# Patient Record
Sex: FEMALE | Race: WHITE | URBAN - METROPOLITAN AREA
[De-identification: names, ages, dates, MRNs, and addresses within clinical notes are randomized per-mention and may not be internally consistent; named-entity substitution may affect disease eponyms.]

---

## 2023-06-07 ENCOUNTER — HOSPITAL ENCOUNTER (OUTPATIENT)
Age: 58
Discharge: HOME | End: 2023-06-07
Payer: MEDICAID

## 2023-06-07 ENCOUNTER — APPOINTMENT (OUTPATIENT)
Dept: GENERAL RADIOLOGY | Age: 58
End: 2023-06-07
Payer: MEDICAID

## 2023-06-07 VITALS
SYSTOLIC BLOOD PRESSURE: 121 MMHG | RESPIRATION RATE: 14 BRPM | DIASTOLIC BLOOD PRESSURE: 66 MMHG | HEART RATE: 78 BPM | OXYGEN SATURATION: 96 % | TEMPERATURE: 98 F

## 2023-06-07 DIAGNOSIS — Z76.0 ENCOUNTER FOR MEDICATION REFILL: Primary | ICD-10-CM

## 2023-06-07 DIAGNOSIS — R04.2 HEMOPTYSIS: ICD-10-CM

## 2023-06-07 DIAGNOSIS — I10 HYPERTENSION, UNSPECIFIED TYPE: ICD-10-CM

## 2023-06-07 LAB
#MXD IC: 0.7 X10ˆ3/UL (ref 0.1–1)
BUN BLD-MCNC: 12 MG/DL (ref 7–18)
CHLORIDE BLD-SCNC: 98 MMOL/L (ref 98–112)
CO2 BLD-SCNC: 30 MMOL/L (ref 21–32)
CREAT BLD-MCNC: 0.6 MG/DL
DDIMER WHOLE BLOOD: <200 NG/ML DDU (ref ?–400)
GFR SERPLBLD BASED ON 1.73 SQ M-ARVRAT: 104 ML/MIN/1.73M2 (ref 60–?)
GLUCOSE BLD-MCNC: 96 MG/DL (ref 70–99)
HCT VFR BLD AUTO: 41.9 %
HCT VFR BLD CALC: 45 %
HGB BLD-MCNC: 13.6 G/DL
ISTAT IONIZED CALCIUM FOR CHEM 8: 1.18 MMOL/L (ref 1.12–1.32)
LYMPHOCYTES # BLD AUTO: 2.6 X10ˆ3/UL (ref 1–4)
LYMPHOCYTES NFR BLD AUTO: 45.4 %
MCH RBC QN AUTO: 28.5 PG (ref 26–34)
MCHC RBC AUTO-ENTMCNC: 32.5 G/DL (ref 31–37)
MCV RBC AUTO: 87.8 FL (ref 80–100)
MIXED CELL %: 12.4 %
NEUTROPHILS # BLD AUTO: 2.4 X10ˆ3/UL (ref 1.5–7.7)
NEUTROPHILS NFR BLD AUTO: 42.2 %
PLATELET # BLD AUTO: 209 X10ˆ3/UL (ref 150–450)
POTASSIUM BLD-SCNC: 4.3 MMOL/L (ref 3.6–5.1)
RBC # BLD AUTO: 4.77 X10ˆ6/UL
SODIUM BLD-SCNC: 139 MMOL/L (ref 136–145)
WBC # BLD AUTO: 5.7 X10ˆ3/UL (ref 4–11)

## 2023-06-07 PROCEDURE — 99203 OFFICE O/P NEW LOW 30 MIN: CPT

## 2023-06-07 PROCEDURE — 85378 FIBRIN DEGRADE SEMIQUANT: CPT

## 2023-06-07 PROCEDURE — 71046 X-RAY EXAM CHEST 2 VIEWS: CPT

## 2023-06-07 PROCEDURE — 99204 OFFICE O/P NEW MOD 45 MIN: CPT

## 2023-06-07 PROCEDURE — 85025 COMPLETE CBC W/AUTO DIFF WBC: CPT

## 2023-06-07 PROCEDURE — 36415 COLL VENOUS BLD VENIPUNCTURE: CPT

## 2023-06-07 PROCEDURE — 80047 BASIC METABLC PNL IONIZED CA: CPT

## 2023-06-07 RX ORDER — VALSARTAN 80 MG/1
80 TABLET ORAL DAILY
Qty: 30 TABLET | Refills: 0 | Status: SHIPPED | OUTPATIENT
Start: 2023-06-07 | End: 2023-06-29

## 2023-06-07 RX ORDER — NEBIVOLOL 2.5 MG/1
2.5 TABLET ORAL DAILY
Qty: 30 TABLET | Refills: 0 | Status: SHIPPED | OUTPATIENT
Start: 2023-06-07

## 2023-06-07 NOTE — ED INITIAL ASSESSMENT (HPI)
Patient presents with need for BP medications  Patient has 2 days left of her medication  She is from [facility] and has PCP appt on 7/7/23

## 2023-06-28 ENCOUNTER — LAB ENCOUNTER (OUTPATIENT)
Dept: LAB | Age: 58
End: 2023-06-28
Attending: INTERNAL MEDICINE
Payer: MEDICAID

## 2023-06-28 ENCOUNTER — OFFICE VISIT (OUTPATIENT)
Dept: INTERNAL MEDICINE CLINIC | Facility: CLINIC | Age: 58
End: 2023-06-28

## 2023-06-28 ENCOUNTER — EKG ENCOUNTER (OUTPATIENT)
Dept: LAB | Age: 58
End: 2023-06-28
Attending: INTERNAL MEDICINE
Payer: MEDICAID

## 2023-06-28 VITALS
HEIGHT: 62.99 IN | BODY MASS INDEX: 51.58 KG/M2 | DIASTOLIC BLOOD PRESSURE: 76 MMHG | RESPIRATION RATE: 16 BRPM | SYSTOLIC BLOOD PRESSURE: 109 MMHG | WEIGHT: 291.13 LBS | HEART RATE: 66 BPM

## 2023-06-28 DIAGNOSIS — R05.8 OTHER COUGH: ICD-10-CM

## 2023-06-28 DIAGNOSIS — Z00.00 PHYSICAL EXAM, ANNUAL: Primary | ICD-10-CM

## 2023-06-28 DIAGNOSIS — N91.2 AMENORRHEA: ICD-10-CM

## 2023-06-28 DIAGNOSIS — Z12.31 BREAST CANCER SCREENING BY MAMMOGRAM: ICD-10-CM

## 2023-06-28 DIAGNOSIS — D86.9 SARCOID: ICD-10-CM

## 2023-06-28 DIAGNOSIS — Z11.1 SCREENING-PULMONARY TB: ICD-10-CM

## 2023-06-28 DIAGNOSIS — I10 PRIMARY HYPERTENSION: ICD-10-CM

## 2023-06-28 DIAGNOSIS — Z00.00 PHYSICAL EXAM, ANNUAL: ICD-10-CM

## 2023-06-28 LAB
ALBUMIN SERPL-MCNC: 3.8 G/DL (ref 3.4–5)
ALBUMIN/GLOB SERPL: 0.9 {RATIO} (ref 1–2)
ALP LIVER SERPL-CCNC: 84 U/L
ALT SERPL-CCNC: 89 U/L
ANION GAP SERPL CALC-SCNC: 4 MMOL/L (ref 0–18)
AST SERPL-CCNC: 50 U/L (ref 15–37)
BASOPHILS # BLD AUTO: 0.06 X10(3) UL (ref 0–0.2)
BASOPHILS NFR BLD AUTO: 0.9 %
BILIRUB SERPL-MCNC: 0.6 MG/DL (ref 0.1–2)
BILIRUB UR QL: NEGATIVE
BUN BLD-MCNC: 8 MG/DL (ref 7–18)
BUN/CREAT SERPL: 13.8 (ref 10–20)
CALCIUM BLD-MCNC: 9 MG/DL (ref 8.5–10.1)
CHLORIDE SERPL-SCNC: 107 MMOL/L (ref 98–112)
CHOLEST SERPL-MCNC: 208 MG/DL (ref ?–200)
CLARITY UR: CLEAR
CO2 SERPL-SCNC: 30 MMOL/L (ref 21–32)
COLOR UR: YELLOW
CREAT BLD-MCNC: 0.58 MG/DL
DEPRECATED RDW RBC AUTO: 41.6 FL (ref 35.1–46.3)
EOSINOPHIL # BLD AUTO: 0.25 X10(3) UL (ref 0–0.7)
EOSINOPHIL NFR BLD AUTO: 3.9 %
ERYTHROCYTE [DISTWIDTH] IN BLOOD BY AUTOMATED COUNT: 12.9 % (ref 11–15)
ESTRADIOL SERPL-MCNC: 15.3 PG/ML
FASTING PATIENT LIPID ANSWER: YES
FASTING STATUS PATIENT QL REPORTED: YES
FSH SERPL-ACNC: 38.8 MIU/ML
GFR SERPLBLD BASED ON 1.73 SQ M-ARVRAT: 105 ML/MIN/1.73M2 (ref 60–?)
GLOBULIN PLAS-MCNC: 4.3 G/DL (ref 2.8–4.4)
GLUCOSE BLD-MCNC: 95 MG/DL (ref 70–99)
GLUCOSE UR-MCNC: NORMAL MG/DL
HCT VFR BLD AUTO: 41.4 %
HDLC SERPL-MCNC: 37 MG/DL (ref 40–59)
HGB BLD-MCNC: 13.4 G/DL
HGB UR QL STRIP.AUTO: NEGATIVE
IMM GRANULOCYTES # BLD AUTO: 0.01 X10(3) UL (ref 0–1)
IMM GRANULOCYTES NFR BLD: 0.2 %
KETONES UR-MCNC: NEGATIVE MG/DL
LDLC SERPL CALC-MCNC: 136 MG/DL (ref ?–100)
LEUKOCYTE ESTERASE UR QL STRIP.AUTO: 250
LYMPHOCYTES # BLD AUTO: 2.59 X10(3) UL (ref 1–4)
LYMPHOCYTES NFR BLD AUTO: 40.9 %
MCH RBC QN AUTO: 28.6 PG (ref 26–34)
MCHC RBC AUTO-ENTMCNC: 32.4 G/DL (ref 31–37)
MCV RBC AUTO: 88.3 FL
MONOCYTES # BLD AUTO: 0.58 X10(3) UL (ref 0.1–1)
MONOCYTES NFR BLD AUTO: 9.2 %
NEUTROPHILS # BLD AUTO: 2.84 X10 (3) UL (ref 1.5–7.7)
NEUTROPHILS # BLD AUTO: 2.84 X10(3) UL (ref 1.5–7.7)
NEUTROPHILS NFR BLD AUTO: 44.9 %
NITRITE UR QL STRIP.AUTO: NEGATIVE
NONHDLC SERPL-MCNC: 171 MG/DL (ref ?–130)
OSMOLALITY SERPL CALC.SUM OF ELEC: 290 MOSM/KG (ref 275–295)
PH UR: 5 [PH] (ref 5–8)
PLATELET # BLD AUTO: 198 10(3)UL (ref 150–450)
POTASSIUM SERPL-SCNC: 4.3 MMOL/L (ref 3.5–5.1)
PROT SERPL-MCNC: 8.1 G/DL (ref 6.4–8.2)
PROT UR-MCNC: NEGATIVE MG/DL
RBC # BLD AUTO: 4.69 X10(6)UL
SODIUM SERPL-SCNC: 141 MMOL/L (ref 136–145)
SP GR UR STRIP: 1.02 (ref 1–1.03)
TRIGL SERPL-MCNC: 194 MG/DL (ref 30–149)
TSI SER-ACNC: 1.79 MIU/ML (ref 0.36–3.74)
UROBILINOGEN UR STRIP-ACNC: NORMAL
VLDLC SERPL CALC-MCNC: 36 MG/DL (ref 0–30)
WBC # BLD AUTO: 6.3 X10(3) UL (ref 4–11)

## 2023-06-28 PROCEDURE — 82670 ASSAY OF TOTAL ESTRADIOL: CPT

## 2023-06-28 PROCEDURE — 83001 ASSAY OF GONADOTROPIN (FSH): CPT

## 2023-06-28 PROCEDURE — 81001 URINALYSIS AUTO W/SCOPE: CPT

## 2023-06-28 PROCEDURE — 3078F DIAST BP <80 MM HG: CPT | Performed by: INTERNAL MEDICINE

## 2023-06-28 PROCEDURE — 86480 TB TEST CELL IMMUN MEASURE: CPT

## 2023-06-28 PROCEDURE — 36415 COLL VENOUS BLD VENIPUNCTURE: CPT

## 2023-06-28 PROCEDURE — 85025 COMPLETE CBC W/AUTO DIFF WBC: CPT

## 2023-06-28 PROCEDURE — 99386 PREV VISIT NEW AGE 40-64: CPT | Performed by: INTERNAL MEDICINE

## 2023-06-28 PROCEDURE — 3074F SYST BP LT 130 MM HG: CPT | Performed by: INTERNAL MEDICINE

## 2023-06-28 PROCEDURE — 3008F BODY MASS INDEX DOCD: CPT | Performed by: INTERNAL MEDICINE

## 2023-06-28 PROCEDURE — 93005 ELECTROCARDIOGRAM TRACING: CPT

## 2023-06-28 PROCEDURE — 84443 ASSAY THYROID STIM HORMONE: CPT

## 2023-06-28 PROCEDURE — 80053 COMPREHEN METABOLIC PANEL: CPT

## 2023-06-28 PROCEDURE — 93010 ELECTROCARDIOGRAM REPORT: CPT | Performed by: STUDENT IN AN ORGANIZED HEALTH CARE EDUCATION/TRAINING PROGRAM

## 2023-06-28 PROCEDURE — 80061 LIPID PANEL: CPT

## 2023-06-29 LAB
ATRIAL RATE: 61 BPM
P AXIS: 6 DEGREES
P-R INTERVAL: 176 MS
Q-T INTERVAL: 400 MS
QRS DURATION: 96 MS
QTC CALCULATION (BEZET): 402 MS
R AXIS: -19 DEGREES
T AXIS: 20 DEGREES
VENTRICULAR RATE: 61 BPM

## 2023-06-29 RX ORDER — OMEPRAZOLE 40 MG/1
40 CAPSULE, DELAYED RELEASE ORAL DAILY
Qty: 90 CAPSULE | Refills: 1 | Status: SHIPPED | OUTPATIENT
Start: 2023-06-29

## 2023-06-29 RX ORDER — VALSARTAN 80 MG/1
80 TABLET ORAL DAILY
Qty: 90 TABLET | Refills: 3 | Status: SHIPPED | OUTPATIENT
Start: 2023-06-29

## 2023-06-29 RX ORDER — METOPROLOL SUCCINATE 25 MG/1
25 TABLET, EXTENDED RELEASE ORAL DAILY
Qty: 90 TABLET | Refills: 1 | Status: SHIPPED | OUTPATIENT
Start: 2023-06-29

## 2023-06-29 RX ORDER — METOPROLOL SUCCINATE 25 MG/1
25 TABLET, EXTENDED RELEASE ORAL DAILY
Qty: 90 TABLET | Refills: 1 | Status: SHIPPED | OUTPATIENT
Start: 2023-06-29 | End: 2023-06-29

## 2023-06-30 LAB
M TB IFN-G CD4+ T-CELLS BLD-ACNC: 0.03 IU/ML
M TB TUBERC IFN-G BLD QL: NEGATIVE
M TB TUBERC IGNF/MITOGEN IGNF CONTROL: >10 IU/ML
QFT TB1 AG MINUS NIL: 0.01 IU/ML
QFT TB2 AG MINUS NIL: 0 IU/ML

## 2023-07-02 PROBLEM — E66.01 CLASS 3 SEVERE OBESITY DUE TO EXCESS CALORIES WITH BODY MASS INDEX (BMI) OF 50.0 TO 59.9 IN ADULT (HCC): Status: ACTIVE | Noted: 2023-07-02

## 2023-07-02 PROBLEM — I10 PRIMARY HYPERTENSION: Status: ACTIVE | Noted: 2023-07-02

## 2023-07-02 PROBLEM — D86.9 SARCOID: Status: ACTIVE | Noted: 2023-07-02

## 2023-07-02 PROBLEM — E66.813 CLASS 3 SEVERE OBESITY DUE TO EXCESS CALORIES WITH BODY MASS INDEX (BMI) OF 50.0 TO 59.9 IN ADULT (HCC): Status: ACTIVE | Noted: 2023-07-02

## 2023-07-02 PROBLEM — E66.813 CLASS 3 SEVERE OBESITY DUE TO EXCESS CALORIES WITH BODY MASS INDEX (BMI) OF 50.0 TO 59.9 IN ADULT: Status: ACTIVE | Noted: 2023-07-02

## 2023-07-12 ENCOUNTER — TELEPHONE (OUTPATIENT)
Dept: ADMINISTRATIVE | Age: 58
End: 2023-07-12

## 2023-07-12 NOTE — TELEPHONE ENCOUNTER
Spoke to patient, verified name and . Was unable to speak to patient, due to us not speaking each other's languages.  are you able to give patient a call and follow up with her about her order. . please and thank you.

## 2023-07-12 NOTE — TELEPHONE ENCOUNTER
Hello,    Prior authorization for CT CHEST+ABDOMEN+PELVIS(ALL CNTRST ONLY)(CPT=71260/06766) has been partially authorized by payer/Fiiiling after first level clinical review. CPT=71260 - APPROVED  Auth # / Ref #  [id number] / [id number]  Date(s) of Service  07/11/2023 - 08/25/2023  Description  Your case has been approved by the benefit manager. GKC=26633 - DENIED  Auth # / Ref #  - / [id number]  Description  Your request has been denied by the benefit manager. Benefit Manager Notes  Not Medically Necessary    Denial rationale: \"The study must be needed for one of the following.     -To confirm if you have sarcoid (a rare disease caused by inflammation) or not. -Worsening symptoms.   -New symptoms.   -Your doctor is thinking about changing your treatment. \"    Case remains [id number] eligible until end of day 07/19/2023. Case remains first level appeal eligible for the next 60 calendar days, appeal information may be found within denial letter. Jyiz-[id number] offer letter and denial letter PDF's may be found within the Media tab. *PLEASE NOTE: patient is scheduled for both imaging studies 07/14/2023. Please advise,  Thank you!

## 2023-07-12 NOTE — TELEPHONE ENCOUNTER
Spoke to every core physician support [facility] 1 [phone] Case #[id number].   Spoke to Emma Penny V who approved CT scan of the pelvis and abdomen valid from 7/12/2023 until August 26, 2023 approval number [id number]

## 2023-07-14 ENCOUNTER — TELEPHONE (OUTPATIENT)
Dept: INTERNAL MEDICINE CLINIC | Facility: CLINIC | Age: 58
End: 2023-07-14

## 2023-07-14 ENCOUNTER — HOSPITAL ENCOUNTER (OUTPATIENT)
Dept: CT IMAGING | Facility: HOSPITAL | Age: 58
Discharge: HOME | End: 2023-07-14
Attending: INTERNAL MEDICINE
Payer: MEDICAID

## 2023-07-14 DIAGNOSIS — D86.9 SARCOID: ICD-10-CM

## 2023-07-14 PROCEDURE — 74177 CT ABD & PELVIS W/CONTRAST: CPT | Performed by: INTERNAL MEDICINE

## 2023-07-14 PROCEDURE — 71260 CT THORAX DX C+: CPT | Performed by: INTERNAL MEDICINE

## 2023-07-14 NOTE — TELEPHONE ENCOUNTER
Patient is requesting a urinalysis to be ordered by Dr Josi Jerry.  Patient thought Dr Josi Jerry had already placed order since they discussed this earlier in the week    Patient is asking if Dr Josi Jerry can send another referral for pulmonologist - the current referral does not have an appointment until October and patient thinks she should be seen sooner

## 2023-07-17 ENCOUNTER — TELEPHONE (OUTPATIENT)
Dept: INTERNAL MEDICINE CLINIC | Facility: CLINIC | Age: 58
End: 2023-07-17

## 2023-07-17 DIAGNOSIS — R74.8 ELEVATED LIVER ENZYMES: Primary | ICD-10-CM

## 2023-07-17 NOTE — TELEPHONE ENCOUNTER
Spoke to patient, reviewed CT scan findings, no evidence of sarcoidosis, advised that I did place order to see another pulmonologist but I do not see urgency. She will need follow-up CT scan of the lungs in 6 months. Mildly nonspecific enlarged lymph nodes in the abdomen, with signs of hepatic steatosis, advised patient to repeat liver enzymes and we will check additional blood work rule out hepatitis B and C and other diseases. Order for urine analysis placed.   Patient will follow-up in August

## 2023-07-21 ENCOUNTER — HOSPITAL ENCOUNTER (OUTPATIENT)
Age: 58
Discharge: HOME | End: 2023-07-21
Payer: MEDICAID

## 2023-07-21 ENCOUNTER — APPOINTMENT (OUTPATIENT)
Dept: GENERAL RADIOLOGY | Age: 58
End: 2023-07-21
Payer: MEDICAID

## 2023-07-21 VITALS
HEART RATE: 82 BPM | TEMPERATURE: 99 F | SYSTOLIC BLOOD PRESSURE: 117 MMHG | DIASTOLIC BLOOD PRESSURE: 76 MMHG | OXYGEN SATURATION: 96 % | RESPIRATION RATE: 18 BRPM

## 2023-07-21 DIAGNOSIS — M72.2 PLANTAR FASCIITIS: ICD-10-CM

## 2023-07-21 DIAGNOSIS — M77.32 CALCANEAL SPUR OF FOOT, LEFT: Primary | ICD-10-CM

## 2023-07-21 PROCEDURE — 99213 OFFICE O/P EST LOW 20 MIN: CPT

## 2023-07-21 PROCEDURE — 73650 X-RAY EXAM OF HEEL: CPT

## 2023-07-21 PROCEDURE — 99214 OFFICE O/P EST MOD 30 MIN: CPT

## 2023-07-21 NOTE — ED INITIAL ASSESSMENT (HPI)
Pt presents with left heel pain x 3 months. Pt reports stepping on a piece of glass. No FB is externally visible.

## 2023-07-21 NOTE — DISCHARGE INSTRUCTIONS
There is no fracture on your x-ray. There is a heel spur on your x-ray. You also likely have some irritation to the plantar fascia on the heel. Rest, ice and elevate. Take Tylenol and Motrin for pain as needed. If you develop any numbness, tingling, acutely worsening pain, swelling or any other concerning complaints you should go to the emergency department. If you have persistent pain for more than the next week make an appointment to see the podiatry specialist.  Otherwise follow-up with your primary care doctor.

## 2023-07-26 ENCOUNTER — HOSPITAL ENCOUNTER (OUTPATIENT)
Dept: MAMMOGRAPHY | Age: 58
End: 2023-07-26
Attending: INTERNAL MEDICINE
Payer: MEDICAID

## 2023-07-26 ENCOUNTER — HOSPITAL ENCOUNTER (OUTPATIENT)
Dept: MAMMOGRAPHY | Age: 58
Discharge: HOME | End: 2023-07-26
Attending: INTERNAL MEDICINE
Payer: MEDICAID

## 2023-07-26 ENCOUNTER — APPOINTMENT (OUTPATIENT)
Dept: MAMMOGRAPHY | Age: 58
End: 2023-07-26
Attending: INTERNAL MEDICINE
Payer: MEDICAID

## 2023-07-26 ENCOUNTER — LAB ENCOUNTER (OUTPATIENT)
Dept: LAB | Age: 58
End: 2023-07-26
Attending: INTERNAL MEDICINE
Payer: MEDICAID

## 2023-07-26 DIAGNOSIS — R35.0 FREQUENT URINATION: ICD-10-CM

## 2023-07-26 DIAGNOSIS — R74.8 ELEVATED LIVER ENZYMES: ICD-10-CM

## 2023-07-26 DIAGNOSIS — Z12.31 BREAST CANCER SCREENING BY MAMMOGRAM: ICD-10-CM

## 2023-07-26 DIAGNOSIS — Z86.2 HISTORY OF SARCOIDOSIS: ICD-10-CM

## 2023-07-26 LAB
A1AT SERPL-MCNC: 130 MG/DL (ref 90–200)
A1AT SERPL-MCNC: 132 MG/DL (ref 90–200)
ALBUMIN SERPL-MCNC: 3.7 G/DL (ref 3.4–5)
ALP LIVER SERPL-CCNC: 86 U/L
ALT SERPL-CCNC: 98 U/L
AST SERPL-CCNC: 61 U/L (ref 15–37)
BILIRUB DIRECT SERPL-MCNC: 0.2 MG/DL (ref 0–0.2)
BILIRUB SERPL-MCNC: 0.6 MG/DL (ref 0.1–2)
BILIRUB UR QL STRIP.AUTO: NEGATIVE
CERULOPLASMIN SERPL-MCNC: 26.9 MG/DL (ref 20–60)
COLOR UR AUTO: YELLOW
DEPRECATED HBV CORE AB SER IA-ACNC: 77.8 NG/ML
GLUCOSE UR STRIP.AUTO-MCNC: NEGATIVE MG/DL
HAV AB SER QL IA: REACTIVE
HBV CORE AB SERPL QL IA: NONREACTIVE
HBV SURFACE AB SER QL: NONREACTIVE
HBV SURFACE AB SERPL IA-ACNC: <3.1 MIU/ML
HBV SURFACE AG SERPL QL IA: NONREACTIVE
HCV AB SERPL QL IA: NONREACTIVE
KETONES UR STRIP.AUTO-MCNC: NEGATIVE MG/DL
NITRITE UR QL STRIP.AUTO: NEGATIVE
PH UR STRIP.AUTO: 5 [PH] (ref 5–8)
PROT SERPL-MCNC: 7.8 G/DL (ref 6.4–8.2)
PROT UR STRIP.AUTO-MCNC: NEGATIVE MG/DL
RBC UR QL AUTO: NEGATIVE
SP GR UR STRIP.AUTO: 1.03 (ref 1–1.03)
TRANSFERRIN SERPL-MCNC: 253 MG/DL (ref 200–360)
UROBILINOGEN UR STRIP.AUTO-MCNC: <2 MG/DL

## 2023-07-26 PROCEDURE — 82390 ASSAY OF CERULOPLASMIN: CPT

## 2023-07-26 PROCEDURE — 77063 BREAST TOMOSYNTHESIS BI: CPT | Performed by: INTERNAL MEDICINE

## 2023-07-26 PROCEDURE — 86334 IMMUNOFIX E-PHORESIS SERUM: CPT

## 2023-07-26 PROCEDURE — 87340 HEPATITIS B SURFACE AG IA: CPT

## 2023-07-26 PROCEDURE — 87086 URINE CULTURE/COLONY COUNT: CPT

## 2023-07-26 PROCEDURE — 84165 PROTEIN E-PHORESIS SERUM: CPT

## 2023-07-26 PROCEDURE — 77067 SCR MAMMO BI INCL CAD: CPT | Performed by: INTERNAL MEDICINE

## 2023-07-26 PROCEDURE — 86803 HEPATITIS C AB TEST: CPT

## 2023-07-26 PROCEDURE — 83516 IMMUNOASSAY NONANTIBODY: CPT

## 2023-07-26 PROCEDURE — 86709 HEPATITIS A IGM ANTIBODY: CPT

## 2023-07-26 PROCEDURE — 81001 URINALYSIS AUTO W/SCOPE: CPT

## 2023-07-26 PROCEDURE — 86708 HEPATITIS A ANTIBODY: CPT

## 2023-07-26 PROCEDURE — 86706 HEP B SURFACE ANTIBODY: CPT

## 2023-07-26 PROCEDURE — 36415 COLL VENOUS BLD VENIPUNCTURE: CPT

## 2023-07-26 PROCEDURE — 86704 HEP B CORE ANTIBODY TOTAL: CPT

## 2023-07-26 PROCEDURE — 84466 ASSAY OF TRANSFERRIN: CPT

## 2023-07-26 PROCEDURE — 83521 IG LIGHT CHAINS FREE EACH: CPT

## 2023-07-26 PROCEDURE — 80076 HEPATIC FUNCTION PANEL: CPT

## 2023-07-26 PROCEDURE — 80503 PATH CLIN CONSLTJ SF 5-20: CPT

## 2023-07-26 PROCEDURE — 82728 ASSAY OF FERRITIN: CPT

## 2023-07-26 PROCEDURE — 82103 ALPHA-1-ANTITRYPSIN TOTAL: CPT

## 2023-07-27 ENCOUNTER — OFFICE VISIT (OUTPATIENT)
Dept: PODIATRY CLINIC | Facility: CLINIC | Age: 58
End: 2023-07-27

## 2023-07-27 DIAGNOSIS — M77.32 CALCANEAL SPUR OF LEFT FOOT: ICD-10-CM

## 2023-07-27 DIAGNOSIS — M79.672 INFLAMMATORY HEEL PAIN, LEFT: ICD-10-CM

## 2023-07-27 DIAGNOSIS — Q66.212 METATARSUS PRIMUS VARUS OF LEFT FOOT: ICD-10-CM

## 2023-07-27 DIAGNOSIS — M20.12 HALLUX VALGUS OF LEFT FOOT: ICD-10-CM

## 2023-07-27 DIAGNOSIS — M72.2 PLANTAR FASCIITIS OF LEFT FOOT: Primary | ICD-10-CM

## 2023-07-27 LAB
ACTIN SMOOTH MUSCLE AB: 8 UNITS
HAV IGM SER QL: NONREACTIVE

## 2023-07-27 PROCEDURE — 99203 OFFICE O/P NEW LOW 30 MIN: CPT | Performed by: PODIATRIST

## 2023-07-27 PROCEDURE — 20550 NJX 1 TENDON SHEATH/LIGAMENT: CPT | Performed by: PODIATRIST

## 2023-07-27 RX ORDER — TRIAMCINOLONE ACETONIDE 40 MG/ML
40 INJECTION, SUSPENSION INTRA-ARTICULAR; INTRAMUSCULAR ONCE
Status: COMPLETED | OUTPATIENT
Start: 2023-07-27 | End: 2023-07-27

## 2023-07-27 RX ADMIN — TRIAMCINOLONE ACETONIDE 40 MG: 40 INJECTION, SUSPENSION INTRA-ARTICULAR; INTRAMUSCULAR at 14:27:00

## 2023-07-27 NOTE — PROGRESS NOTES
Per Dr. Hakan Urban to draw up 1ml of Kenalog 40 and 1ml of 0.5% Marcaine for injection to left heel. no

## 2023-07-27 NOTE — PROGRESS NOTES
David Bautista is a 62year old female. Patient presents with:  Consult: Left foot heel pain since April pain progressed and now it is hard to ambulate. Pain 10/10        HPI:   This pleasant 19-year-old female patient who speaks only [place] presents to the clinic today the  line was used in order to communicate with her. She is complaining of left heel pain that she has had ever since she came to the [place] pain is very bad getting worst always in the morning or after getting up from a prolonged nonweightbearing position but also it hurts periodically all throughout the day she says it is a deep inside pain. At today's visit reviewed nurse's history as taken above, allergies medications and medical history as documented below. All changes duly noted  Allergies: Patient has no known allergies. Current Outpatient Medications   Medication Sig Dispense Refill    metoprolol succinate ER 25 MG Oral Tablet 24 Hr Take 1 tablet (25 mg total) by mouth daily. 90 tablet 1    valsartan 80 MG Oral Tab Take 1 tablet (80 mg total) by mouth daily. In the evening 90 tablet 3    Omeprazole 40 MG Oral Capsule Delayed Release Take 1 capsule (40 mg total) by mouth daily. 90 capsule 1    nebivolol 2.5 MG Oral Tab Take 1 tablet (2.5 mg total) by mouth daily. 30 tablet 0      Past Medical History:   Diagnosis Date    Hypertension       History reviewed. No pertinent surgical history. History reviewed. No pertinent family history.    Social History    Socioeconomic History      Marital status:     Tobacco Use      Smoking status: Never      Smokeless tobacco: Never    Substance and Sexual Activity      Alcohol use: Yes      Drug use: Never          REVIEW OF SYSTEMS:   Today reviewed systens as documented below  GENERAL HEALTH: feels well otherwise  SKIN: Refer to exam below  RESPIRATORY: denies shortness of breath with exertion  CARDIOVASCULAR: denies chest pain on exertion  GI: denies abdominal pain and denies heartburn  NEURO: denies headaches    EXAM:   There were no vitals taken for this visit. GENERAL: well developed, well nourished, in no apparent distress  EXTREMITIES:   1. Integument: The skin on her feet is warm and dry. He has an enlarged medial eminence of the first metatarsal head bilateral she says this is also painful from time to time. 2. Vascular: Patient has palpable pulses on the left both dorsalis pedis and posterior tibial that are within normal limits there is good cap return the pedal digits   3. Neurologic: Patient has intact sensorium there are no deficits noted   4. Musculoskeletal: The patient has severe pain on palpation of the plantar fascia of her left heel. Hallux is laterally deviated in a track bound position on the left foot  X-rays were taken AP and lateral showing an infracalcaneal spur on the left heel and also a significant increase in the first intermetatarsal angle with lateral deviation of the hallux and sesamoid complex    ASSESSMENT AND PLAN:   Diagnoses and all orders for this visit:    Plantar fasciitis of left foot  -     triamcinolone acetonide (Kenalog-40) 40 MG/ML injection 40 mg  -     OP REFERRAL TO [facility]    Inflammatory heel pain, left  -     triamcinolone acetonide (Kenalog-40) 40 MG/ML injection 40 mg  -     OP REFERRAL TO [facility]    Calcaneal spur of left foot  -     triamcinolone acetonide (Kenalog-40) 40 MG/ML injection 40 mg  -     OP REFERRAL TO [facility]    Hallux valgus of left foot    Metatarsus primus varus of left foot    Other orders  -     [facility] AMB POD XR - LT FOOT 2 VIEWS(AP, LATERAL)WT BEARING        Plan: Today the biggest problem is her heel I educated her about the bunion and about the heel and at today's visit recommended a cortisone injection to start. Today discussed the nature and extent of a cortisone injection as well as the possible complications and risks.   Patient was in agreement to receive the injection. The patient was consented for a cortisone injection and after timeout was taken the injection consisting of 40 mg Kenalog and 1.0 cc of 0.5% Marcaine plain was injected into the symptomatic plantar fascia of the left heel using aseptic technique and appropriate approach. A Band-Aid was applied to the injection site. Has been here for some time with prescribed physical therapy for 4 weeks 8 visits total.  See the patient again in 5 weeks and at that time we can discuss bunion correction which would be a Lapidus procedure. The patient indicates understanding of these issues and agrees to the plan.     Simba Garcia DPM

## 2023-07-28 LAB
ALBUMIN SERPL ELPH-MCNC: 4.35 G/DL (ref 3.75–5.21)
ALBUMIN/GLOB SERPL: 1.34 {RATIO} (ref 1–2)
ALPHA1 GLOB SERPL ELPH-MCNC: 0.27 G/DL (ref 0.19–0.46)
ALPHA2 GLOB SERPL ELPH-MCNC: 0.81 G/DL (ref 0.48–1.05)
B-GLOBULIN SERPL ELPH-MCNC: 0.78 G/DL (ref 0.68–1.23)
GAMMA GLOB SERPL ELPH-MCNC: 1.4 G/DL (ref 0.62–1.7)
KAPPA LC FREE SER-MCNC: 2.79 MG/DL (ref 0.33–1.94)
KAPPA LC FREE/LAMBDA FREE SER NEPH: 1.37 {RATIO} (ref 0.26–1.65)
LAMBDA LC FREE SERPL-MCNC: 2.03 MG/DL (ref 0.57–2.63)
PROT SERPL-MCNC: 7.6 G/DL (ref 6.4–8.2)

## 2023-08-08 ENCOUNTER — OFFICE VISIT (OUTPATIENT)
Facility: LOCATION | Age: 58
End: 2023-08-08
Attending: PODIATRIST
Payer: MEDICAID

## 2023-08-08 DIAGNOSIS — M72.2 PLANTAR FASCIITIS OF LEFT FOOT: Primary | ICD-10-CM

## 2023-08-08 DIAGNOSIS — M79.672 INFLAMMATORY HEEL PAIN, LEFT: ICD-10-CM

## 2023-08-08 DIAGNOSIS — M77.32 CALCANEAL SPUR OF LEFT FOOT: ICD-10-CM

## 2023-08-08 PROCEDURE — 97161 PT EVAL LOW COMPLEX 20 MIN: CPT

## 2023-08-08 PROCEDURE — 97110 THERAPEUTIC EXERCISES: CPT

## 2023-08-11 ENCOUNTER — HOSPITAL ENCOUNTER (OUTPATIENT)
Dept: GENERAL RADIOLOGY | Age: 58
Discharge: HOME | End: 2023-08-11
Attending: INTERNAL MEDICINE
Payer: MEDICAID

## 2023-08-11 ENCOUNTER — OFFICE VISIT (OUTPATIENT)
Facility: LOCATION | Age: 58
End: 2023-08-11
Attending: PODIATRIST
Payer: MEDICAID

## 2023-08-11 ENCOUNTER — OFFICE VISIT (OUTPATIENT)
Dept: INTERNAL MEDICINE CLINIC | Facility: CLINIC | Age: 58
End: 2023-08-11

## 2023-08-11 VITALS
BODY MASS INDEX: 53.92 KG/M2 | DIASTOLIC BLOOD PRESSURE: 88 MMHG | SYSTOLIC BLOOD PRESSURE: 129 MMHG | WEIGHT: 293 LBS | HEIGHT: 62 IN | HEART RATE: 78 BPM | RESPIRATION RATE: 18 BRPM

## 2023-08-11 DIAGNOSIS — R74.8 ELEVATED LIVER ENZYMES: ICD-10-CM

## 2023-08-11 DIAGNOSIS — M79.18 CERVICAL MYOFASCIAL PAIN SYNDROME: ICD-10-CM

## 2023-08-11 DIAGNOSIS — H43.393 VITREOUS FLOATERS OF BOTH EYES: ICD-10-CM

## 2023-08-11 DIAGNOSIS — M54.2 NECK PAIN: Primary | ICD-10-CM

## 2023-08-11 DIAGNOSIS — E66.01 CLASS 3 SEVERE OBESITY DUE TO EXCESS CALORIES WITHOUT SERIOUS COMORBIDITY WITH BODY MASS INDEX (BMI) OF 50.0 TO 59.9 IN ADULT (HCC): ICD-10-CM

## 2023-08-11 DIAGNOSIS — M54.2 NECK PAIN: ICD-10-CM

## 2023-08-11 DIAGNOSIS — Z12.11 COLON CANCER SCREENING: ICD-10-CM

## 2023-08-11 DIAGNOSIS — I87.2 EDEMA OF BOTH LOWER EXTREMITIES DUE TO PERIPHERAL VENOUS INSUFFICIENCY: ICD-10-CM

## 2023-08-11 DIAGNOSIS — R06.83 SNORING: ICD-10-CM

## 2023-08-11 DIAGNOSIS — M79.18 MYOFASCIAL PAIN SYNDROME OF THORACIC SPINE: ICD-10-CM

## 2023-08-11 PROCEDURE — 3074F SYST BP LT 130 MM HG: CPT | Performed by: INTERNAL MEDICINE

## 2023-08-11 PROCEDURE — 97140 MANUAL THERAPY 1/> REGIONS: CPT

## 2023-08-11 PROCEDURE — 97110 THERAPEUTIC EXERCISES: CPT

## 2023-08-11 PROCEDURE — 72052 X-RAY EXAM NECK SPINE 6/>VWS: CPT | Performed by: INTERNAL MEDICINE

## 2023-08-11 PROCEDURE — 99214 OFFICE O/P EST MOD 30 MIN: CPT | Performed by: INTERNAL MEDICINE

## 2023-08-11 PROCEDURE — 3008F BODY MASS INDEX DOCD: CPT | Performed by: INTERNAL MEDICINE

## 2023-08-11 PROCEDURE — 3079F DIAST BP 80-89 MM HG: CPT | Performed by: INTERNAL MEDICINE

## 2023-08-11 NOTE — PROGRESS NOTES
Diagnosis:   Plantar fasciitis of left foot (M72.2)  Inflammatory heel pain, left (U17.535)  Calcaneal spur of left foot (M77.32)         Referring Provider: Gabriela  Date of Evaluation:    8/8/23    Precautions:  None Next MD visit:   none scheduled  Date of Surgery: n/a   Insurance Primary/Secondary: GRICELDA Martinez / N/A     # Auth Visits: 8            Subjective: calf and heel feel much better than last visit     Pain: 3/10      Objective:   See flowsheet      Assessment:   Significant myofascial restrictions noted throughout calf musculature (L>R)  Improvement with soft tissue treatment and stretching       Goals:   Pt will demonstrate improved DF AROM by 5-8 degrees to promote proper foot clearance during gait and greater ease descending stairs without compensation  Pt will have increased ankle strength to 5/5 throughout for improved ankle control with gait and stair negotiation  Pt will report less pain and restriction noted with first steps in the morning when getting out of bed or from prolonged nonweightbearing position  Pt will demonstrate improved ambulatory pattern with good push off and less avoidance of terminal stance   Pt will report <4/10 pain with work and home activities   Pt will be independent and compliant with comprehensive HEP to maintain progress achieved in PT     Plan: improve soft tissue flexibility (calf, PF, HS)  Date: 8/11/2023  TX#: 2/8 Date:                 TX#: 3/ Date:                 TX#: 4/ Date:                 TX#: 5/ Date: Tx#: 6/   Manual Therapy       STM/IASTM (B) calf musculature        TherEx       Long sitting calf stretch (knee straight and bent) 2 min each        PT assisted HS stretch 1 min x 2 each       Balance board PF/DF x 2 min        PF ball rolls x 2 min       HEP: Seated PF self release, Seated PF stretch    Charges: Manual 2;  TherEx 1       Total Timed Treatment: 40 min  Total Treatment Time: 40 min

## 2023-08-13 PROBLEM — K76.0 HEPATIC STEATOSIS: Status: ACTIVE | Noted: 2023-08-13

## 2023-08-15 ENCOUNTER — OFFICE VISIT (OUTPATIENT)
Dept: OTOLARYNGOLOGY | Facility: CLINIC | Age: 58
End: 2023-08-15

## 2023-08-15 ENCOUNTER — TELEPHONE (OUTPATIENT)
Dept: OTOLARYNGOLOGY | Facility: CLINIC | Age: 58
End: 2023-08-15

## 2023-08-15 ENCOUNTER — OFFICE VISIT (OUTPATIENT)
Facility: LOCATION | Age: 58
End: 2023-08-15
Attending: PODIATRIST
Payer: MEDICAID

## 2023-08-15 VITALS — WEIGHT: 293 LBS | BODY MASS INDEX: 53.92 KG/M2 | HEIGHT: 62 IN | TEMPERATURE: 97 F

## 2023-08-15 DIAGNOSIS — J34.3 HYPERTROPHY OF BOTH INFERIOR NASAL TURBINATES: ICD-10-CM

## 2023-08-15 DIAGNOSIS — R06.83 SNORING: Primary | ICD-10-CM

## 2023-08-15 DIAGNOSIS — J34.2 NASAL SEPTAL DEVIATION: ICD-10-CM

## 2023-08-15 DIAGNOSIS — J35.1 TONSILLAR HYPERTROPHY: ICD-10-CM

## 2023-08-15 DIAGNOSIS — H60.8X3 CHRONIC ECZEMATOUS OTITIS EXTERNA OF BOTH EARS: ICD-10-CM

## 2023-08-15 PROCEDURE — 97140 MANUAL THERAPY 1/> REGIONS: CPT

## 2023-08-15 PROCEDURE — 97110 THERAPEUTIC EXERCISES: CPT

## 2023-08-15 PROCEDURE — 31575 DIAGNOSTIC LARYNGOSCOPY: CPT | Performed by: STUDENT IN AN ORGANIZED HEALTH CARE EDUCATION/TRAINING PROGRAM

## 2023-08-15 PROCEDURE — 3008F BODY MASS INDEX DOCD: CPT | Performed by: STUDENT IN AN ORGANIZED HEALTH CARE EDUCATION/TRAINING PROGRAM

## 2023-08-15 PROCEDURE — 99244 OFF/OP CNSLTJ NEW/EST MOD 40: CPT | Performed by: STUDENT IN AN ORGANIZED HEALTH CARE EDUCATION/TRAINING PROGRAM

## 2023-08-15 RX ORDER — MOMETASONE FUROATE 1 MG/G
1 OINTMENT TOPICAL DAILY
Qty: 1 EACH | Refills: 0 | Status: SHIPPED | OUTPATIENT
Start: 2023-08-15

## 2023-08-15 RX ORDER — AZELASTINE 1 MG/ML
2 SPRAY, METERED NASAL 2 TIMES DAILY
Qty: 30 ML | Refills: 0 | Status: SHIPPED | OUTPATIENT
Start: 2023-08-15

## 2023-08-15 RX ORDER — FLUOCINOLONE ACETONIDE 0.11 MG/ML
3 OIL AURICULAR (OTIC) 2 TIMES DAILY
Qty: 20 ML | Refills: 0 | Status: SHIPPED | OUTPATIENT
Start: 2023-08-15 | End: 2023-08-22

## 2023-08-15 NOTE — PROGRESS NOTES
Diagnosis:   Plantar fasciitis of left foot (M72.2)  Inflammatory heel pain, left (C66.006)  Calcaneal spur of left foot (M77.32)         Referring Provider: Gabriela  Date of Evaluation:    8/8/23    Precautions:  None Next MD visit:   none scheduled  Date of Surgery: n/a   Insurance Primary/Secondary: GRICELDA Winters / N/A     # Auth Visits: 8            Subjective: significant improvement since last visit     Pain: 4/10      Objective:   See flowsheet      Assessment:   Significant myofascial restrictions noted throughout calf musculature (L>R) - improved with IASTM/STM  Standing and walking better without heel pain         Goals:   Pt will demonstrate improved DF AROM by 5-8 degrees to promote proper foot clearance during gait and greater ease descending stairs without compensation  Pt will have increased ankle strength to 5/5 throughout for improved ankle control with gait and stair negotiation  Pt will report less pain and restriction noted with first steps in the morning when getting out of bed or from prolonged nonweightbearing position  Pt will demonstrate improved ambulatory pattern with good push off and less avoidance of terminal stance   Pt will report <4/10 pain with work and home activities   Pt will be independent and compliant with comprehensive HEP to maintain progress achieved in PT     Plan: improve soft tissue flexibility (calf, PF, HS)  Date: 8/11/2023  TX#: 2/8 Date: 8/15/23               TX#: 3/8 Date:                 TX#: 4/ Date:                 TX#: 5/ Date:    Tx#: 6/   Manual Therapy Manual Therapy      STM/IASTM (B) calf musculature  STM/IASTM (B) calf musculature       TherEx TherEx      Long sitting calf stretch (knee straight and bent) 2 min each  Long sitting calf stretch (knee straight and bent) 2 min each       PT assisted HS stretch 1 min x 2 each PF ball rolls x 3  min      Balance board PF/DF x 2 min  PF stretch x 10 (10 seconds)      PF ball rolls x 2 min Balance board PF/DF x 3 min       PT assisted HS stretch 1 min (B)       HEP: Seated PF self release, Seated PF stretch    Charges: Manual 2;  TherEx 1       Total Timed Treatment: 40 min  Total Treatment Time: 40 min

## 2023-08-15 NOTE — TELEPHONE ENCOUNTER
Prior auth for speech therapy scope approved.    Authorization number  #[id number]  Approval dates (8/15/23 - 10/14/23)

## 2023-08-17 ENCOUNTER — OFFICE VISIT (OUTPATIENT)
Facility: LOCATION | Age: 58
End: 2023-08-17
Attending: PODIATRIST
Payer: MEDICAID

## 2023-08-17 PROCEDURE — 97110 THERAPEUTIC EXERCISES: CPT

## 2023-08-17 PROCEDURE — 97140 MANUAL THERAPY 1/> REGIONS: CPT

## 2023-08-17 NOTE — PROGRESS NOTES
Diagnosis:   Plantar fasciitis of left foot (M72.2)  Inflammatory heel pain, left (S08.551)  Calcaneal spur of left foot (M77.32)         Referring Provider: Gabriela  Date of Evaluation:    8/8/23    Precautions:  None Next MD visit:   none scheduled  Date of Surgery: n/a   Insurance Primary/Secondary: GRICELDA Spangler Bettie / N/A     # Auth Visits: 8            Subjective: was walking for an hour and had increased pain in front of ankle; heel has been ok     Pain: 4/10      Objective:   See flowsheet      Assessment:   Significant myofascial restrictions noted throughout calf musculature (L>R) - improved with IASTM/STM  Emphasized importance of continued stretching of the HS & calf musculature as well as stretching/mobilizing the plantar fascia for continued heel relief   Emphasized wearing supportive footwear while walking   Responded very well with PT assisted ankle DF mobilization on step - improved ambulation less pain and restriction         Goals:   Pt will demonstrate improved DF AROM by 5-8 degrees to promote proper foot clearance during gait and greater ease descending stairs without compensation  Pt will have increased ankle strength to 5/5 throughout for improved ankle control with gait and stair negotiation  Pt will report less pain and restriction noted with first steps in the morning when getting out of bed or from prolonged nonweightbearing position  Pt will demonstrate improved ambulatory pattern with good push off and less avoidance of terminal stance   Pt will report <4/10 pain with work and home activities   Pt will be independent and compliant with comprehensive HEP to maintain progress achieved in PT     Plan: improve soft tissue flexibility (calf, PF, HS)  Date: 8/11/2023  TX#: 2/8 Date: 8/15/23               TX#: 3/8 Date: 8/17/23                TX#: 4/8 Date:                 TX#: 5/ Date:    Tx#: 6/   Manual Therapy Manual Therapy Manual Therapy     STM/IASTM (B) calf musculature  STM/IASTM (B) calf musculature  IASTM to (L) calf & PF       TherEx TherEx TherEx     Long sitting calf stretch (knee straight and bent) 2 min each  Long sitting calf stretch (knee straight and bent) 2 min each  Long sitting calf stretch (knee straight and bent) 2 min each      PT assisted HS stretch 1 min x 2 each PF ball rolls x 3  min PF ball rolls x 3  min     Balance board PF/DF x 2 min  PF stretch x 10 (10 seconds) Seated HS stretch 30 sec x 3 (B)     PF ball rolls x 2 min Balance board PF/DF x 3 min DF MWM on 6 inch step x 15 each       PT assisted HS stretch 1 min (B)  SB calf stretch 1 min x 2     HEP: Seated PF self release, Seated PF stretch, Seated calf stretch    Charges: Manual 2;  TherEx 1       Total Timed Treatment: 45 min  Total Treatment Time: 45 min

## 2023-08-22 ENCOUNTER — OFFICE VISIT (OUTPATIENT)
Facility: LOCATION | Age: 58
End: 2023-08-22
Attending: PODIATRIST
Payer: MEDICAID

## 2023-08-22 PROCEDURE — 97110 THERAPEUTIC EXERCISES: CPT

## 2023-08-22 PROCEDURE — 97140 MANUAL THERAPY 1/> REGIONS: CPT

## 2023-08-22 NOTE — PROGRESS NOTES
Diagnosis:   Plantar fasciitis of left foot (M72.2)  Inflammatory heel pain, left (X04.378)  Calcaneal spur of left foot (M77.32)         Referring Provider: Gabriela  Date of Evaluation:    8/8/23    Precautions:  None Next MD visit:   none scheduled  Date of Surgery: n/a   Insurance Primary/Secondary: [id number] [facility] / N/A     # Auth Visits: 8            Subjective: ankle and foot felt good over the weekend     Pain: 0/10      Objective:   See flowsheet      Assessment:   Much less palpable tenderness throughout (B) calf musculature  Continues to have significant tightness of the HS, calf musculature, PF  Responds well to treatment. Demonstrates improved ambulatory pattern          Goals:   Pt will demonstrate improved DF AROM by 5-8 degrees to promote proper foot clearance during gait and greater ease descending stairs without compensation  Pt will have increased ankle strength to 5/5 throughout for improved ankle control with gait and stair negotiation  Pt will report less pain and restriction noted with first steps in the morning when getting out of bed or from prolonged nonweightbearing position  Pt will demonstrate improved ambulatory pattern with good push off and less avoidance of terminal stance   Pt will report <4/10 pain with work and home activities   Pt will be independent and compliant with comprehensive HEP to maintain progress achieved in PT     Plan: improve soft tissue flexibility (calf, PF, HS)  Date: 8/11/2023  TX#: 2/8 Date: 8/15/23               TX#: 3/8 Date: 8/17/23                TX#: 4/8 Date: 8/22/23                 TX#: 5/8 Date:    Tx#: 6/   Manual Therapy Manual Therapy Manual Therapy Manual Therapy    STM/IASTM (B) calf musculature  STM/IASTM (B) calf musculature  IASTM to (L) calf & PF   IASTM to (L) calf & PF    TherEx TherEx TherEx TherEx    Long sitting calf stretch (knee straight and bent) 2 min each  Long sitting calf stretch (knee straight and bent) 2 min each  Long sitting calf stretch (knee straight and bent) 2 min each  Long sitting calf stretch (knee straight and bent) 2 min each     PT assisted HS stretch 1 min x 2 each PF ball rolls x 3  min PF ball rolls x 3  min PF ball rolls x 3  min    Balance board PF/DF x 2 min  PF stretch x 10 (10 seconds) Seated HS stretch 30 sec x 3 (B) Seated HS stretch 30 sec x 3 (B)    PF ball rolls x 2 min Balance board PF/DF x 3 min DF MWM on 6 inch step x 15 each  DF MWM on 6 inch step x 15 each      PT assisted HS stretch 1 min (B)  SB calf stretch 1 min x 2 SB calf stretch 1 min x 2       Balance board F-B x 2 min    HEP: Seated PF self release, Seated PF stretch, Seated calf stretch    Charges: Manual 2;  TherEx 1       Total Timed Treatment: 40 min  Total Treatment Time: 40 min

## 2023-08-24 ENCOUNTER — TELEPHONE (OUTPATIENT)
Dept: PHYSICAL THERAPY | Facility: HOSPITAL | Age: 58
End: 2023-08-24

## 2023-08-24 ENCOUNTER — OFFICE VISIT (OUTPATIENT)
Facility: LOCATION | Age: 58
End: 2023-08-24
Attending: PODIATRIST
Payer: MEDICAID

## 2023-08-24 PROCEDURE — 97140 MANUAL THERAPY 1/> REGIONS: CPT

## 2023-08-24 PROCEDURE — 97110 THERAPEUTIC EXERCISES: CPT

## 2023-08-24 NOTE — PROGRESS NOTES
Diagnosis:   Plantar fasciitis of left foot (M72.2)  Inflammatory heel pain, left (V71.231)  Calcaneal spur of left foot (M77.32)         Referring Provider: Gabriela  Date of Evaluation:    8/8/23    Precautions:  None Next MD visit:   none scheduled  Date of Surgery: n/a   Insurance Primary/Secondary: [employer] / N/A     # Auth Visits: 8            Subjective: calf muscles have been feeling ok; stretches helping     Pain: 0/10      Objective:   See flowsheet      Assessment:   Improving myofascial restrictions throughout medial calf musculature (however still significantly tight)  Improved (B) ankle DF since consistent stretching. Tolerating exercises well with no increase in symptoms   From a functional perspective, she is walking longer distances and standing for longer periods of time.  (Not having much heel pain at all)      Goals:   Pt will demonstrate improved DF AROM by 5-8 degrees to promote proper foot clearance during gait and greater ease descending stairs without compensation  Pt will have increased ankle strength to 5/5 throughout for improved ankle control with gait and stair negotiation  Pt will report less pain and restriction noted with first steps in the morning when getting out of bed or from prolonged nonweightbearing position  Pt will demonstrate improved ambulatory pattern with good push off and less avoidance of terminal stance   Pt will report <4/10 pain with work and home activities   Pt will be independent and compliant with comprehensive HEP to maintain progress achieved in PT     Plan: improve soft tissue flexibility (calf, PF, HS)  Date: 8/11/2023  TX#: 2/8 Date: 8/15/23               TX#: 3/8 Date: 8/17/23                TX#: 4/8 Date: 8/22/23                 TX#: 5/8 Date: 8/24/23  Tx#: 6/8   Manual Therapy Manual Therapy Manual Therapy Manual Therapy Manual Therapy   STM/IASTM (B) calf musculature  STM/IASTM (B) calf musculature  IASTM to (L) calf & PF   IASTM to (L) calf & PF IASTM to (B) calf &   PT assisted HS stretches    TherEx TherEx TherEx TherEx TherEx   Long sitting calf stretch (knee straight and bent) 2 min each  Long sitting calf stretch (knee straight and bent) 2 min each  Long sitting calf stretch (knee straight and bent) 2 min each  Long sitting calf stretch (knee straight and bent) 2 min each  Long sitting calf stretch (knee straight and bent) 2 min each   PT assisted HS stretch 1 min x 2 each PF ball rolls x 3  min PF ball rolls x 3  min PF ball rolls x 3  min DF MWM on 6 inch step x 15 each    Balance board PF/DF x 2 min  PF stretch x 10 (10 seconds) Seated HS stretch 30 sec x 3 (B) Seated HS stretch 30 sec x 3 (B) NuStep x 5 min (L4)   PF ball rolls x 2 min Balance board PF/DF x 3 min DF MWM on 6 inch step x 15 each  DF MWM on 6 inch step x 15 each  SB calf stretch 1 min x 2    PT assisted HS stretch 1 min (B)  SB calf stretch 1 min x 2 SB calf stretch 1 min x 2       Balance board F-B x 2 min    HEP: Seated PF self release, Seated PF stretch, Seated calf stretch    Charges: Manual 2;  TherEx 1       Total Timed Treatment: 45 min  Total Treatment Time: 45 min

## 2023-08-29 ENCOUNTER — TELEPHONE (OUTPATIENT)
Dept: INTERNAL MEDICINE CLINIC | Facility: CLINIC | Age: 58
End: 2023-08-29

## 2023-08-31 ENCOUNTER — HOSPITAL ENCOUNTER (OUTPATIENT)
Age: 58
Discharge: ANOTHER HOSPITAL | End: 2023-08-31
Payer: MEDICAID

## 2023-08-31 ENCOUNTER — APPOINTMENT (OUTPATIENT)
Dept: CT IMAGING | Facility: HOSPITAL | Age: 58
End: 2023-08-31
Attending: NURSE PRACTITIONER
Payer: MEDICAID

## 2023-08-31 ENCOUNTER — HOSPITAL ENCOUNTER (EMERGENCY)
Facility: HOSPITAL | Age: 58
Discharge: HOME | End: 2023-08-31
Payer: MEDICAID

## 2023-08-31 ENCOUNTER — OFFICE VISIT (OUTPATIENT)
Dept: PODIATRY CLINIC | Facility: CLINIC | Age: 58
End: 2023-08-31

## 2023-08-31 VITALS
BODY MASS INDEX: 55 KG/M2 | DIASTOLIC BLOOD PRESSURE: 83 MMHG | HEART RATE: 52 BPM | WEIGHT: 293 LBS | TEMPERATURE: 98 F | RESPIRATION RATE: 15 BRPM | SYSTOLIC BLOOD PRESSURE: 132 MMHG | OXYGEN SATURATION: 96 %

## 2023-08-31 VITALS
TEMPERATURE: 97 F | OXYGEN SATURATION: 96 % | SYSTOLIC BLOOD PRESSURE: 143 MMHG | RESPIRATION RATE: 16 BRPM | HEART RATE: 66 BPM | DIASTOLIC BLOOD PRESSURE: 93 MMHG

## 2023-08-31 DIAGNOSIS — M72.2 PLANTAR FASCIITIS OF LEFT FOOT: ICD-10-CM

## 2023-08-31 DIAGNOSIS — M21.612 HALLUX VALGUS WITH BUNIONS OF LEFT FOOT: Primary | ICD-10-CM

## 2023-08-31 DIAGNOSIS — R42 DIZZINESS: Primary | ICD-10-CM

## 2023-08-31 DIAGNOSIS — Q66.212 METATARSUS PRIMUS VARUS OF LEFT FOOT: ICD-10-CM

## 2023-08-31 DIAGNOSIS — M79.672 INFLAMMATORY HEEL PAIN, LEFT: ICD-10-CM

## 2023-08-31 DIAGNOSIS — R11.0 NAUSEA: ICD-10-CM

## 2023-08-31 DIAGNOSIS — R51.9 ACUTE NONINTRACTABLE HEADACHE, UNSPECIFIED HEADACHE TYPE: Primary | ICD-10-CM

## 2023-08-31 DIAGNOSIS — M77.32 CALCANEAL SPUR OF LEFT FOOT: ICD-10-CM

## 2023-08-31 DIAGNOSIS — M20.12 HALLUX VALGUS WITH BUNIONS OF LEFT FOOT: Primary | ICD-10-CM

## 2023-08-31 DIAGNOSIS — I10 HYPERTENSION, UNSPECIFIED TYPE: ICD-10-CM

## 2023-08-31 LAB
ANION GAP SERPL CALC-SCNC: 3 MMOL/L (ref 0–18)
BASOPHILS # BLD AUTO: 0.05 X10(3) UL (ref 0–0.2)
BASOPHILS NFR BLD AUTO: 0.6 %
BILIRUB UR QL: NEGATIVE
BUN BLD-MCNC: 10 MG/DL (ref 7–18)
BUN/CREAT SERPL: 15.9 (ref 10–20)
CALCIUM BLD-MCNC: 9.4 MG/DL (ref 8.5–10.1)
CHLORIDE SERPL-SCNC: 107 MMOL/L (ref 98–112)
CLARITY UR: CLEAR
CO2 SERPL-SCNC: 31 MMOL/L (ref 21–32)
CREAT BLD-MCNC: 0.63 MG/DL
DEPRECATED RDW RBC AUTO: 44.3 FL (ref 35.1–46.3)
EGFRCR SERPLBLD CKD-EPI 2021: 103 ML/MIN/1.73M2 (ref 60–?)
EOSINOPHIL # BLD AUTO: 0.2 X10(3) UL (ref 0–0.7)
EOSINOPHIL NFR BLD AUTO: 2.5 %
ERYTHROCYTE [DISTWIDTH] IN BLOOD BY AUTOMATED COUNT: 13.1 % (ref 11–15)
GLUCOSE BLD-MCNC: 90 MG/DL (ref 70–99)
GLUCOSE UR-MCNC: NORMAL MG/DL
HCT VFR BLD AUTO: 43.6 %
HGB BLD-MCNC: 14.1 G/DL
HGB UR QL STRIP.AUTO: NEGATIVE
IMM GRANULOCYTES # BLD AUTO: 0.01 X10(3) UL (ref 0–1)
IMM GRANULOCYTES NFR BLD: 0.1 %
KETONES UR-MCNC: NEGATIVE MG/DL
LEUKOCYTE ESTERASE UR QL STRIP.AUTO: NEGATIVE
LYMPHOCYTES # BLD AUTO: 2.88 X10(3) UL (ref 1–4)
LYMPHOCYTES NFR BLD AUTO: 36 %
MCH RBC QN AUTO: 29.5 PG (ref 26–34)
MCHC RBC AUTO-ENTMCNC: 32.3 G/DL (ref 31–37)
MCV RBC AUTO: 91.2 FL
MONOCYTES # BLD AUTO: 0.63 X10(3) UL (ref 0.1–1)
MONOCYTES NFR BLD AUTO: 7.9 %
NEUTROPHILS # BLD AUTO: 4.22 X10 (3) UL (ref 1.5–7.7)
NEUTROPHILS # BLD AUTO: 4.22 X10(3) UL (ref 1.5–7.7)
NEUTROPHILS NFR BLD AUTO: 52.9 %
NITRITE UR QL STRIP.AUTO: NEGATIVE
OSMOLALITY SERPL CALC.SUM OF ELEC: 291 MOSM/KG (ref 275–295)
PH UR: 6.5 [PH] (ref 5–8)
PLATELET # BLD AUTO: 224 10(3)UL (ref 150–450)
POTASSIUM SERPL-SCNC: 3.8 MMOL/L (ref 3.5–5.1)
PROT UR-MCNC: NEGATIVE MG/DL
RBC # BLD AUTO: 4.78 X10(6)UL
SODIUM SERPL-SCNC: 141 MMOL/L (ref 136–145)
SP GR UR STRIP: 1.02 (ref 1–1.03)
TROPONIN I HIGH SENSITIVITY: 6 NG/L
UROBILINOGEN UR STRIP-ACNC: NORMAL
WBC # BLD AUTO: 8 X10(3) UL (ref 4–11)

## 2023-08-31 PROCEDURE — 81003 URINALYSIS AUTO W/O SCOPE: CPT | Performed by: NURSE PRACTITIONER

## 2023-08-31 PROCEDURE — 93005 ELECTROCARDIOGRAM TRACING: CPT

## 2023-08-31 PROCEDURE — 70450 CT HEAD/BRAIN W/O DYE: CPT | Performed by: NURSE PRACTITIONER

## 2023-08-31 PROCEDURE — 93010 ELECTROCARDIOGRAM REPORT: CPT

## 2023-08-31 PROCEDURE — 80048 BASIC METABOLIC PNL TOTAL CA: CPT | Performed by: NURSE PRACTITIONER

## 2023-08-31 PROCEDURE — 96374 THER/PROPH/DIAG INJ IV PUSH: CPT

## 2023-08-31 PROCEDURE — 84484 ASSAY OF TROPONIN QUANT: CPT | Performed by: NURSE PRACTITIONER

## 2023-08-31 PROCEDURE — 85025 COMPLETE CBC W/AUTO DIFF WBC: CPT | Performed by: NURSE PRACTITIONER

## 2023-08-31 PROCEDURE — 99284 EMERGENCY DEPT VISIT MOD MDM: CPT

## 2023-08-31 PROCEDURE — 99213 OFFICE O/P EST LOW 20 MIN: CPT

## 2023-08-31 RX ORDER — HYDROCODONE BITARTRATE AND ACETAMINOPHEN 10; 325 MG/1; MG/1
1-2 TABLET ORAL EVERY 6 HOURS PRN
Qty: 10 TABLET | Refills: 0 | Status: SHIPPED | OUTPATIENT
Start: 2023-08-31 | End: 2023-09-05

## 2023-08-31 RX ORDER — MORPHINE SULFATE 4 MG/ML
4 INJECTION, SOLUTION INTRAMUSCULAR; INTRAVENOUS ONCE
Status: COMPLETED | OUTPATIENT
Start: 2023-08-31 | End: 2023-08-31

## 2023-09-01 LAB
ATRIAL RATE: 66 BPM
P AXIS: -2 DEGREES
P-R INTERVAL: 176 MS
Q-T INTERVAL: 398 MS
QRS DURATION: 88 MS
QTC CALCULATION (BEZET): 417 MS
R AXIS: -24 DEGREES
T AXIS: 26 DEGREES
VENTRICULAR RATE: 66 BPM

## 2023-09-01 NOTE — ED INITIAL ASSESSMENT (HPI)
Sent from [address] [place] [address], x1 day headaches and dizziness, elevated BP readings (compliant with meds, currently normotensive).   Denies sick contacts, no respiratory distress, afebrile

## 2023-09-01 NOTE — ED INITIAL ASSESSMENT (HPI)
Patient arrives ambulatory with daughter with c/o hypertension, nausea, vomiting, upset stomach, dizziness, headache, right leg pain since yesterday, Denies chest pain, denies shortness of breath.

## 2023-09-01 NOTE — ED QUICK NOTES
This RN received report from Tanika Camp 97 Completed    Plan of Care reviewed. Waiting for CT read>DC. Elimination needs assessed. Patient resting in bed, speaking in full sentences. Daughter at bedside. Pt on cardiac monitor, NSR, for frequent VS assessments. No new requests at this time. Bed is locked and in lowest position. Call light within reach.

## 2023-09-04 ENCOUNTER — TELEPHONE (OUTPATIENT)
Dept: PODIATRY CLINIC | Facility: CLINIC | Age: 58
End: 2023-09-04

## 2023-09-04 DIAGNOSIS — M20.12 HALLUX VALGUS OF LEFT FOOT: ICD-10-CM

## 2023-09-04 DIAGNOSIS — Q66.212 METATARSUS PRIMUS VARUS OF LEFT FOOT: Primary | ICD-10-CM

## 2023-09-05 DIAGNOSIS — Q66.212 METATARSUS PRIMUS VARUS OF LEFT FOOT: Primary | ICD-10-CM

## 2023-09-05 DIAGNOSIS — M20.12 HALLUX VALGUS OF LEFT FOOT: ICD-10-CM

## 2023-09-05 NOTE — TELEPHONE ENCOUNTER
Procedure: Lapidus bunionectomy with plate and screw fixation, possible Akin osteotomy with internal fixation  CPT code: 19185, 16959  Length of Surgery: 2.5 hours  Any Instruments: Mini power, mini fluoroscopy, Guillermo joint prep kit and Lapidus system  Call patient: ASAP  Anesthesia: Gen  Location: [address] [place] [address] Road: [facility]  Pacemaker: No  Anticoagulants: No  Nickel Allergy: No  Latex Allergy: No  Diagnosis/ICD Code:   (K81.480) Metatarsus primus varus of left foot  (primary encounter diagnosis)  Plan:    (M20.12) Hallux valgus of left foot  Plan:

## 2023-09-05 NOTE — TELEPHONE ENCOUNTER
SPOKE WITH PATIENT'S SON AND GOT SURGERY SCHEDULED FOR 10/13 AT [facility]. WENT OVER PRE OP INSTRUCTIONS & ALSO BOOKED POST OP APPOINTMENTS AT THIS TIME. PRE [facility]

## 2023-09-06 ENCOUNTER — TELEPHONE (OUTPATIENT)
Dept: PODIATRY CLINIC | Facility: CLINIC | Age: 58
End: 2023-09-06

## 2023-09-06 NOTE — TELEPHONE ENCOUNTER
Please call doctors office see number below and inquire what kind of anesthesia patient is having general?epidural? Nerve  block?

## 2023-09-06 NOTE — TELEPHONE ENCOUNTER
The patient is scheduled for outpatient foot surgery with Dr. Chau Farfan on 10/13/2023 for the following procedure(s): Lapidus bunionectomy with plate and screw fixation, possible Akin osteotomy with internal fixation. The patient has been advised to contact your office for pre-operative clearance. The patient needs the following test/exams    __X__ History and Physical (H&P) may be no older that THIRTY (30) days prior to surgery    ____ EKG for patients that are age 48 or older, have hypertension, diabetes or a history of cardiac disease or are obese. This should be no older than 6 months prior to surgery. ____ Complete Metabolic Panel (CMP) for patients that are age 72 and older, have hypertension, diabetes or heart problems, are taking any heart medication or are obese. This should be no older that 3 months prior to surgery. Please include any other test you deem necessary for medical clearance.

## 2023-09-07 NOTE — TELEPHONE ENCOUNTER
Spoke to patient on 9/6/2020 free at length spent about 20 minutes talking about your surgery what it involves recovery etc. advised her that she needs to be seen for appointment before surgery, she stated that she probably will not go through with the surgery will speak to her children. Start talking about her weight and other issues I gave her appointment on September 23, 2023 Saturday so family can bring her for appointment to address all her issues.   I did advise that she was given referral to do physical therapy for her neck it is available in the system, currently she is receiving treatment for plantars fasciitis

## 2023-09-07 NOTE — TELEPHONE ENCOUNTER
Called Dr. Felix Gene office and spoke to [facility].  Asked what type of anesthesia they will be using for patient's surgery and Felicita stated it will be general anesthesia.

## 2023-09-08 ENCOUNTER — OFFICE VISIT (OUTPATIENT)
Facility: LOCATION | Age: 58
End: 2023-09-08
Attending: PODIATRIST
Payer: MEDICAID

## 2023-09-08 PROCEDURE — 97110 THERAPEUTIC EXERCISES: CPT

## 2023-09-08 PROCEDURE — 97140 MANUAL THERAPY 1/> REGIONS: CPT

## 2023-09-08 NOTE — PROGRESS NOTES
Diagnosis:   Plantar fasciitis of left foot (M72.2)  Inflammatory heel pain, left (R38.010)  Calcaneal spur of left foot (M77.32)         Referring Provider: Gabriela  Date of Evaluation:    8/8/23    Precautions:  None Next MD visit:   none scheduled  Date of Surgery: n/a   Insurance Primary/Secondary: GRICELDA Lang / N/A     # Auth Visits: 8            Subjective: not having any heel pain; some discomfort in the legs      Pain: 0/10      Objective:   See flowsheet      Assessment:   Improving myofascial restrictions throughout medial calf musculature   Emphasized continue stretching at home to assure improved flexibility and decreased pain  Tolerated session well without any increased pain      Goals:   Pt will demonstrate improved DF AROM by 5-8 degrees to promote proper foot clearance during gait and greater ease descending stairs without compensation  Pt will have increased ankle strength to 5/5 throughout for improved ankle control with gait and stair negotiation  Pt will report less pain and restriction noted with first steps in the morning when getting out of bed or from prolonged nonweightbearing position  Pt will demonstrate improved ambulatory pattern with good push off and less avoidance of terminal stance   Pt will report <4/10 pain with work and home activities   Pt will be independent and compliant with comprehensive HEP to maintain progress achieved in PT     Plan: improve soft tissue flexibility (calf, PF, HS)  Date: 8/11/2023  TX#: 2/8 Date: 8/15/23               TX#: 3/8 Date: 8/17/23                TX#: 4/8 Date: 8/22/23                 TX#: 5/8 Date: 8/24/23  Tx#: 6/8 Date: 9/8/23  Tx#: 7/8   Manual Therapy Manual Therapy Manual Therapy Manual Therapy Manual Therapy Manual Therapy   STM/IASTM (B) calf musculature  STM/IASTM (B) calf musculature  IASTM to (L) calf & PF   IASTM to (L) calf & PF IASTM to (B) calf &   PT assisted HS stretches  IASTM/STM to (B) calf    TherEx TherEx TherEx TherEx TherEx TherEx   Long sitting calf stretch (knee straight and bent) 2 min each  Long sitting calf stretch (knee straight and bent) 2 min each  Long sitting calf stretch (knee straight and bent) 2 min each  Long sitting calf stretch (knee straight and bent) 2 min each  Long sitting calf stretch (knee straight and bent) 2 min each Long sitting calf stretch (knee straight and bent) 2 min each   PT assisted HS stretch 1 min x 2 each PF ball rolls x 3  min PF ball rolls x 3  min PF ball rolls x 3  min DF MWM on 6 inch step x 15 each  DF MWM on 6 inch step x 15 each    Balance board PF/DF x 2 min  PF stretch x 10 (10 seconds) Seated HS stretch 30 sec x 3 (B) Seated HS stretch 30 sec x 3 (B) NuStep x 5 min (L4) SB calf stretch 1 min x 2   PF ball rolls x 2 min Balance board PF/DF x 3 min DF MWM on 6 inch step x 15 each  DF MWM on 6 inch step x 15 each  SB calf stretch 1 min x 2 Balance board F-B x 2 min    PT assisted HS stretch 1 min (B)  SB calf stretch 1 min x 2 SB calf stretch 1 min x 2        Balance board F-B x 2 min     HEP: Seated PF self release, Seated PF stretch, Seated calf stretch    Charges: Manual 2;  TherEx 1       Total Timed Treatment: 45 min  Total Treatment Time: 45 min

## 2023-09-11 ENCOUNTER — TELEPHONE (OUTPATIENT)
Dept: PODIATRY CLINIC | Facility: CLINIC | Age: 58
End: 2023-09-11

## 2023-09-11 DIAGNOSIS — M20.12 HALLUX VALGUS OF LEFT FOOT: ICD-10-CM

## 2023-09-11 DIAGNOSIS — Q66.212 METATARSUS PRIMUS VARUS OF LEFT FOOT: Primary | ICD-10-CM

## 2023-09-11 NOTE — TELEPHONE ENCOUNTER
SW patient along with  ID# [id number]. Patient expressed that she wants to cancel procedure because she wants to possibly have another consultation and think about her living situation (many stairs in her home) prior to having this procedure.

## 2023-09-12 ENCOUNTER — OFFICE VISIT (OUTPATIENT)
Facility: LOCATION | Age: 58
End: 2023-09-12
Attending: PODIATRIST
Payer: MEDICAID

## 2023-09-12 PROCEDURE — 97140 MANUAL THERAPY 1/> REGIONS: CPT

## 2023-09-12 PROCEDURE — 97110 THERAPEUTIC EXERCISES: CPT

## 2023-09-21 ENCOUNTER — OFFICE VISIT (OUTPATIENT)
Dept: PODIATRY CLINIC | Facility: CLINIC | Age: 58
End: 2023-09-21

## 2023-09-21 DIAGNOSIS — M20.12 HALLUX VALGUS OF LEFT FOOT: ICD-10-CM

## 2023-09-21 DIAGNOSIS — M79.671 FOOT PAIN, BILATERAL: ICD-10-CM

## 2023-09-21 DIAGNOSIS — M79.672 FOOT PAIN, BILATERAL: ICD-10-CM

## 2023-09-21 DIAGNOSIS — Q66.212 METATARSUS PRIMUS VARUS OF LEFT FOOT: Primary | ICD-10-CM

## 2023-09-23 ENCOUNTER — OFFICE VISIT (OUTPATIENT)
Dept: INTERNAL MEDICINE CLINIC | Facility: CLINIC | Age: 58
End: 2023-09-23

## 2023-09-23 ENCOUNTER — LAB ENCOUNTER (OUTPATIENT)
Dept: LAB | Age: 58
End: 2023-09-23
Attending: INTERNAL MEDICINE
Payer: MEDICAID

## 2023-09-23 VITALS
WEIGHT: 293 LBS | SYSTOLIC BLOOD PRESSURE: 135 MMHG | HEIGHT: 62 IN | BODY MASS INDEX: 53.92 KG/M2 | HEART RATE: 82 BPM | DIASTOLIC BLOOD PRESSURE: 85 MMHG

## 2023-09-23 DIAGNOSIS — E66.01 CLASS 3 SEVERE OBESITY DUE TO EXCESS CALORIES WITH SERIOUS COMORBIDITY AND BODY MASS INDEX (BMI) OF 50.0 TO 59.9 IN ADULT (HCC): Primary | ICD-10-CM

## 2023-09-23 DIAGNOSIS — Z13.21 ENCOUNTER FOR VITAMIN DEFICIENCY SCREENING: ICD-10-CM

## 2023-09-23 DIAGNOSIS — K76.0 HEPATIC STEATOSIS: ICD-10-CM

## 2023-09-23 DIAGNOSIS — I83.813 VARICOSE VEINS OF LOWER EXTREMITY WITH PAIN, BILATERAL: ICD-10-CM

## 2023-09-23 DIAGNOSIS — E66.01 CLASS 3 SEVERE OBESITY DUE TO EXCESS CALORIES WITH SERIOUS COMORBIDITY AND BODY MASS INDEX (BMI) OF 50.0 TO 59.9 IN ADULT (HCC): ICD-10-CM

## 2023-09-23 LAB
ALBUMIN SERPL-MCNC: 3.6 G/DL (ref 3.4–5)
ALBUMIN/GLOB SERPL: 0.9 {RATIO} (ref 1–2)
ALP LIVER SERPL-CCNC: 100 U/L
ALT SERPL-CCNC: 110 U/L
ANION GAP SERPL CALC-SCNC: 8 MMOL/L (ref 0–18)
AST SERPL-CCNC: 58 U/L (ref 15–37)
BASOPHILS # BLD AUTO: 0.04 X10(3) UL (ref 0–0.2)
BASOPHILS NFR BLD AUTO: 0.6 %
BILIRUB SERPL-MCNC: 0.7 MG/DL (ref 0.1–2)
BUN BLD-MCNC: 9 MG/DL (ref 7–18)
BUN/CREAT SERPL: 11.4 (ref 10–20)
CALCIUM BLD-MCNC: 9.4 MG/DL (ref 8.5–10.1)
CHLORIDE SERPL-SCNC: 110 MMOL/L (ref 98–112)
CO2 SERPL-SCNC: 26 MMOL/L (ref 21–32)
CREAT BLD-MCNC: 0.79 MG/DL
DEPRECATED RDW RBC AUTO: 42.9 FL (ref 35.1–46.3)
EGFRCR SERPLBLD CKD-EPI 2021: 87 ML/MIN/1.73M2 (ref 60–?)
EOSINOPHIL # BLD AUTO: 0.18 X10(3) UL (ref 0–0.7)
EOSINOPHIL NFR BLD AUTO: 2.7 %
ERYTHROCYTE [DISTWIDTH] IN BLOOD BY AUTOMATED COUNT: 12.8 % (ref 11–15)
EST. AVERAGE GLUCOSE BLD GHB EST-MCNC: 128 MG/DL (ref 68–126)
FASTING STATUS PATIENT QL REPORTED: NO
GLOBULIN PLAS-MCNC: 4.1 G/DL (ref 2.8–4.4)
GLUCOSE BLD-MCNC: 112 MG/DL (ref 70–99)
HBA1C MFR BLD: 6.1 % (ref ?–5.7)
HCT VFR BLD AUTO: 43.1 %
HGB BLD-MCNC: 13.7 G/DL
IMM GRANULOCYTES # BLD AUTO: 0.01 X10(3) UL (ref 0–1)
IMM GRANULOCYTES NFR BLD: 0.2 %
LYMPHOCYTES # BLD AUTO: 2.38 X10(3) UL (ref 1–4)
LYMPHOCYTES NFR BLD AUTO: 36.1 %
MCH RBC QN AUTO: 29 PG (ref 26–34)
MCHC RBC AUTO-ENTMCNC: 31.8 G/DL (ref 31–37)
MCV RBC AUTO: 91.3 FL
MONOCYTES # BLD AUTO: 0.58 X10(3) UL (ref 0.1–1)
MONOCYTES NFR BLD AUTO: 8.8 %
NEUTROPHILS # BLD AUTO: 3.41 X10 (3) UL (ref 1.5–7.7)
NEUTROPHILS # BLD AUTO: 3.41 X10(3) UL (ref 1.5–7.7)
NEUTROPHILS NFR BLD AUTO: 51.6 %
OSMOLALITY SERPL CALC.SUM OF ELEC: 297 MOSM/KG (ref 275–295)
PLATELET # BLD AUTO: 213 10(3)UL (ref 150–450)
POTASSIUM SERPL-SCNC: 4.3 MMOL/L (ref 3.5–5.1)
PROT SERPL-MCNC: 7.7 G/DL (ref 6.4–8.2)
RBC # BLD AUTO: 4.72 X10(6)UL
SODIUM SERPL-SCNC: 144 MMOL/L (ref 136–145)
T3FREE SERPL-MCNC: 2.68 PG/ML (ref 2.4–4.2)
T4 FREE SERPL-MCNC: 1 NG/DL (ref 0.8–1.7)
TSI SER-ACNC: 1.74 MIU/ML (ref 0.36–3.74)
VIT B12 SERPL-MCNC: 634 PG/ML (ref 193–986)
VIT D+METAB SERPL-MCNC: 23.1 NG/ML (ref 30–100)
WBC # BLD AUTO: 6.6 X10(3) UL (ref 4–11)

## 2023-09-23 PROCEDURE — 84443 ASSAY THYROID STIM HORMONE: CPT

## 2023-09-23 PROCEDURE — 80053 COMPREHEN METABOLIC PANEL: CPT

## 2023-09-23 PROCEDURE — 99396 PREV VISIT EST AGE 40-64: CPT | Performed by: INTERNAL MEDICINE

## 2023-09-23 PROCEDURE — 84481 FREE ASSAY (FT-3): CPT

## 2023-09-23 PROCEDURE — 83036 HEMOGLOBIN GLYCOSYLATED A1C: CPT

## 2023-09-23 PROCEDURE — 36415 COLL VENOUS BLD VENIPUNCTURE: CPT

## 2023-09-23 PROCEDURE — 3075F SYST BP GE 130 - 139MM HG: CPT | Performed by: INTERNAL MEDICINE

## 2023-09-23 PROCEDURE — 82607 VITAMIN B-12: CPT

## 2023-09-23 PROCEDURE — 85025 COMPLETE CBC W/AUTO DIFF WBC: CPT

## 2023-09-23 PROCEDURE — 82306 VITAMIN D 25 HYDROXY: CPT

## 2023-09-23 PROCEDURE — 84439 ASSAY OF FREE THYROXINE: CPT

## 2023-09-23 PROCEDURE — 3079F DIAST BP 80-89 MM HG: CPT | Performed by: INTERNAL MEDICINE

## 2023-09-23 PROCEDURE — 3008F BODY MASS INDEX DOCD: CPT | Performed by: INTERNAL MEDICINE

## 2023-09-23 RX ORDER — OMEPRAZOLE 40 MG/1
40 CAPSULE, DELAYED RELEASE ORAL DAILY
Qty: 30 CAPSULE | Refills: 3 | Status: SHIPPED | OUTPATIENT
Start: 2023-09-23

## 2023-09-23 RX ORDER — DULOXETIN HYDROCHLORIDE 30 MG/1
30 CAPSULE, DELAYED RELEASE ORAL DAILY
Qty: 30 CAPSULE | Refills: 1 | Status: SHIPPED | OUTPATIENT
Start: 2023-09-23

## 2023-09-30 NOTE — PROGRESS NOTES
Wei Green is a 62year old female. Patient presents with: Foot Pain: Use [place]  Eduardo Campos IJ#[id number]- L foot - would like a thorough discussion about surgery , per pt she is worried about what will happen to her after sx regarding her health and the aftercare- denies any pain at this time- was seen by PT w/ some improvement        HPI:   Patient presents to the clinic using   She wanted to talk about her surgery that she had canceled. She does have stairs to climb and therefore does not feel she would be a good candidate for it. Especially considering her weight. At today's visit reviewed nurse's history as taken above, allergies medications and medical history as documented below. All changes duly noted  Allergies: Patient has no known allergies. Current Outpatient Medications   Medication Sig Dispense Refill    DULoxetine 30 MG Oral Cap DR Particles Take 1 capsule (30 mg total) by mouth daily. 30 capsule 1    Omeprazole 40 MG Oral Capsule Delayed Release Take 1 capsule (40 mg total) by mouth daily. 30 capsule 3    azelastine 0.1 % Nasal Solution 2 sprays by Nasal route 2 (two) times daily. 30 mL 0    mometasone 0.1 % External Ointment Apply 1 Application topically daily. Apply to entrance of ear canal before sleep 1 each 0    metoprolol succinate ER 25 MG Oral Tablet 24 Hr Take 1 tablet (25 mg total) by mouth daily. 90 tablet 1    valsartan 80 MG Oral Tab Take 1 tablet (80 mg total) by mouth daily. In the evening 90 tablet 3      Past Medical History:   Diagnosis Date    Hypertension     Varicose vein       History reviewed. No pertinent surgical history. History reviewed. No pertinent family history.    Social History    Socioeconomic History      Marital status:     Tobacco Use      Smoking status: Never      Smokeless tobacco: Never    Vaping Use      Vaping Use: Never used    Substance and Sexual Activity      Alcohol use: Not Currently      Drug use: Never REVIEW OF SYSTEMS:   Today reviewed systens as documented below  GENERAL HEALTH: feels well otherwise  SKIN: Refer to exam below  RESPIRATORY: denies shortness of breath with exertion  CARDIOVASCULAR: denies chest pain on exertion  GI: denies abdominal pain and denies heartburn  NEURO: denies headaches    EXAM:   There were no vitals taken for this visit. GENERAL: well developed, well nourished, in no apparent distress  EXTREMITIES:   1. Integument: She has an enlarged medial eminence on both feet the left is a little worse than the right   2. Vascular: Patient has palpable pulses   3. Neurologic: Has intact sensorium   4. Musculoskeletal: Patient has good muscle strength. ASSESSMENT AND PLAN:   Diagnoses and all orders for this visit:    Metatarsus primus varus of left foot  -     DME - External    Hallux valgus of left foot  -     DME - External    Foot pain, bilateral  -     DME - External        Plan: Today I had a very lengthy conversation with the patient and her daughter through the  stating that surgery is probably not the best thing for her right now until she can [facility]. I recommended that instead of having surgery she gets some orthopedic shoes DME prescription was dispensed to [address] where they can get her into a more comfortable enclosed shoe she can wear sandals and things that do not rub that area without pain but she cannot wear enclosed shoes therefore we will see if an orthopedic style shoe is a better alternative for her than surgery. The patient indicates understanding of these issues and agrees to the plan.     Shamika Rowell DPM

## 2023-10-17 ENCOUNTER — OFFICE VISIT (OUTPATIENT)
Dept: SLEEP CENTER | Age: 58
End: 2023-10-17
Attending: STUDENT IN AN ORGANIZED HEALTH CARE EDUCATION/TRAINING PROGRAM
Payer: MEDICAID

## 2023-10-17 DIAGNOSIS — R06.83 SNORING: ICD-10-CM

## 2023-10-17 PROCEDURE — 95806 SLEEP STUDY UNATT&RESP EFFT: CPT

## 2023-10-18 NOTE — PROCEDURES
[facility]  Accredited by the [facility] ([facility])    PATIENT'S NAME: Arcadio Link PHYSICIAN: Hector De MD   REFERRING PHYSICIAN: Hector De MD   PATIENT ACCOUNT #: [de-identified] LOCATION: [address] #: [id number] YOB: 1965   DATE OF STUDY: 10/17/2023       SLEEP STUDY REPORT    STUDY TYPE:  Home sleep test.    INDICATION:  Suspected obstructive sleep apnea (ICD-10 code G47.33) in patient with snoring, witnessed apneic events, daytime sleepiness, tonsillar hypertrophy, and nasal septal deviation. The Hope Sleepiness Scale score is 7/24 and body mass index 55.4. RESULTS:  The patient underwent home sleep test with measurement of her nasal airflow, nasal air pressure, snoring, chest and abdominal wall motion, oximetry, and body position. I have reviewed the entirety of the raw data of this study. During the study, the total recording time is 582 minutes. The lights-out clock time is 8:58 p.m., lights-on clock time is 6:40 a.m. The apnea plus hypopnea index is 67.1 events per hour, and the supine apnea plus hypopnea index is 88.4 events per hour. The average oxygen saturation is 91%, the lowest oxygen saturation is 64%, and the patient spent 27.2% of the testing with saturations 88% or less. The average heart rate was 72 beats per minute. The patient spent approximately 75% of the testing in the supine position. INTERPRETATION:  The data generated from this study is consistent with very severe obstructive sleep apnea (ICD-10 code G47.33). RECOMMENDATIONS:    1. CPAP titration. 2.   Weight loss. 3.   Avoid alcohol. 4.   Avoid sedating drug. 5.   The patient should not drive if at all sleepy. Please do not hesitate to contact me if there is any question whatsoever regarding interpretation of this study.      Dictated By Sherri Manjarrez MD  d: 10/18/2023 15:59:15  t: 10/18/2023 16:13:23  [facility] [id number]  [facility]/    cc: Louise Schulz MD

## 2023-11-08 ENCOUNTER — OFFICE VISIT (OUTPATIENT)
Dept: NEUROLOGY | Facility: CLINIC | Age: 58
End: 2023-11-08
Payer: MEDICAID

## 2023-11-08 VITALS — HEIGHT: 62 IN | WEIGHT: 293 LBS | BODY MASS INDEX: 53.92 KG/M2

## 2023-11-08 DIAGNOSIS — M54.2 NECK PAIN: Primary | ICD-10-CM

## 2023-11-08 DIAGNOSIS — R42 VERTIGO: ICD-10-CM

## 2023-11-08 DIAGNOSIS — F41.9 ANXIETY: ICD-10-CM

## 2023-11-08 DIAGNOSIS — F41.0 PANIC ATTACK: ICD-10-CM

## 2023-11-08 PROCEDURE — 99204 OFFICE O/P NEW MOD 45 MIN: CPT | Performed by: OTHER

## 2023-11-08 PROCEDURE — 3008F BODY MASS INDEX DOCD: CPT | Performed by: OTHER

## 2023-11-08 RX ORDER — NORTRIPTYLINE HYDROCHLORIDE 25 MG/1
CAPSULE ORAL
Qty: 90 CAPSULE | Refills: 3 | Status: SHIPPED | OUTPATIENT
Start: 2023-11-08

## 2023-11-08 NOTE — PROGRESS NOTES
Neurology Initial Visit     Referred By: Dr. Mota ref. provider found    Chief Complaint:   Chief Complaint   Patient presents with    Er F/u     Patient is follow up from ER  for dizziness. Patient daughter states that she still has dizziness , pain in neck and headache. HPI:     Jevon Angelo is a 62year old female, who presents for multiple complaints. Apparently patient came from [facility] recently. She is under significant mount of stress. Obese, history of joint pain going on for a number of years including neck pain. In August 2023 her pain was so severe negative she started hurting her headache, she started feeling dizziness, she felt nauseated and felt spinning sensation. She went to the emergency room. CT of the head was done that was not revealing. Dizziness has improved, but still occurs occasionally for few seconds if she moves her head, but primarily her main concern for the first visit in November 2023 was due to the fact that she continues to have significant mount of neck pain. Leg pain, especially both knees. Apparently she was prescribed Norco and it was actually helpful. .     Past Medical History:   Diagnosis Date    Hypertension     Varicose vein        No past surgical history on file. Social history:  History   Smoking Status    Never   Smokeless Tobacco    Never     History   Alcohol Use Not Currently     History   Drug Use Unknown       No family history on file. Current Outpatient Medications:     nortriptyline 25 MG Oral Cap, Start with 1 pill QHS, for 1 week, then 2 pills qhs foor another week, then 3 pills qhs, Disp: 90 capsule, Rfl: 3    DULoxetine 30 MG Oral Cap DR Particles, Take 1 capsule (30 mg total) by mouth daily. , Disp: 30 capsule, Rfl: 1    Omeprazole 40 MG Oral Capsule Delayed Release, Take 1 capsule (40 mg total) by mouth daily. , Disp: 30 capsule, Rfl: 3    azelastine 0.1 % Nasal Solution, 2 sprays by Nasal route 2 (two) times daily. , Disp: 30 mL, Rfl: 0    mometasone 0.1 % External Ointment, Apply 1 Application topically daily. Apply to entrance of ear canal before sleep, Disp: 1 each, Rfl: 0    metoprolol succinate ER 25 MG Oral Tablet 24 Hr, Take 1 tablet (25 mg total) by mouth daily. , Disp: 90 tablet, Rfl: 1    valsartan 80 MG Oral Tab, Take 1 tablet (80 mg total) by mouth daily. In the evening, Disp: 90 tablet, Rfl: 3    No Known Allergies    ROS:   As in HPI, the rest of the 14 system review was done and was negative      Physical Exam:  Vitals:    11/08/23 0845   Weight: (!) 307 lb (139.3 kg)   Height: 62\"       General: No apparent distress, well nourished, well groomed. Head- Normocephalic, atraumatic  Eyes- No redness or swelling  ENT- Hearing intake, normal glutition  Neck- No masses or adenopathy  Cv: pulses were palpable and normal, no cyanosis or edema     Neurological:     Mental Status- Alert and oriented x3. Normal attention span and concentration  Thought process intact  Memory intact- recent and remote  Mood intact  Fund of knowledge appropriate for education and age    Language intact including: comprehension, naming, repetition, vocabulary    Cranial Nerves:  II.- Visual fields full to confrontation    III, IV, VI- EOM intact, LISSA  V. Facial sensation intact  VII. Face symmetric, no facial weakness  VIII. Hearing intact to whisper. IX. Pallet elevates symmetrically. XI. Shoulder shrug is intact  XII.  Tongue is midline    Motor Exam:  Muscle tone normal  No atrophy or fasciculations  Strength- upper extremities 5/5 proximally and distally                  - lower  extremities 5/5 proximally and distally    Sensory Exam:  Light touch sensation- intact in all 4 extremities    Coordination:  Finger to nose intact  Rapid alternating movements intact    Gait:  Normal posture  Normal physiologic      Labs:    Lab Results   Component Value Date    TSH 1.740 09/23/2023     Lab Results   Component Value Date    HDL 37 (L) 06/28/2023    LDL 136 (H) 06/28/2023    TRIG 194 (H) 06/28/2023     Lab Results   Component Value Date    HGB 13.7 09/23/2023    HCT 43.1 09/23/2023    MCV 91.3 09/23/2023    WBC 6.6 09/23/2023    .0 09/23/2023      Lab Results   Component Value Date    BUN 9 09/23/2023    CA 9.4 09/23/2023     (H) 09/23/2023    AST 58 (H) 09/23/2023    ALB 3.6 09/23/2023     09/23/2023    K 4.3 09/23/2023     09/23/2023    CO2 26.0 09/23/2023      I have reviewed labs. Imaging Studies:  I have independently reviewed imaging. CT of the head was independent reviewed, no acute changes. Assessment   1. Neck pain  We will start with physical therapy. Emphasized importance of weight loss. They are scheduled for the weight loss clinic. In the meantime no treatment will be started slowly tapering up the dose. Hopefully will be helpful for the sleep, mood as well as the pain. - Physical Therapy Referral - Spray Locations    2. Vertigo  Seems to be improving. Possibly peripheral in nature. 3. Anxiety  Patient is coping with significant mount of stress and anxiety and panic attacks, therefore regarding speaking psychologist referral will be placed. -  NAVIGATOR    4. Panic attack         Education and counseling provided to patient. Instructed patient to call my office or seek medical attention immediately if symptoms worsen. Patient verbalized understanding of information given. All questions were answered. All side effects of drugs were discussed. Return to clinic in: Return in about 2 months (around 1/8/2024).     Sudhakar Navas MD

## 2023-11-14 ENCOUNTER — OFFICE VISIT (OUTPATIENT)
Facility: CLINIC | Age: 58
End: 2023-11-14
Payer: MEDICAID

## 2023-11-14 ENCOUNTER — LAB ENCOUNTER (OUTPATIENT)
Dept: LAB | Facility: HOSPITAL | Age: 58
End: 2023-11-14
Attending: STUDENT IN AN ORGANIZED HEALTH CARE EDUCATION/TRAINING PROGRAM
Payer: MEDICAID

## 2023-11-14 ENCOUNTER — TELEPHONE (OUTPATIENT)
Facility: CLINIC | Age: 58
End: 2023-11-14

## 2023-11-14 VITALS
HEART RATE: 88 BPM | SYSTOLIC BLOOD PRESSURE: 126 MMHG | DIASTOLIC BLOOD PRESSURE: 91 MMHG | WEIGHT: 293 LBS | BODY MASS INDEX: 53.92 KG/M2 | HEIGHT: 62 IN

## 2023-11-14 DIAGNOSIS — R74.8 ELEVATED LIVER ENZYMES: Primary | ICD-10-CM

## 2023-11-14 DIAGNOSIS — R74.8 ELEVATED LIVER ENZYMES: ICD-10-CM

## 2023-11-14 DIAGNOSIS — K21.9 GASTROESOPHAGEAL REFLUX DISEASE, UNSPECIFIED WHETHER ESOPHAGITIS PRESENT: Primary | ICD-10-CM

## 2023-11-14 DIAGNOSIS — R10.9 ABDOMINAL PAIN, UNSPECIFIED ABDOMINAL LOCATION: ICD-10-CM

## 2023-11-14 LAB
IGA SERPL-MCNC: 254 MG/DL (ref 40–350)
IGM SERPL-MCNC: 267.7 MG/DL (ref 50–300)
IMMUNOGLOBULIN PNL SER-MCNC: 1422 MG/DL (ref 650–1600)

## 2023-11-14 PROCEDURE — 86364 TISS TRNSGLTMNASE EA IG CLAS: CPT

## 2023-11-14 PROCEDURE — 99245 OFF/OP CONSLTJ NEW/EST HI 55: CPT | Performed by: STUDENT IN AN ORGANIZED HEALTH CARE EDUCATION/TRAINING PROGRAM

## 2023-11-14 PROCEDURE — 3008F BODY MASS INDEX DOCD: CPT | Performed by: STUDENT IN AN ORGANIZED HEALTH CARE EDUCATION/TRAINING PROGRAM

## 2023-11-14 PROCEDURE — 3080F DIAST BP >= 90 MM HG: CPT | Performed by: STUDENT IN AN ORGANIZED HEALTH CARE EDUCATION/TRAINING PROGRAM

## 2023-11-14 PROCEDURE — 82784 ASSAY IGA/IGD/IGG/IGM EACH: CPT

## 2023-11-14 PROCEDURE — 3074F SYST BP LT 130 MM HG: CPT | Performed by: STUDENT IN AN ORGANIZED HEALTH CARE EDUCATION/TRAINING PROGRAM

## 2023-11-14 PROCEDURE — 83516 IMMUNOASSAY NONANTIBODY: CPT

## 2023-11-14 PROCEDURE — 36415 COLL VENOUS BLD VENIPUNCTURE: CPT

## 2023-11-14 RX ORDER — OMEPRAZOLE 40 MG/1
40 CAPSULE, DELAYED RELEASE ORAL
Qty: 180 CAPSULE | Refills: 0 | Status: SHIPPED | OUTPATIENT
Start: 2023-11-14 | End: 2024-02-12

## 2023-11-14 NOTE — TELEPHONE ENCOUNTER
Scheduled for:  [phone]  Provider Name:  Heather Callaway  Date:  11/17/2023  Location:  [facility]  Sedation:  MAC  Time:  10:45 am, (pt is aware to arrive at 9:45 am)  Prep:  NPO after midnight  Meds/Allergies Reconciled?: Physician reviewed   Diagnosis with codes: GERD K21.9, Abdominal Pain R10.9   Was patient informed to call insurance with codes (Y/N): Yes   Referral sent?:  Referral was sent at the time of electronic surgical scheduling. [facility] or [address] notified?:  I sent an electronic request to Endo Scheduling and received a confirmation today. Medication Orders: N/A   Misc Orders: N/A      Further instructions given by staff: I discussed the prep instructions with the patient which she verbally understood. Provided patient with prep instructions and cancellation policy at the time of office visit.

## 2023-11-14 NOTE — H&P
[facility], [facility] - Gastroenterology                                                                                                             Reason for consult: elevated liver enzymes, colon cancer screening    Requesting physician or provider: Amy Lorenzo MD    Chief Complaint   Patient presents with    Elevated Liver Chemistry    Abdominal Pain    Nausea    Heartburn     HPI:   Al[place]ia Arriola is a 62year old year-old woman with history of morbid obesity, Htn, HLD who presents to [address] [place] [address] to discuss elevated liver enzymes, colorectal cancer screening and GERD.    6/2023 CMP was checked and ALT was 89, AST was 50. Bilirubin, alk phos normal. Workup was done by PCP including viral hepatitis serologies (hep A/B/C), smooth muscle antibody, alpha 1 antitrypsin, ceruloplasmin, iron studies and these were negative to be the cause of elevated enzymes. No abdominal pain, nausea, vomiting. No family hx of liver cancer. Colon cancer screening -- no prior colonoscopy. No blood in stool. No family hx of colorectal cancer. GERD/abdominal pain -- heartburn occasionally. Constant abdominal discomfort not associated with eating or drinking. No dysphagia, nausea or vomiting. Takes omeprazole BUT isn't taking before meals but rather after. Constipation -- straining with bowel movements. No blood in stool. No recent medication changes. No change in diet. No improvement in abdominal discomfort with bowel movement. Describes stools as hard. Weight gain -- per patient, no matter what she eats she continues to gain weight.     Prior endoscopies:  none    Soc:  -no smoking  -no Etoh    Wt Readings from Last 6 Encounters:   11/14/23 (!) 307 lb (139.3 kg)   11/08/23 (!) 307 lb (139.3 kg)   09/23/23 (!) 307 lb (139.3 kg)   08/31/23 299 lb 13.2 oz (136 kg)   08/15/23 (!) 301 lb 4.8 oz (136.7 kg)   08/11/23 (!) 301 lb 4.8 oz (136.7 kg) History, Medications, Allergies, ROS:      Past Medical History:   Diagnosis Date    Hypertension     Varicose vein       No past surgical history on file. Family Hx: No family history on file. Social History:   Social History     Socioeconomic History    Marital status:    Tobacco Use    Smoking status: Never    Smokeless tobacco: Never   Vaping Use    Vaping Use: Never used   Substance and Sexual Activity    Alcohol use: Not Currently    Drug use: Never        Medications (Active prior to today's visit):  Current Outpatient Medications   Medication Sig Dispense Refill    nortriptyline 25 MG Oral Cap Start with 1 pill QHS, for 1 week, then 2 pills qhs foor another week, then 3 pills qhs 90 capsule 3    DULoxetine 30 MG Oral Cap DR Particles Take 1 capsule (30 mg total) by mouth daily. 30 capsule 1    Omeprazole 40 MG Oral Capsule Delayed Release Take 1 capsule (40 mg total) by mouth daily. 30 capsule 3    azelastine 0.1 % Nasal Solution 2 sprays by Nasal route 2 (two) times daily. 30 mL 0    mometasone 0.1 % External Ointment Apply 1 Application topically daily. Apply to entrance of ear canal before sleep 1 each 0    metoprolol succinate ER 25 MG Oral Tablet 24 Hr Take 1 tablet (25 mg total) by mouth daily. 90 tablet 1    valsartan 80 MG Oral Tab Take 1 tablet (80 mg total) by mouth daily. In the evening 90 tablet 3       Allergies:   Allergies   Allergen Reactions    Lactose ITCHING       ROS:   CONSTITUTIONAL:  negative for fevers, rigors  EYES:  negative for diplopia   RESPIRATORY:  negative for severe shortness of breath  CARDIOVASCULAR:  negative for crushing sub-sternal chest pain  GASTROINTESTINAL:  see HPI  GENITOURINARY:  negative for dysuria or gross hematuria  INTEGUMENT/BREAST:  SKIN:  negative for jaundice   ALLERGIC/IMMUNOLOGIC:  negative for hay fever  ENDOCRINE:  negative for cold intolerance and heat intolerance  MUSCULOSKELETAL:  negative for joint effusion/severe erythema  BEHAVIOR/PSYCH:  negative for psychotic behavior      PHYSICAL EXAM:   Blood pressure (!) 126/91, pulse 88, height 5' 2\" (1.575 m), weight (!) 307 lb (139.3 kg). Gen- Patient appears comfortable and in no acute discomfort  HEENT: the sclera appears anicteric, oropharynx clear, mucus membranes appear moist  CV- regular rate and rhythm, the extremities are warm and well perfused   Lung- Moves air well; No labored breathing  Abdomen- soft, non-tender exam in all quadrants without rigidity or guarding, non-distended  Skin- No jaundice  Ext: no edema is evident. Neuro- Alert and interactive, and gross movements of extremities normal  Psych - appropriate, non-agitated    Labs/Imaging:     Patient's pertinent labs and imaging were reviewed and discussed with patient today. ASSESSMENT/PLAN:   Luiz Gitelman is a 62year old year-old woman with history of morbid obesity, Htn, HLD who presents to [address] [place] [address] clinic to discuss elevated liver enzymes, colorectal cancer screening and GERD/abdominal discomfort. #Colorectal cancer screening  -High risk anesthesia candidate given BMI > 50.   -No red flag symptoms.   -No family hx of colon cancer.  -Will start with non-invasive testing. #GERD/Abdominal discomfort  -Patient continues to have ongoing symptoms but she is taking PPI therapy after meals and NOT before.  -No nausea, vomiting, dysphagia, weight loss. -Difficult to understand symptoms but she definitely has a component of GERD and the non-response to PPI therapy Is due to her taking after meals. I'm hopeful that with correct administration of PPI symptoms will improve.   -She also has issues with constipation and I suspect this is also contributing to her symptoms.  -Will treat for both constipation and GERD and hope this improves her symptoms. #Elevated liver enzymes  -Has BMI of 56, HTN, HLD and therefore she likely has JONES. No alcohol consumption.  Workup started by PCP unremarkable for an alternative cause.  -Will complete workup today.  -Lifestyle modifications discussed today. Recommendations:  1) Omeprazole 40mg TWICE per day and Take BEFORE meals by 30 to 60 minutes. 2) Use Miralax one capful per day for treatment of constipation. 3) Get labs drawn today to complete workup for liver enzyme elevation. 4) Schedule ultrasound of the liver. 5) Complete cologuard test (stool test sent to your house)  6) Schedule upper endoscopy (EGD) with MAC [Diagnosis: GERD, Abdominal pain]    Prior to this encounter, I spent 5 minutes preparing for the visit, reviewing documents from other specialties as well as from the primary care physician, reviewing prior procedure notes and pathology reports (if available) and going over test results. I spent an additional 60 minutes obtaining history, evaluating the patient including a medically appropriate exam, discussing treatment options and counseling/educating the patient, reviewing the ordering tests/medications/procedures, and completing documentation. In total; 65 minutes was spent during this encounter. Orders This Visit:  No orders of the defined types were placed in this encounter. Meds This Visit:  Requested Prescriptions      No prescriptions requested or ordered in this encounter     Imaging & Referrals:  US LIVER (UST=[id number])     Shira Mead MD  [facility], [facility] Gastroenterology  11/14/2023    This note was partially prepared using [employer] my[employer] voice recognition dictation software. As a result, errors may occur. When identified, these errors have been corrected.  While every attempt is made to correct errors during dictation, discrepancies may still exist.

## 2023-11-14 NOTE — TELEPHONE ENCOUNTER
Attn: PPD Prior Auth    Patient is scheduled for EGD 78371 on 11/17/23. Please obtain prior authorization. Thanks!

## 2023-11-15 LAB
M2 MITOCHONDRIAL AB: <20 UNITS
TTG IGA SER-ACNC: 0.6 U/ML (ref ?–7)

## 2023-11-17 ENCOUNTER — ANESTHESIA EVENT (OUTPATIENT)
Dept: ENDOSCOPY | Facility: HOSPITAL | Age: 58
End: 2023-11-17
Payer: MEDICAID

## 2023-11-17 ENCOUNTER — HOSPITAL ENCOUNTER (OUTPATIENT)
Facility: HOSPITAL | Age: 58
Setting detail: HOSPITAL OUTPATIENT SURGERY
Discharge: HOME | End: 2023-11-17
Attending: STUDENT IN AN ORGANIZED HEALTH CARE EDUCATION/TRAINING PROGRAM | Admitting: STUDENT IN AN ORGANIZED HEALTH CARE EDUCATION/TRAINING PROGRAM
Payer: MEDICAID

## 2023-11-17 ENCOUNTER — ANESTHESIA (OUTPATIENT)
Dept: ENDOSCOPY | Facility: HOSPITAL | Age: 58
End: 2023-11-17
Payer: MEDICAID

## 2023-11-17 VITALS
SYSTOLIC BLOOD PRESSURE: 113 MMHG | HEART RATE: 82 BPM | WEIGHT: 293 LBS | DIASTOLIC BLOOD PRESSURE: 68 MMHG | TEMPERATURE: 98 F | RESPIRATION RATE: 20 BRPM | HEIGHT: 64.96 IN | OXYGEN SATURATION: 93 % | BODY MASS INDEX: 48.82 KG/M2

## 2023-11-17 DIAGNOSIS — K21.9 GASTROESOPHAGEAL REFLUX DISEASE, UNSPECIFIED WHETHER ESOPHAGITIS PRESENT: ICD-10-CM

## 2023-11-17 DIAGNOSIS — R10.9 ABDOMINAL PAIN, UNSPECIFIED ABDOMINAL LOCATION: ICD-10-CM

## 2023-11-17 PROCEDURE — 43239 EGD BIOPSY SINGLE/MULTIPLE: CPT | Performed by: STUDENT IN AN ORGANIZED HEALTH CARE EDUCATION/TRAINING PROGRAM

## 2023-11-17 PROCEDURE — 0DB68ZX EXCISION OF STOMACH, VIA NATURAL OR ARTIFICIAL OPENING ENDOSCOPIC, DIAGNOSTIC: ICD-10-PCS | Performed by: STUDENT IN AN ORGANIZED HEALTH CARE EDUCATION/TRAINING PROGRAM

## 2023-11-17 PROCEDURE — 0DB98ZX EXCISION OF DUODENUM, VIA NATURAL OR ARTIFICIAL OPENING ENDOSCOPIC, DIAGNOSTIC: ICD-10-PCS | Performed by: STUDENT IN AN ORGANIZED HEALTH CARE EDUCATION/TRAINING PROGRAM

## 2023-11-17 RX ORDER — NALOXONE HYDROCHLORIDE 0.4 MG/ML
0.08 INJECTION, SOLUTION INTRAMUSCULAR; INTRAVENOUS; SUBCUTANEOUS ONCE AS NEEDED
Status: DISCONTINUED | OUTPATIENT
Start: 2023-11-17 | End: 2023-11-17

## 2023-11-17 RX ORDER — GLYCOPYRROLATE 0.2 MG/ML
INJECTION, SOLUTION INTRAMUSCULAR; INTRAVENOUS AS NEEDED
Status: DISCONTINUED | OUTPATIENT
Start: 2023-11-17 | End: 2023-11-17 | Stop reason: SURG

## 2023-11-17 RX ORDER — LIDOCAINE HYDROCHLORIDE 10 MG/ML
INJECTION, SOLUTION EPIDURAL; INFILTRATION; INTRACAUDAL; PERINEURAL AS NEEDED
Status: DISCONTINUED | OUTPATIENT
Start: 2023-11-17 | End: 2023-11-17 | Stop reason: SURG

## 2023-11-17 RX ORDER — SODIUM CHLORIDE, SODIUM LACTATE, POTASSIUM CHLORIDE, CALCIUM CHLORIDE 600; 310; 30; 20 MG/100ML; MG/100ML; MG/100ML; MG/100ML
INJECTION, SOLUTION INTRAVENOUS CONTINUOUS
Status: DISCONTINUED | OUTPATIENT
Start: 2023-11-17 | End: 2023-11-17

## 2023-11-17 RX ADMIN — SODIUM CHLORIDE, SODIUM LACTATE, POTASSIUM CHLORIDE, CALCIUM CHLORIDE: 600; 310; 30; 20 INJECTION, SOLUTION INTRAVENOUS at 11:01:00

## 2023-11-17 RX ADMIN — GLYCOPYRROLATE 0.2 MG: 0.2 INJECTION, SOLUTION INTRAMUSCULAR; INTRAVENOUS at 11:02:00

## 2023-11-17 RX ADMIN — SODIUM CHLORIDE, SODIUM LACTATE, POTASSIUM CHLORIDE, CALCIUM CHLORIDE: 600; 310; 30; 20 INJECTION, SOLUTION INTRAVENOUS at 11:11:00

## 2023-11-17 RX ADMIN — LIDOCAINE HYDROCHLORIDE 100 MG: 10 INJECTION, SOLUTION EPIDURAL; INFILTRATION; INTRACAUDAL; PERINEURAL at 11:02:00

## 2023-11-17 NOTE — H&P
History & Physical Examination    Patient Name: Indio Vaughan  MRN: [id number]  [facility]: [id number]  YOB: 1965    Diagnosis: Abdominal pain, EGD today    Medications Prior to Admission   Medication Sig Dispense Refill Last Dose    Omeprazole 40 MG Oral Capsule Delayed Release Take 1 capsule (40 mg total) by mouth 2 (two) times daily before meals. 180 capsule 0     nortriptyline 25 MG Oral Cap Start with 1 pill QHS, for 1 week, then 2 pills qhs foor another week, then 3 pills qhs 90 capsule 3     DULoxetine 30 MG Oral Cap DR Particles Take 1 capsule (30 mg total) by mouth daily. 30 capsule 1     metoprolol succinate ER 25 MG Oral Tablet 24 Hr Take 1 tablet (25 mg total) by mouth daily. 90 tablet 1     valsartan 80 MG Oral Tab Take 1 tablet (80 mg total) by mouth daily. In the evening 90 tablet 3     [] HYDROcodone-acetaminophen  MG Oral Tab Take 1-2 tablets by mouth every 6 (six) hours as needed for Pain. (Patient not taking: Reported on 11/15/2023) 10 tablet 0 Not Taking    [] Fluocinolone Acetonide 0.01 % Otic Oil Place 3 drops in ear(s) in the morning and 3 drops before bedtime. Do all this for 7 days. Into affected ear. (Patient not taking: Reported on 11/15/2023) 20 mL 0 Not Taking    azelastine 0.1 % Nasal Solution 2 sprays by Nasal route 2 (two) times daily. 30 mL 0     mometasone 0.1 % External Ointment Apply 1 Application topically daily. Apply to entrance of ear canal before sleep 1 each 0      No current facility-administered medications for this encounter. Allergies: Allergies   Allergen Reactions    Lactose ITCHING       Past Medical History:   Diagnosis Date    High blood pressure     Hypertension     Shortness of breath     Sleep apnea     Varicose vein      History reviewed. No pertinent surgical history. History reviewed. No pertinent family history.   Social History     Tobacco Use    Smoking status: Never    Smokeless tobacco: Never   Substance Use Topics Alcohol use: Not Currently       SYSTEM Check if Review is Normal Check if Physical Exam is Normal If not normal, please explain:   CHRISTIE [facility] ] [ Jb Zavala  [facility] ] [ María Simental [facility] ] [ Dee Sauceda [employer] ] [ Karolee Klinefelter [facility] ] [ Pushpa Castillo [facility] ] [ X]    OTHER        I have discussed the risks and benefits and alternatives of the procedure with the patient/family. They understand and agree to proceed with plan of care. I have reviewed the History and Physical done within the last 30 days. Any changes noted above.     Erik Rose MD  [facility], [facility] Gastroenterology

## 2023-11-17 NOTE — DISCHARGE INSTRUCTIONS
Home Care Instructions for Gastroscopy with Sedation    Diet:  - Resume your regular diet as tolerated unless otherwise instructed. - Start with light meals to minimize bloating.  - Do not drink alcohol today. Medication:  - If you have questions about resuming your normal medications, please contact your Primary Care Physician. Activities:  - Take it easy today. Do not return to work today. - Do not drive today. - Do not operate any machinery today (including kitchen equipment). - Do not make any critical decisions or sign any paperwork. - Do not exercise today. Gastroscopy:  - You may have a sore throat for 2-3 days following the exam. This is normal. Gargling with warm salt water (1/2 tsp salt to 1 glass warm water) or using throat lozenges will help. - If you experience any sharp pain in your neck, abdomen or chest, vomiting of blood, oral temperature over 100 degrees Fahrenheit, light-headedness or dizziness, or any other problems, contact your doctor. **If unable to reach your doctor, please go to the [facility] Emergency Room**    - Your referring physician will receive a full report of your examination.  - If you do not hear from your doctor's office within two weeks of your biopsy, please call them for your results. You may be able to see your laboratory results in [facility] between 4 and 7 business days. In some cases, your physician may not have viewed the results before they are released to [address] If you have questions regarding your results contact the physician who ordered the test/exam by phone or via [address] by choosing \"Ask a Medical Question. \"

## 2023-11-17 NOTE — OPERATIVE REPORT
ESOPHAGOGASTRODUODENOSCOPY (EGD) REPORT    Judi White     1965 Age 62year old   PCP Viki Patel MD Endoscopist Duran Napier MD       Date of procedure: 23    Procedure: EGD w/ duodenal and gastric biopsies    Pre-operative diagnosis: abdominal pain    Post-operative diagnosis: gastritis, hiatal hernia    Medications: MAC    Complications: none    Procedure:  Informed consent was obtained from the patient after the risks of the procedure were discussed, including but not limited to bleeding, perforation, aspiration, infection, or possibility of a missed lesion. After discussions of the risks/benefits and alternatives to this procedure, as well as the planned sedation, the patient was placed in the left lateral decubitus position and begun on continuous blood pressure pulse oximetry and EKG monitoring and this was maintained throughout the procedure. Once an adequate level of sedation was obtained a bite block was placed. Then the lubricated tip of the [id number]-288 diagnostic video upper endoscope was inserted and advanced using direct visualization into the posterior pharynx and ultimately into the esophagus. The scope was then advanced through the stomach and to the second portion of the duodenum. Complications: None    Findings:      1. Esophagus: The squamocolumnar junction was noted at 34 cm and appeared regular. The diaphragmatic pinch was noted noted at 38 cm from the incisors. 4 cm hiatal hernia. The esophageal mucosa appeared healthy and normal. There was no endoscopic findings of esophagitis, stricture or Capellan's esophagus. 2. Stomach: The stomach distended normally. Normal rugal folds were seen. The pylorus was patent. Retroflexion revealed a normal fundus. The gastric mucosa appeared mildly erythematous but without ulcers. Biopsies were taken with a cold forceps from the antrum, incisura and body for histology.      3. Duodenum: The duodenal mucosa appeared normal in the bulb and 2nd portion of the duodenum. Biopsied. Impression:  -Esophagus: 4 cm hiatal hernia, otherwise normal esophagus.  -Stomach: Mild gastritis, likely not clinically significant or causing symptoms. Biopsied.  -Duodenum: Normal. Biopsied.  -I suspect symptoms of epigastric discomfort are related to non-erosive reflux disease exacerbated by presence of hiatal hernia. Recommend:  1. Await pathology. 2. Continue Omeprazole 40mg twice per day and take before meals.    >>>If tissue was sampled/removed and you have not received your pathology results either by phone or letter within 2 weeks, please call our office at [phone]. Specimens:  Duodenum, gastric.     Blood loss: <1 ml

## 2023-11-18 ENCOUNTER — OFFICE VISIT (OUTPATIENT)
Dept: INTERNAL MEDICINE CLINIC | Facility: CLINIC | Age: 58
End: 2023-11-18
Payer: MEDICAID

## 2023-11-18 VITALS
BODY MASS INDEX: 48.82 KG/M2 | WEIGHT: 293 LBS | HEART RATE: 74 BPM | SYSTOLIC BLOOD PRESSURE: 127 MMHG | HEIGHT: 64.96 IN | DIASTOLIC BLOOD PRESSURE: 87 MMHG

## 2023-11-18 DIAGNOSIS — R91.8 PULMONARY NODULES: ICD-10-CM

## 2023-11-18 DIAGNOSIS — R73.03 PREDIABETES: ICD-10-CM

## 2023-11-18 DIAGNOSIS — E66.01 CLASS 3 SEVERE OBESITY DUE TO EXCESS CALORIES WITH SERIOUS COMORBIDITY AND BODY MASS INDEX (BMI) OF 50.0 TO 59.9 IN ADULT (HCC): Primary | ICD-10-CM

## 2023-11-18 DIAGNOSIS — K59.01 SLOW TRANSIT CONSTIPATION: ICD-10-CM

## 2023-11-18 PROCEDURE — 3008F BODY MASS INDEX DOCD: CPT | Performed by: INTERNAL MEDICINE

## 2023-11-18 PROCEDURE — 3079F DIAST BP 80-89 MM HG: CPT | Performed by: INTERNAL MEDICINE

## 2023-11-18 PROCEDURE — 3074F SYST BP LT 130 MM HG: CPT | Performed by: INTERNAL MEDICINE

## 2023-11-18 PROCEDURE — 99213 OFFICE O/P EST LOW 20 MIN: CPT | Performed by: INTERNAL MEDICINE

## 2023-11-18 RX ORDER — POLYETHYLENE GLYCOL 3350 17 G/17G
17 POWDER, FOR SOLUTION ORAL DAILY
Qty: 238 G | Refills: 5 | Status: SHIPPED | OUTPATIENT
Start: 2023-11-18

## 2023-11-18 RX ORDER — MOMETASONE FUROATE 1 MG/G
1 OINTMENT TOPICAL DAILY
Qty: 1 EACH | Refills: 0 | Status: SHIPPED | OUTPATIENT
Start: 2023-11-18

## 2023-11-20 ENCOUNTER — OFFICE VISIT (OUTPATIENT)
Dept: SLEEP CENTER | Age: 58
End: 2023-11-20
Attending: STUDENT IN AN ORGANIZED HEALTH CARE EDUCATION/TRAINING PROGRAM
Payer: MEDICAID

## 2023-11-20 DIAGNOSIS — G47.33 OSA (OBSTRUCTIVE SLEEP APNEA): ICD-10-CM

## 2023-11-20 PROCEDURE — 95811 POLYSOM 6/>YRS CPAP 4/> PARM: CPT

## 2023-11-28 ENCOUNTER — TELEPHONE (OUTPATIENT)
Facility: CLINIC | Age: 58
End: 2023-11-28

## 2023-12-07 ENCOUNTER — MED REC SCAN ONLY (OUTPATIENT)
Facility: CLINIC | Age: 58
End: 2023-12-07

## 2023-12-13 ENCOUNTER — TELEPHONE (OUTPATIENT)
Dept: OTOLARYNGOLOGY | Facility: CLINIC | Age: 58
End: 2023-12-13

## 2023-12-13 DIAGNOSIS — G47.33 OSA (OBSTRUCTIVE SLEEP APNEA): Primary | ICD-10-CM

## 2023-12-14 ENCOUNTER — TELEPHONE (OUTPATIENT)
Facility: CLINIC | Age: 58
End: 2023-12-14

## 2023-12-14 NOTE — TELEPHONE ENCOUNTER
Dr Jesus Crowley:    LOV with Dr Guero Roach 11/14/2023    Received negative Cologuard results for the pt from [address] [place] [address] and message sent to pt outreach to repeat Cologuard in 3 years

## 2023-12-16 ENCOUNTER — LAB ENCOUNTER (OUTPATIENT)
Dept: LAB | Age: 58
End: 2023-12-16
Attending: INTERNAL MEDICINE
Payer: MEDICAID

## 2023-12-16 DIAGNOSIS — R73.03 PREDIABETES: ICD-10-CM

## 2023-12-16 LAB
ALBUMIN SERPL-MCNC: 4.3 G/DL (ref 3.2–4.8)
ALBUMIN/GLOB SERPL: 1.3 {RATIO} (ref 1–2)
ALP LIVER SERPL-CCNC: 89 U/L
ALT SERPL-CCNC: 85 U/L
ANION GAP SERPL CALC-SCNC: 7 MMOL/L (ref 0–18)
AST SERPL-CCNC: 56 U/L (ref ?–34)
BASOPHILS # BLD AUTO: 0.06 X10(3) UL (ref 0–0.2)
BASOPHILS NFR BLD AUTO: 0.9 %
BILIRUB SERPL-MCNC: 0.6 MG/DL (ref 0.3–1.2)
BUN BLD-MCNC: 11 MG/DL (ref 9–23)
BUN/CREAT SERPL: 15.7 (ref 10–20)
CALCIUM BLD-MCNC: 9.9 MG/DL (ref 8.7–10.4)
CHLORIDE SERPL-SCNC: 105 MMOL/L (ref 98–112)
CO2 SERPL-SCNC: 30 MMOL/L (ref 21–32)
CREAT BLD-MCNC: 0.7 MG/DL
DEPRECATED RDW RBC AUTO: 43.5 FL (ref 35.1–46.3)
EGFRCR SERPLBLD CKD-EPI 2021: 100 ML/MIN/1.73M2 (ref 60–?)
EOSINOPHIL # BLD AUTO: 0.29 X10(3) UL (ref 0–0.7)
EOSINOPHIL NFR BLD AUTO: 4.3 %
ERYTHROCYTE [DISTWIDTH] IN BLOOD BY AUTOMATED COUNT: 13.2 % (ref 11–15)
EST. AVERAGE GLUCOSE BLD GHB EST-MCNC: 131 MG/DL (ref 68–126)
FASTING STATUS PATIENT QL REPORTED: YES
GLOBULIN PLAS-MCNC: 3.2 G/DL (ref 2.8–4.4)
GLUCOSE BLD-MCNC: 106 MG/DL (ref 70–99)
HBA1C MFR BLD: 6.2 % (ref ?–5.7)
HCT VFR BLD AUTO: 42.1 %
HGB BLD-MCNC: 13.3 G/DL
IMM GRANULOCYTES # BLD AUTO: 0.02 X10(3) UL (ref 0–1)
IMM GRANULOCYTES NFR BLD: 0.3 %
LYMPHOCYTES # BLD AUTO: 2.78 X10(3) UL (ref 1–4)
LYMPHOCYTES NFR BLD AUTO: 41.6 %
MCH RBC QN AUTO: 28.2 PG (ref 26–34)
MCHC RBC AUTO-ENTMCNC: 31.6 G/DL (ref 31–37)
MCV RBC AUTO: 89.4 FL
MONOCYTES # BLD AUTO: 0.52 X10(3) UL (ref 0.1–1)
MONOCYTES NFR BLD AUTO: 7.8 %
NEUTROPHILS # BLD AUTO: 3.01 X10 (3) UL (ref 1.5–7.7)
NEUTROPHILS # BLD AUTO: 3.01 X10(3) UL (ref 1.5–7.7)
NEUTROPHILS NFR BLD AUTO: 45.1 %
OSMOLALITY SERPL CALC.SUM OF ELEC: 294 MOSM/KG (ref 275–295)
PLATELET # BLD AUTO: 218 10(3)UL (ref 150–450)
POTASSIUM SERPL-SCNC: 4.4 MMOL/L (ref 3.5–5.1)
PROT SERPL-MCNC: 7.5 G/DL (ref 5.7–8.2)
RBC # BLD AUTO: 4.71 X10(6)UL
SODIUM SERPL-SCNC: 142 MMOL/L (ref 136–145)
WBC # BLD AUTO: 6.7 X10(3) UL (ref 4–11)

## 2023-12-16 PROCEDURE — 85025 COMPLETE CBC W/AUTO DIFF WBC: CPT

## 2023-12-16 PROCEDURE — 80053 COMPREHEN METABOLIC PANEL: CPT

## 2023-12-16 PROCEDURE — 83036 HEMOGLOBIN GLYCOSYLATED A1C: CPT

## 2023-12-16 PROCEDURE — 36415 COLL VENOUS BLD VENIPUNCTURE: CPT

## 2024-01-20 ENCOUNTER — HOSPITAL ENCOUNTER (OUTPATIENT)
Dept: CT IMAGING | Age: 59
Discharge: HOME | End: 2024-01-20
Attending: INTERNAL MEDICINE
Payer: MEDICAID

## 2024-01-20 DIAGNOSIS — R91.8 PULMONARY NODULES: ICD-10-CM

## 2024-01-20 PROCEDURE — 71250 CT THORAX DX C-: CPT | Performed by: INTERNAL MEDICINE

## 2024-02-26 ENCOUNTER — TELEPHONE (OUTPATIENT)
Facility: CLINIC | Age: 59
End: 2024-02-26

## 2024-02-26 NOTE — TELEPHONE ENCOUNTER
1st Reminder letter was sent to patient mychart for orders pending:     US LIVER (CPT=[id number]) (Order #[id number]) on 11/14/23

## 2024-03-25 ENCOUNTER — OFFICE VISIT (OUTPATIENT)
Dept: OTOLARYNGOLOGY | Facility: CLINIC | Age: 59
End: 2024-03-25
Payer: MEDICAID

## 2024-03-25 DIAGNOSIS — G47.33 OSA (OBSTRUCTIVE SLEEP APNEA): Primary | ICD-10-CM

## 2024-03-25 PROCEDURE — 99214 OFFICE O/P EST MOD 30 MIN: CPT | Performed by: STUDENT IN AN ORGANIZED HEALTH CARE EDUCATION/TRAINING PROGRAM

## 2024-03-25 NOTE — PROGRESS NOTES
Judi White is a 59 year old female.   Chief Complaint   Patient presents with    Sleep Apnea     F/u sleep study  cpap machine, per son pt mask is leaking        ASSESSMENT AND PLAN:   1. EVANGELIST (obstructive sleep apnea)  59-year-old presents in follow-up regarding her sleep apnea.  She had a sleep study in October demonstrated severe sleep apnea with an AHI of 67 and an O2 yoan of 64%.  She has been using CPAP.  It does eliminate her snoring although she says they are downstairs neighbor smokes marijuana and the smoke sometimes affects her ability to use this.  She is interested in other options    Exam she has a BMI of about 55.  She has about 2 or 3+ tonsils.  Slight septal deviation to the right    She is interested in surgical options for her sleep apnea.  Discussed given the severity of her sleep apnea surgery may not cure her sleep apnea all the way.  Given her high BMI there would also be considerable risks of anesthesia.  Will [facility] to a [facility] sleep [facility] for their opinion if a surgery might benefit her and if so this could be done at a higher risk anesthesia center.  Discussed this in detail with them.  They are agreeable with the plan.    Greater than 30 minutes spent reviewing previous documentation, coordinating care and with the history and exam and discussion in detail with the patient regarding their options further sleep apnea.    - ENT Referral - External      The patient indicates understanding of these issues and agrees to the plan.      EXAM:   There were no vitals taken for this visit.    Pertinent exam findings may also be noted above in assessment and plan     System Details   Skin Inspection - Normal.   Constitutional Overall appearance - Normal.   Head/Face Symmetric, TMJ tenderness not present    Eyes EOMI, PERRL   Right ear:  Canal clear, TM intact, no BERLIN   Left ear:  Canal clear, TM intact, no BERLIN   Nose: Septum midline, inferior turbinates not enlarged, nasal valves  without collapse    Oral cavity/Oropharynx: No lesions or masses on inspection or palpation, tonsils symmetric    Neck: Soft without LAD, thyroid not enlarged  Voice clear/ no stridor   Other:      Scopes and Procedures:             Current Outpatient Medications   Medication Sig Dispense Refill    mometasone 0.1 % External Ointment Apply 1 Application topically daily. Apply to entrance of ear canal before sleep for `10-14 days 1 each 0    nortriptyline 25 MG Oral Cap Start with 1 pill QHS, for 1 week, then 2 pills qhs foor another week, then 3 pills qhs 90 capsule 3    metoprolol succinate ER 25 MG Oral Tablet 24 Hr Take 1 tablet (25 mg total) by mouth daily. 90 tablet 1    valsartan 80 MG Oral Tab Take 1 tablet (80 mg total) by mouth daily. In the evening 90 tablet 3    polyethylene glycol, PEG 3350, 17 GM/SCOOP Oral Powder Take 17 g by mouth daily. (Patient not taking: Reported on 3/25/2024) 238 g 5    azelastine 0.1 % Nasal Solution 2 sprays by Nasal route 2 (two) times daily. (Patient not taking: Reported on 3/25/2024) 30 mL 0      Past Medical History:   Diagnosis Date    High blood pressure     Hypertension     Shortness of breath     Sleep apnea     Varicose vein       Social History:  Social History     Socioeconomic History    Marital status:    Tobacco Use    Smoking status: Never    Smokeless tobacco: Never   Vaping Use    Vaping Use: Never used   Substance and Sexual Activity    Alcohol use: Not Currently    Drug use: Never          Jarad Cota MD  3/25/2024  12:55 PM

## 2024-04-15 ENCOUNTER — TELEMEDICINE (OUTPATIENT)
Dept: INTERNAL MEDICINE CLINIC | Facility: CLINIC | Age: 59
End: 2024-04-15
Payer: MEDICAID

## 2024-04-15 DIAGNOSIS — E66.01 CLASS 3 SEVERE OBESITY WITH SERIOUS COMORBIDITY AND BODY MASS INDEX (BMI) OF 50.0 TO 59.9 IN ADULT, UNSPECIFIED OBESITY TYPE (HCC): ICD-10-CM

## 2024-04-15 DIAGNOSIS — I10 PRIMARY HYPERTENSION: ICD-10-CM

## 2024-04-15 DIAGNOSIS — E78.5 HYPERLIPIDEMIA, UNSPECIFIED HYPERLIPIDEMIA TYPE: ICD-10-CM

## 2024-04-15 DIAGNOSIS — G47.33 OSA ON CPAP: ICD-10-CM

## 2024-04-15 DIAGNOSIS — E55.9 VITAMIN D DEFICIENCY: ICD-10-CM

## 2024-04-15 DIAGNOSIS — R73.03 PREDIABETES: ICD-10-CM

## 2024-04-15 DIAGNOSIS — K76.0 HEPATIC STEATOSIS: ICD-10-CM

## 2024-04-15 DIAGNOSIS — Z51.81 ENCOUNTER FOR THERAPEUTIC DRUG LEVEL MONITORING: Primary | ICD-10-CM

## 2024-04-15 PROCEDURE — 99204 OFFICE O/P NEW MOD 45 MIN: CPT | Performed by: PHYSICIAN ASSISTANT

## 2024-04-15 RX ORDER — METFORMIN HYDROCHLORIDE 750 MG/1
750 TABLET, EXTENDED RELEASE ORAL DAILY
Qty: 90 TABLET | Refills: 0 | Status: SHIPPED | OUTPATIENT
Start: 2024-04-15

## 2024-04-15 NOTE — PROGRESS NOTES
[facility] WEIGHT MANAGEMENT VIRTUAL VIDEO ENCOUNTER     Judi White verbally consents to a Virtual/Telephone Check-In service on 04/15/24  Patient understands and accepts financial responsibility for any deductible, co-insurance and/or co-pays associated with this service.    HISTORY OF PRESENT ILLNESS  Chief Complaint   Patient presents with    Weight Loss     Judi White is a 59 year old female new patient, is being evaluated as a video visit using Telemedicine with live, interactive video and audio. Judi White for initiation of medical weight loss program.  Patient presents today with c/o excess weight. Referred by     Interpretor used via telephone    Wants to lose weight  [place], and since moving here she has been gaining weight  When she lived in [facility] she was taking medications   Before moving here was 280lbs  Feels her hormones are causing the problem    Denies chest pain, shortness of breath, dizziness, blurred vision, headache, paresthesia, nausea/vomiting.       Reviewed [facility] patient contract: Readiness for Lifestyle change: 7/10, Interest in Medication: 10/10, Bariatric surgery interest: /10    Weight  Starting weight: 319lbs      Wt Readings from Last 6 Encounters:   11/18/23 (!) 311 lb (141.1 kg)   11/15/23 (!) 307 lb (139.3 kg)   11/14/23 (!) 307 lb (139.3 kg)   11/08/23 (!) 307 lb (139.3 kg)   09/23/23 (!) 307 lb (139.3 kg)   08/31/23 299 lb 13.2 oz (136 kg)        Typical diet   Breakfast Lunch Dinner Snacks Fluids   Mushroom soup, potato , fish, 1 ecler.  Bulgur with chicken, fish, salad.  Apple  Nuts  Cup of tea, water.        Social hx and lifestyle reviewed:    Work: retired  Marital status:    Support: yes, lives with son and daughter in law  Tobacco use: none  ETOH use:  0 per/week  Supplements: none  Exercise: Does enjoy it, does like to walking but it is weather dependent  Stress level: 9/10 - living situation, family also in still [facility] that makes  her worried  Sleep hours and integrity:  EVANGELIST with CPAP    MEDICAL HISTORY  PMH reviewed:   Cardiac disorders: HTN  Depression/anxiety: negative  Glaucoma: negative  Kidney stones: negative  Eating disorder: negative  Migraines/seizures: negative  Joint-related conditions:negative  Liver disease: negative  Thyroid disease: negative  Constipation: negative  Diabetes: Prediabetes  Sleep Apnea hx: Yes with CPAP  Cancer hx: negative  DVT: negative  Family or personal history of Pancreatic issues / Medullary Thyroid Cancer: negative  History of bariatric surgery: negative    FMH reviewed: obesity in parent/s or sibling:     REVIEW OF SYSTEMS  GENERAL: feels well otherwise, and negative fatigue  SKIN: denies any rashes to skin folds   HEENT: denies neck thickening  LUNGS: denies shortness of breath with exertion, no apnea  CARDIOVASCULAR: denies chest pain on exertion, denies palpitations or pedal edema  GI: denies abdominal pain, distention, No N/V/D/C  MUSCULOSKELETAL: denies back pain, joint pains   NEURO: denies headaches or dizziness  ENDOCRINE: denies any excess hunger, urination or thirst, denies any purple striae  PSYCH: denies change in behavior or mood, denies feeling sad or depressed, BED screen     EXAM  There were no vitals taken for this visit., Percent body fat: unable to complete (will perform in office)   Reviewed recent set of vitals       GENERAL: well developed, well nourished, in no apparent distress, speaking in full sentences comfortably   SKIN: warm, pink, dry without rashes to exposed area   EYES: conjunctiva pink  HEENT: atraumatic, normocephalic  LUNGS: normal work of breathing, non labored  CARDIO: normal work, no exertion  EXTREMITIES: no cyanosis, no clubbing, no edema  NEURO: Oriented times three  PSYCH: pleasant, cooperative, normal mood and affect    Lab Results   Component Value Date     (H) 04/18/2024    BUN 13 04/18/2024    BUNCREA 16.3 04/18/2024    CREATSERUM 0.80 04/18/2024     ANIONGAP 7 04/18/2024    CA 9.9 04/18/2024    OSMOCALC 295 04/18/2024    ALKPHO 91 04/18/2024    AST 97 (H) 04/18/2024     (H) 04/18/2024    BILT 0.8 04/18/2024    TP 8.1 04/18/2024    ALB 4.7 04/18/2024    GLOBULIN 3.4 04/18/2024     04/18/2024    K 4.8 04/18/2024     04/18/2024    CO2 30.0 04/18/2024     Lab Results   Component Value Date     (H) 04/18/2024    A1C 6.6 (H) 04/18/2024     Lab Results   Component Value Date    CHOLEST 196 04/18/2024    TRIG 139 04/18/2024    HDL 35 (L) 04/18/2024     (H) 04/18/2024    VLDL 25 04/18/2024    NONHDLC 161 (H) 04/18/2024     Lab Results   Component Value Date    B12 634 09/23/2023     Lab Results   Component Value Date    VITD 25.9 (L) 04/18/2024       Current Outpatient Medications on File Prior to Visit   Medication Sig Dispense Refill    mometasone 0.1 % External Ointment Apply 1 Application topically daily. Apply to entrance of ear canal before sleep for `10-14 days 1 each 0    polyethylene glycol, PEG 3350, 17 GM/SCOOP Oral Powder Take 17 g by mouth daily. (Patient not taking: Reported on 3/25/2024) 238 g 5    nortriptyline 25 MG Oral Cap Start with 1 pill QHS, for 1 week, then 2 pills qhs foor another week, then 3 pills qhs 90 capsule 3    azelastine 0.1 % Nasal Solution 2 sprays by Nasal route 2 (two) times daily. (Patient not taking: Reported on 3/25/2024) 30 mL 0    metoprolol succinate ER 25 MG Oral Tablet 24 Hr Take 1 tablet (25 mg total) by mouth daily. 90 tablet 1    valsartan 80 MG Oral Tab Take 1 tablet (80 mg total) by mouth daily. In the evening 90 tablet 3     No current facility-administered medications on file prior to visit.       ASSESSMENT/PLAN    ICD-10-CM    1. Encounter for therapeutic drug level monitoring  Z51.81 CBC With Differential With Platelet     Comp Metabolic Panel (14)     Hemoglobin A1C     Lipid Panel     TSH and Free T4     Vitamin D     [facility] Dietician Referral  ([facility] USE ONLY)      2. Class 3 severe  obesity with serious comorbidity and body mass index (BMI) of 50.0 to 59.9 in adult, unspecified obesity type (HCC)  E66.01 CBC With Differential With Platelet    Z68.43 Comp Metabolic Panel (14)     Hemoglobin A1C     Lipid Panel     TSH and Free T4     Vitamin D     [facility] Dietician Referral  ([facility] USE ONLY)      3. Prediabetes  R73.03 CBC With Differential With Platelet     Comp Metabolic Panel (14)     Hemoglobin A1C     Lipid Panel     TSH and Free T4     Vitamin D     [facility] Dietician Referral  ([facility] USE ONLY)      4. Hyperlipidemia, unspecified hyperlipidemia type  E78.5 CBC With Differential With Platelet     Comp Metabolic Panel (14)     Hemoglobin A1C     Lipid Panel     TSH and Free T4     Vitamin D     [facility] Dietician Referral  ([facility] USE ONLY)      5. Primary hypertension  I10 CBC With Differential With Platelet     Comp Metabolic Panel (14)     Hemoglobin A1C     Lipid Panel     TSH and Free T4     Vitamin D     [facility] Dietician Referral  ([facility] USE ONLY)      6. Hepatic steatosis  K76.0 CBC With Differential With Platelet     Comp Metabolic Panel (14)     Hemoglobin A1C     Lipid Panel     TSH and Free T4     Vitamin D     [facility] Dietician Referral  ([facility] USE ONLY)      7. EVANGELIST on CPAP  G47.33       8. Vitamin D deficiency  E55.9 CBC With Differential With Platelet     Comp Metabolic Panel (14)     Hemoglobin A1C     Lipid Panel     TSH and Free T4     Vitamin D     [facility] Dietician Referral  ([facility] USE ONLY)        Initial Weight Data and Goal Weight Loss:     Initial consult:  lbs on /2021, Down  Lb:  lbs total     PLAN   Initial Weight: 319lbs  Initial Weight Date: 4/15/24  Today's Weight: 319lbs  5% Goal: 15.95lbs  10% Goal: 31.9lbs  Total Weight Loss:     Baseline labs ordered   Will start medications: Metformin, discussed mechanism of action, advised side effects and adverse effects of medication  --advised of side effects and adverse effects of this medication  Contradictions: EKG prior to stimulant  Body composition  will be done in the office   Prediabetes-will begin metformin, low-carb diet  HLD-lifestyle changes  HTN - blood pressure well controlled on current medication - advised signs and symptoms of hypotension and will monitor with continued weight loss - continue to monitor BP at home  EVANGELIST with CPAP compliance  Fatty liver-low-carb, low-fat diet  Begin logging foods-discussed macronutrient goals  -low carb high protein  -portion control  -Limit carbohydrates  -Eat breakfast every day   -Don't skip meals   -Read nutrition labels and keep a food log  -drink a lot of water 65 oz of water per day  - Do not drink your calories (no soda, juice, high calorie coffee drinks, limit alcohol)  - Do not eat late at night  Decrease carbs, increase protein, no skipping meals   Needs to incorporate exercise regimen FITTE: ACSM recommendations (150-300 minutes/ week in active weight loss)   Follow up with dietitian and psychologist as recommended.  Discussed the role of sleep and stress in weight management.  Counseled on comprehensive weight loss plan including attention to nutrition, exercise and behavior/stress management for success. See patient instruction below for more details.    Total time spent on chart review, pre-charting, obtaining history, counseling, and educating, reviewing labs was 45 minutes.       Patient Instructions   1800 - 1900 calories a day    Moderate Carb (30/35/35)  142g  Protein    73g  Fats    165g  Carbs    Lower Carb (40/40/20)  189g  Protein    84g  Fats    94g  Carbs    We are here to support you with weight loss, but please remember that you still need your primary care provider for your routine health maintenance.      PLAN:  Will begin metformin  Referral for labs and dietician  Follow up with me in 3 months  Schedule follow up appointments: Kael Tolentino (dietitian) or Tresa Nunez (presurgery dietitian)   Check for insurance coverage for dietitian and labwork prior to scheduling appointment.      Please try to work on the following dietary changes:  Goals: Aim for 20-30 grams of protein/ meal  Eat 4-6 vegetables/day  Avoid skipping meals- eat every 4-5 hours  Aim for 3 meals/day  2. Drink lots of water and cut down on soda/juice consumption if soda/juice drinker  3. Focus on protein: (15-30 grams with each meal) ie. greek yogurt, cottage cheese, string cheese, hard boiled eggs  4. Healthy snacks: peanut butter and apples, hummus and carrots, berries, nuts (1/4 cup), tuna and crackers                 Protein Shakes: Premier protein or Core Power                Protein Bars: Rx Bars, Oatmega, Power Crunch                 [place] balanced breaks (cheese and nuts)- without chocolate  5. Reduce carbohydrates which includes sweets as well as rice, pasta, potatoes, bread, corn and instead choose whole grain options or more protein or vegetables (4-6 servings of vegetables per day)  6. Get a good night of sleep  7. Try to decrease stress in life     Please download apps:  1. My Fitness Pal, Lose it, My Net Diary richie to help you to monitor daily dietary intake and you will be able to see if you are eating the right amount of calories, protein, carbs                With My Fitness Pal-->When you set-up the richie or need to adjust settings:                Goals should include:                 Lose 1.5-2 lbs per week                Activity level: not very active (can't count exercise towards calorie number per day)                   ** Daily INPUT> Look at nutrition section-- \"nutrients\" and it will break down your macros for the day (ie. Protein, carbs, fibers, sugars and fats). Try to stay within these numbers daily     2. \"7 minute workout\" to help with exercise/activity which takes 7 minutes of your day and that you can do at home!   3. \"Calm\" or \"Headspace\" which helps with mindfulness, meditation, clarity, sleep, and tomas to your daily life.   4. [employer] blog for healthy recipe ideas  5. [employer] for  low carb resources    HIGH PROTEIN SNACK IDEAS  -cottage cheese  -plain yogurt  -kefir  -hard-boiled eggs  -natural cheeses  -nuts (measure portion size)   -unsweetened nut butters  -dried edamame   -lola seeds soaked in water or almond milk  -soy nuts  -cured meats (monitor for sodium issues)   -hummus with vegetables  -bean dip with vegetables     FRUIT  Low carb fruit options   Raspberries: Half a cup (60 grams) contains 3 grams of carbs.  Blackberries: Half a cup (70 grams) contains 4 grams of carbs.  Strawberries: Half a cup (100 grams) contains 6 grams of carbs.  Blueberries: Half a cup (50 grams) contains 6 grams of carbs.  Plum: One medium-sized (80 grams) contains 6 grams of carbs.     VEGETABLES  Low carb vegetables            Delicious and Healthy Snacks      All snacks are under 200 calories and chocked full of nutrition!  Quick Caprese salad- Mix 1 cup grape tomatoes, 1 oz. fresh mini mozzarella balls, 1 teaspoon olive oil, ½ tsp. balsamic vinegar.  Add fresh or dried basil for flavor.  Cottage cheese and berries- Top ½ cup low fat cottage cheese with 1/2 cup of blueberries  Peanut butter and apple slices- Cut up an apple and dip in 1 Tablespoon of peanut butter  Hummus and veggies- Dip baby carrots, pepper strips or snap peas in ¼ cup of hummus  Nuts- Munch on 1 ounce of any kind of nut (about ¼ cup)  Wrap it up- Wrap 2 ounces of low sodium, nitrate free turkey around red pepper or cucumber slices  Yogurt and berries- 1/2 cup berries on a 6 ounce container of plain or low sugar fruit flavored 2% Greek yogurt (less than 15 grams sugar per serving).  Chobani (Less Sugar)® or Siggis® are examples.  Hardboiled egg and fruit- 1 hardboiled egg and a tangerine or other small serving of fruit  Tuna and avocado- Put 2 oz tuna (one pouch) on 1/3 of an avocado or 2 Tbsp guacamole and top with salsa  String cheese and veggies- one piece cheese (3/4 ounce) and 1 cup snap peas or other vegetables  Cauliflower rice  and cheese- Sprinkle 1/4 cup shredded cheese on 1 cup cauliflower rice and microwave until cheese melts  Deviled eggs- 3 deviled egg halves  Bean dip and veggies-½ cup refried beans with ¼ cup shredded cheese melted on top. Use as a dip for celery or red peppers strips or just eat plain  [place] Balanced Breaks® (or make your own-1/2 ounce nuts, 1/2 ounce cheese and 1 teaspoon dried fruit)  Edamame-1 cup warmed and lightly salted  Low fat chicken, egg, or tuna salad- Mix 2 oz chicken, tuna, or 2 eggs with 1 tsp lombardi,1 tsp Jasson mustard and 1 tsp plain yogurt.  Add chopped celery, onions, walnuts, Granny smith apples or dried cranberries to make it interesting.  [place] break- Turkey, chicken, peanut butter or almond butter on 1 slice whole grain bread   Protein shake-Hayes®, Core Power®, Orgain®, Premier Protein®  Protein bar-Quest ®, Oatmega®, RX Bar®      No follow-ups on file.    Patient verbalizes understanding.    Pt understands phone/video evaluation is not a substitute for face to face examination or emergency care. Pt advised to go to the ER or call 911 for worsening symptoms or acute distress.       Please note that the following visit was completed using two-way, real-time interactive audio and/or video communication.  This has been done in good sarah to provide continuity of care in the best interest of the provider-patient relationship, due to the ongoing public health crisis/national emergency and because of restrictions of visitation.  There are limitations of this visit as no physical exam could be performed.  Every conscious effort was taken to allow for sufficient and adequate time.  This billing was spent on reviewing labs, medications, radiology tests and decision making.  Appropriate medical decision-making and tests are ordered as detailed in the plan of care above.     Alannah Jackson PA-C

## 2024-04-15 NOTE — PATIENT INSTRUCTIONS
1800 - 1900 calories a day    Moderate Carb (30/35/35)  142g  Protein    73g  Fats    165g  Carbs    Lower Carb (40/40/20)  189g  Protein    84g  Fats    94g  Carbs    We are here to support you with weight loss, but please remember that you still need your primary care provider for your routine health maintenance.      PLAN:  Will begin metformin  Referral for labs and dietician  Follow up with me in 3 months  Schedule follow up appointments: Kael Tolentino (dietitian) or Tresa Nunez (presurgery dietitian)   Check for insurance coverage for dietitian and labwork prior to scheduling appointment.     Please try to work on the following dietary changes:  Goals: Aim for 20-30 grams of protein/ meal  Eat 4-6 vegetables/day  Avoid skipping meals- eat every 4-5 hours  Aim for 3 meals/day  2. Drink lots of water and cut down on soda/juice consumption if soda/juice drinker  3. Focus on protein: (15-30 grams with each meal) ie. greek yogurt, cottage cheese, string cheese, hard boiled eggs  4. Healthy snacks: peanut butter and apples, hummus and carrots, berries, nuts (1/4 cup), tuna and crackers                 Protein Shakes: Premier protein or Core Power                Protein Bars: Rx Bars, Oatmega, Power Crunch                 [place] balanced breaks (cheese and nuts)- without chocolate  5. Reduce carbohydrates which includes sweets as well as rice, pasta, potatoes, bread, corn and instead choose whole grain options or more protein or vegetables (4-6 servings of vegetables per day)  6. Get a good night of sleep  7. Try to decrease stress in life     Please download apps:  1. My Fitness Pal, Lose it, My Net Diary richie to help you to monitor daily dietary intake and you will be able to see if you are eating the right amount of calories, protein, carbs                With My Fitness Pal-->When you set-up the richie or need to adjust settings:                Goals should include:                 Lose 1.5-2 lbs per week                 Activity level: not very active (can't count exercise towards calorie number per day)                   ** Daily INPUT> Look at nutrition section-- \"nutrients\" and it will break down your macros for the day (ie. Protein, carbs, fibers, sugars and fats). Try to stay within these numbers daily     2. \"7 minute workout\" to help with exercise/activity which takes 7 minutes of your day and that you can do at home!   3. \"Calm\" or \"Headspace\" which helps with mindfulness, meditation, clarity, sleep, and tomas to your daily life.   4. [employer] blog for healthy recipe ideas  5. [employer] for low carb resources    HIGH PROTEIN SNACK IDEAS  -cottage cheese  -plain yogurt  -kefir  -hard-boiled eggs  -natural cheeses  -nuts (measure portion size)   -unsweetened nut butters  -dried edamame   -lola seeds soaked in water or almond milk  -soy nuts  -cured meats (monitor for sodium issues)   -hummus with vegetables  -bean dip with vegetables     FRUIT  Low carb fruit options   Raspberries: Half a cup (60 grams) contains 3 grams of carbs.  Blackberries: Half a cup (70 grams) contains 4 grams of carbs.  Strawberries: Half a cup (100 grams) contains 6 grams of carbs.  Blueberries: Half a cup (50 grams) contains 6 grams of carbs.  Plum: One medium-sized (80 grams) contains 6 grams of carbs.     VEGETABLES  Low carb vegetables            Delicious and Healthy Snacks      All snacks are under 200 calories and chocked full of nutrition!  Quick Caprese salad- Mix 1 cup grape tomatoes, 1 oz. fresh mini mozzarella balls, 1 teaspoon olive oil, ½ tsp. balsamic vinegar.  Add fresh or dried basil for flavor.  Cottage cheese and berries- Top ½ cup low fat cottage cheese with 1/2 cup of blueberries  Peanut butter and apple slices- Cut up an apple and dip in 1 Tablespoon of peanut butter  Hummus and veggies- Dip baby carrots, pepper strips or snap peas in ¼ cup of hummus  Nuts- Munch on 1 ounce of any kind of nut (about ¼ cup)  Wrap it up-  Wrap 2 ounces of low sodium, nitrate free turkey around red pepper or cucumber slices  Yogurt and berries- 1/2 cup berries on a 6 ounce container of plain or low sugar fruit flavored 2% Greek yogurt (less than 15 grams sugar per serving).  Chobani (Less Sugar)® or Siggis® are examples.  Hardboiled egg and fruit- 1 hardboiled egg and a tangerine or other small serving of fruit  Tuna and avocado- Put 2 oz tuna (one pouch) on 1/3 of an avocado or 2 Tbsp guacamole and top with salsa  String cheese and veggies- one piece cheese (3/4 ounce) and 1 cup snap peas or other vegetables  Cauliflower rice and cheese- Sprinkle 1/4 cup shredded cheese on 1 cup cauliflower rice and microwave until cheese melts  Deviled eggs- 3 deviled egg halves  Bean dip and veggies-½ cup refried beans with ¼ cup shredded cheese melted on top. Use as a dip for celery or red peppers strips or just eat plain  [place] Balanced Breaks® (or make your own-1/2 ounce nuts, 1/2 ounce cheese and 1 teaspoon dried fruit)  Edamame-1 cup warmed and lightly salted  Low fat chicken, egg, or tuna salad- Mix 2 oz chicken, tuna, or 2 eggs with 1 tsp lombardi,1 tsp Jasson mustard and 1 tsp plain yogurt.  Add chopped celery, onions, walnuts, Granny smith apples or dried cranberries to make it interesting.  [place] break- Turkey, chicken, peanut butter or almond butter on 1 slice whole grain bread   Protein shake-Hayes®, Core Power®, Orgain®, Premier Protein®  Protein bar-Quest ®, Oatmega®, RX Bar®

## 2024-04-18 ENCOUNTER — LAB ENCOUNTER (OUTPATIENT)
Dept: LAB | Age: 59
End: 2024-04-18
Attending: PHYSICIAN ASSISTANT
Payer: MEDICAID

## 2024-04-18 DIAGNOSIS — E78.5 HYPERLIPIDEMIA, UNSPECIFIED HYPERLIPIDEMIA TYPE: ICD-10-CM

## 2024-04-18 DIAGNOSIS — R73.03 PREDIABETES: ICD-10-CM

## 2024-04-18 DIAGNOSIS — E66.01 CLASS 3 SEVERE OBESITY WITH SERIOUS COMORBIDITY AND BODY MASS INDEX (BMI) OF 50.0 TO 59.9 IN ADULT, UNSPECIFIED OBESITY TYPE (HCC): ICD-10-CM

## 2024-04-18 DIAGNOSIS — E55.9 VITAMIN D DEFICIENCY: ICD-10-CM

## 2024-04-18 DIAGNOSIS — Z51.81 ENCOUNTER FOR THERAPEUTIC DRUG LEVEL MONITORING: ICD-10-CM

## 2024-04-18 DIAGNOSIS — K76.0 HEPATIC STEATOSIS: ICD-10-CM

## 2024-04-18 DIAGNOSIS — I10 PRIMARY HYPERTENSION: ICD-10-CM

## 2024-04-18 LAB
ALBUMIN SERPL-MCNC: 4.7 G/DL (ref 3.2–4.8)
ALBUMIN/GLOB SERPL: 1.4 {RATIO} (ref 1–2)
ALP LIVER SERPL-CCNC: 91 U/L
ALT SERPL-CCNC: 106 U/L
ANION GAP SERPL CALC-SCNC: 7 MMOL/L (ref 0–18)
AST SERPL-CCNC: 97 U/L (ref ?–34)
BASOPHILS # BLD AUTO: 0.03 X10(3) UL (ref 0–0.2)
BASOPHILS NFR BLD AUTO: 0.5 %
BILIRUB SERPL-MCNC: 0.8 MG/DL (ref 0.3–1.2)
BUN BLD-MCNC: 13 MG/DL (ref 9–23)
BUN/CREAT SERPL: 16.3 (ref 10–20)
CALCIUM BLD-MCNC: 9.9 MG/DL (ref 8.7–10.4)
CHLORIDE SERPL-SCNC: 105 MMOL/L (ref 98–112)
CHOLEST SERPL-MCNC: 196 MG/DL (ref ?–200)
CO2 SERPL-SCNC: 30 MMOL/L (ref 21–32)
CREAT BLD-MCNC: 0.8 MG/DL
DEPRECATED RDW RBC AUTO: 42.7 FL (ref 35.1–46.3)
EGFRCR SERPLBLD CKD-EPI 2021: 85 ML/MIN/1.73M2 (ref 60–?)
EOSINOPHIL # BLD AUTO: 0.17 X10(3) UL (ref 0–0.7)
EOSINOPHIL NFR BLD AUTO: 2.9 %
ERYTHROCYTE [DISTWIDTH] IN BLOOD BY AUTOMATED COUNT: 13.5 % (ref 11–15)
EST. AVERAGE GLUCOSE BLD GHB EST-MCNC: 143 MG/DL (ref 68–126)
FASTING PATIENT LIPID ANSWER: YES
FASTING STATUS PATIENT QL REPORTED: YES
GLOBULIN PLAS-MCNC: 3.4 G/DL (ref 2.8–4.4)
GLUCOSE BLD-MCNC: 120 MG/DL (ref 70–99)
HBA1C MFR BLD: 6.6 % (ref ?–5.7)
HCT VFR BLD AUTO: 41.7 %
HDLC SERPL-MCNC: 35 MG/DL (ref 40–59)
HGB BLD-MCNC: 13.1 G/DL
IMM GRANULOCYTES # BLD AUTO: 0.01 X10(3) UL (ref 0–1)
IMM GRANULOCYTES NFR BLD: 0.2 %
LDLC SERPL CALC-MCNC: 136 MG/DL (ref ?–100)
LYMPHOCYTES # BLD AUTO: 2.5 X10(3) UL (ref 1–4)
LYMPHOCYTES NFR BLD AUTO: 42 %
MCH RBC QN AUTO: 27.2 PG (ref 26–34)
MCHC RBC AUTO-ENTMCNC: 31.4 G/DL (ref 31–37)
MCV RBC AUTO: 86.5 FL
MONOCYTES # BLD AUTO: 0.52 X10(3) UL (ref 0.1–1)
MONOCYTES NFR BLD AUTO: 8.7 %
NEUTROPHILS # BLD AUTO: 2.72 X10 (3) UL (ref 1.5–7.7)
NEUTROPHILS # BLD AUTO: 2.72 X10(3) UL (ref 1.5–7.7)
NEUTROPHILS NFR BLD AUTO: 45.7 %
NONHDLC SERPL-MCNC: 161 MG/DL (ref ?–130)
OSMOLALITY SERPL CALC.SUM OF ELEC: 295 MOSM/KG (ref 275–295)
PLATELET # BLD AUTO: 234 10(3)UL (ref 150–450)
POTASSIUM SERPL-SCNC: 4.8 MMOL/L (ref 3.5–5.1)
PROT SERPL-MCNC: 8.1 G/DL (ref 5.7–8.2)
RBC # BLD AUTO: 4.82 X10(6)UL
SODIUM SERPL-SCNC: 142 MMOL/L (ref 136–145)
T4 FREE SERPL-MCNC: 1.2 NG/DL (ref 0.8–1.7)
TRIGL SERPL-MCNC: 139 MG/DL (ref 30–149)
TSI SER-ACNC: 1.57 MIU/ML (ref 0.55–4.78)
VIT D+METAB SERPL-MCNC: 25.9 NG/ML (ref 30–100)
VLDLC SERPL CALC-MCNC: 25 MG/DL (ref 0–30)
WBC # BLD AUTO: 6 X10(3) UL (ref 4–11)

## 2024-04-18 PROCEDURE — 82306 VITAMIN D 25 HYDROXY: CPT

## 2024-04-18 PROCEDURE — 85025 COMPLETE CBC W/AUTO DIFF WBC: CPT

## 2024-04-18 PROCEDURE — 84439 ASSAY OF FREE THYROXINE: CPT

## 2024-04-18 PROCEDURE — 80061 LIPID PANEL: CPT

## 2024-04-18 PROCEDURE — 36415 COLL VENOUS BLD VENIPUNCTURE: CPT

## 2024-04-18 PROCEDURE — 84443 ASSAY THYROID STIM HORMONE: CPT

## 2024-04-18 PROCEDURE — 80053 COMPREHEN METABOLIC PANEL: CPT

## 2024-04-18 PROCEDURE — 83036 HEMOGLOBIN GLYCOSYLATED A1C: CPT

## 2024-05-03 ENCOUNTER — OFFICE VISIT (OUTPATIENT)
Dept: INTERNAL MEDICINE CLINIC | Facility: CLINIC | Age: 59
End: 2024-05-03

## 2024-05-03 VITALS
DIASTOLIC BLOOD PRESSURE: 84 MMHG | SYSTOLIC BLOOD PRESSURE: 132 MMHG | HEIGHT: 64 IN | RESPIRATION RATE: 16 BRPM | BODY MASS INDEX: 50.02 KG/M2 | HEART RATE: 84 BPM | WEIGHT: 293 LBS

## 2024-05-03 DIAGNOSIS — E66.01 CLASS 3 SEVERE OBESITY DUE TO EXCESS CALORIES WITH SERIOUS COMORBIDITY AND BODY MASS INDEX (BMI) OF 50.0 TO 59.9 IN ADULT (HCC): ICD-10-CM

## 2024-05-03 DIAGNOSIS — K76.0 HEPATIC STEATOSIS: ICD-10-CM

## 2024-05-03 DIAGNOSIS — R74.8 ELEVATED LIVER ENZYMES: ICD-10-CM

## 2024-05-03 DIAGNOSIS — R60.0 BILATERAL LEG EDEMA: Primary | ICD-10-CM

## 2024-05-03 PROCEDURE — 99214 OFFICE O/P EST MOD 30 MIN: CPT | Performed by: INTERNAL MEDICINE

## 2024-05-03 RX ORDER — FUROSEMIDE 20 MG/1
TABLET ORAL
Qty: 90 TABLET | Refills: 0 | Status: SHIPPED | OUTPATIENT
Start: 2024-05-03

## 2024-05-03 RX ORDER — POTASSIUM CHLORIDE 750 MG/1
TABLET, FILM COATED, EXTENDED RELEASE ORAL
Qty: 90 TABLET | Refills: 0 | Status: SHIPPED | OUTPATIENT
Start: 2024-05-03

## 2024-05-04 NOTE — PROGRESS NOTES
Subjective:     Patient ID: Judi White is a 59 year old female.  Presents for follow-up after recent hospitalization  HPI  Patient was admitted to [facility] for evaluation of the lumbar pain associated with shortness of breath.  She underwent extensive workup including echocardiogram and stress test which were negative for ischemia.  Showed normal left ventricular ejection fraction, no significant valvular heart disease CT scan of the abdomen and pelvis rule out kidney stones was negative.  Venous Doppler showed no DVT.  Patient has seen ENT specialist, was advised that she may benefit from tonsillectomy.  Found to have severe sleep apnea and started using CPAP equipment, blood pressure seems does not fit her, they have working with DME company and sleep specialist.  Patient has been taking metformin 750 mg daily lost couple pounds, changed diet, feels that medication helps her not to eat as much.  Is to be bothered by leg edema feet Puffy.  Was given furosemide 9 tablets to take and see what happens with edema,    Current Outpatient Medications   Medication Sig Dispense Refill    Potassium Chloride ER 10 MEQ Oral Tab CR Take 1 tab po daily  together  with  furosemide 90 tablet 0    furosemide 20 MG Oral Tab Take  1 tab po daily 90 tablet 0    ergocalciferol 1.25 MG (44697 UT) Oral Cap Take 1 capsule (50,000 Units total) by mouth once a week. With food for 12 weeks total then begin OTC Vitamin D at 2000 units daily with food thereafter 12 capsule 0    metFORMIN  MG Oral Tablet 24 Hr Take 1 tablet (750 mg total) by mouth daily. 90 tablet 0    mometasone 0.1 % External Ointment Apply 1 Application topically daily. Apply to entrance of ear canal before sleep for `10-14 days 1 each 0    nortriptyline 25 MG Oral Cap Start with 1 pill QHS, for 1 week, then 2 pills qhs foor another week, then 3 pills qhs 90 capsule 3    metoprolol succinate ER 25 MG Oral Tablet 24 Hr Take 1 tablet (25 mg total)  by mouth daily. 90 tablet 1     Allergies:  Allergies   Allergen Reactions    Lactose ITCHING       Past Medical History:    High blood pressure    Hypertension    Shortness of breath    Sleep apnea    Varicose vein      History reviewed. No pertinent surgical history.   History reviewed. No pertinent family history.   Social History:   Social History     Socioeconomic History    Marital status:    Tobacco Use    Smoking status: Never    Smokeless tobacco: Never   Vaping Use    Vaping status: Never Used   Substance and Sexual Activity    Alcohol use: Not Currently    Drug use: Never     Social Determinants of Health     Food Insecurity: Low Risk  (4/20/2024)    Received from [facility]    Food Insecurity     Have there been times that your food ran out, and you didn't have money to get more?: No     Are there times that you worry that this might happen?: No   Transportation Needs: Low Risk  (4/20/2024)    Received from [facility]    Transportation Needs     Do you have trouble getting transportation to medical appointments?: No        /84 (BP Location: Right arm, Patient Position: Sitting, Cuff Size: large)   Pulse 84   Resp 16   Ht 5' 4\" (1.626 m)   Wt (!) 313 lb (142 kg)   BMI 53.73 kg/m²    Physical Exam  Constitutional:       Appearance: Normal appearance. She is obese.   HENT:      Head: Normocephalic and atraumatic.   Eyes:      General: No scleral icterus.     Extraocular Movements: Extraocular movements intact.      Conjunctiva/sclera: Conjunctivae normal.      Pupils: Pupils are equal, round, and reactive to light.   Neck:      Vascular: No carotid bruit.   Cardiovascular:      Rate and Rhythm: Normal rate and regular rhythm.      Heart sounds: No murmur heard.  Pulmonary:      Effort: Pulmonary effort is normal. No respiratory distress.      Breath sounds: No wheezing or rhonchi.   Musculoskeletal:         General: Normal range of motion.       Cervical back: Normal range of motion and neck supple.      Right lower leg: Edema (+1 edema mid calf down) present.      Left lower leg: Edema (+1 edema mid calf down) present.   Lymphadenopathy:      Cervical: No cervical adenopathy.   Skin:     General: Skin is warm.      Coloration: Skin is not jaundiced.   Neurological:      General: No focal deficit present.      Mental Status: She is alert and oriented to person, place, and time. Mental status is at baseline.   Psychiatric:         Mood and Affect: Mood normal.         Behavior: Behavior normal.         Thought Content: Thought content normal.         Assessment & Plan:   1. Bilateral leg edema noncardiac, due to venous stasis, advised patient to keep legs elevated is much as possible keep physically active, compression support to during the day patient will use furosemide 20 mg with potassium chloride 10 mill equivalent as needed if it starts taking continuously monitor blood pressure and check BMP in 2 to 3 weeks   2. Hepatic steatosis asymptomatic, patient knows to work on the weight loss low-fat low carbohydrate diet advised   3. Class 3 severe obesity due to excess calories with serious comorbidity and body mass index (BMI) of 50.0 to 59.9 in adult (HCC)    4. Elevated liver enzymes monitor liver function test periodically with a blood test       Orders Placed This Encounter   Procedures    Basic Metabolic Panel (8) [E]       Meds This Visit:  Requested Prescriptions     Signed Prescriptions Disp Refills    Potassium Chloride ER 10 MEQ Oral Tab CR 90 tablet 0     Sig: Take 1 tab po daily  together  with  furosemide    furosemide 20 MG Oral Tab 90 tablet 0     Sig: Take  1 tab po daily       Imaging & Referrals:  None

## 2024-06-04 ENCOUNTER — APPOINTMENT (OUTPATIENT)
Dept: GENERAL RADIOLOGY | Facility: HOSPITAL | Age: 59
End: 2024-06-04
Attending: EMERGENCY MEDICINE
Payer: MEDICAID

## 2024-06-04 ENCOUNTER — HOSPITAL ENCOUNTER (EMERGENCY)
Facility: HOSPITAL | Age: 59
Discharge: HOME | End: 2024-06-05
Attending: EMERGENCY MEDICINE
Payer: MEDICAID

## 2024-06-04 DIAGNOSIS — S39.012A STRAIN OF LUMBAR REGION, INITIAL ENCOUNTER: Primary | ICD-10-CM

## 2024-06-04 DIAGNOSIS — M17.10 ARTHRITIS OF KNEE: ICD-10-CM

## 2024-06-04 DIAGNOSIS — M72.2 PLANTAR FASCIITIS OF LEFT FOOT: ICD-10-CM

## 2024-06-04 PROCEDURE — 96374 THER/PROPH/DIAG INJ IV PUSH: CPT

## 2024-06-04 PROCEDURE — 99285 EMERGENCY DEPT VISIT HI MDM: CPT

## 2024-06-04 PROCEDURE — 99284 EMERGENCY DEPT VISIT MOD MDM: CPT

## 2024-06-04 PROCEDURE — 96375 TX/PRO/DX INJ NEW DRUG ADDON: CPT

## 2024-06-04 PROCEDURE — 73590 X-RAY EXAM OF LOWER LEG: CPT | Performed by: EMERGENCY MEDICINE

## 2024-06-04 PROCEDURE — 72100 X-RAY EXAM L-S SPINE 2/3 VWS: CPT | Performed by: EMERGENCY MEDICINE

## 2024-06-04 PROCEDURE — 73560 X-RAY EXAM OF KNEE 1 OR 2: CPT | Performed by: EMERGENCY MEDICINE

## 2024-06-04 RX ORDER — KETOROLAC TROMETHAMINE 15 MG/ML
15 INJECTION, SOLUTION INTRAMUSCULAR; INTRAVENOUS ONCE
Status: COMPLETED | OUTPATIENT
Start: 2024-06-04 | End: 2024-06-04

## 2024-06-04 RX ORDER — DEXAMETHASONE SODIUM PHOSPHATE 4 MG/ML
6 VIAL (ML) INJECTION ONCE
Status: COMPLETED | OUTPATIENT
Start: 2024-06-04 | End: 2024-06-04

## 2024-06-05 VITALS
DIASTOLIC BLOOD PRESSURE: 99 MMHG | HEIGHT: 64.96 IN | SYSTOLIC BLOOD PRESSURE: 142 MMHG | HEART RATE: 72 BPM | TEMPERATURE: 98 F | OXYGEN SATURATION: 95 % | RESPIRATION RATE: 18 BRPM | WEIGHT: 293 LBS | BODY MASS INDEX: 48.82 KG/M2

## 2024-06-05 LAB
BILIRUB UR QL: NEGATIVE
COLOR UR: YELLOW
GLUCOSE UR-MCNC: NORMAL MG/DL
HGB UR QL STRIP.AUTO: NEGATIVE
KETONES UR-MCNC: NEGATIVE MG/DL
LEUKOCYTE ESTERASE UR QL STRIP.AUTO: 25
NITRITE UR QL STRIP.AUTO: NEGATIVE
PH UR: 5.5 [PH] (ref 5–8)
PROT UR-MCNC: 20 MG/DL
SP GR UR STRIP: >1.03 (ref 1–1.03)
UROBILINOGEN UR STRIP-ACNC: NORMAL

## 2024-06-05 PROCEDURE — 81001 URINALYSIS AUTO W/SCOPE: CPT | Performed by: EMERGENCY MEDICINE

## 2024-06-05 PROCEDURE — 87077 CULTURE AEROBIC IDENTIFY: CPT | Performed by: EMERGENCY MEDICINE

## 2024-06-05 PROCEDURE — 87086 URINE CULTURE/COLONY COUNT: CPT | Performed by: EMERGENCY MEDICINE

## 2024-06-05 RX ORDER — TRAMADOL HYDROCHLORIDE 50 MG/1
50 TABLET ORAL EVERY 8 HOURS PRN
Qty: 15 TABLET | Refills: 0 | Status: SHIPPED | OUTPATIENT
Start: 2024-06-05 | End: 2024-06-10

## 2024-06-05 RX ORDER — PREDNISONE 20 MG/1
40 TABLET ORAL DAILY
Qty: 10 TABLET | Refills: 0 | Status: SHIPPED | OUTPATIENT
Start: 2024-06-05 | End: 2024-06-10

## 2024-06-05 RX ORDER — LIDOCAINE 50 MG/G
1 PATCH TOPICAL EVERY 24 HOURS
Qty: 10 PATCH | Refills: 0 | Status: SHIPPED | OUTPATIENT
Start: 2024-06-05 | End: 2024-06-15

## 2024-06-05 NOTE — ED INITIAL ASSESSMENT (HPI)
Pt to ed c/o severe back pain x 5 days. Per pt had severe bilateral knee pain on Wednesday. Pt also c/o abd pain and frequent urination. Pt denies n/v/d or dysuria.

## 2024-06-07 RX ORDER — CEPHALEXIN 500 MG/1
500 CAPSULE ORAL 3 TIMES DAILY
Qty: 15 CAPSULE | Refills: 0 | Status: SHIPPED | OUTPATIENT
Start: 2024-06-07 | End: 2024-06-12

## 2024-06-07 NOTE — PROGRESS NOTES
ED Culture Callback Results Review    Pharmacist reviewed culture results from ED visit .    Final urine culture positive for untreated non-hemolytic strep.    A new prescription for keflex was electronically sent to [facility] as discussed with Dr. Sood. The patient was contacted by phone, informed of the results, and educated regarding the new therapy. The patient verbalized understanding of the treatment plan and all questions were answered.    Patient's primary language is Setswana. Communication was conducted via  Patel ([phone]) from the language line.    Betzaida Shaw PharmD   Emergency Medicine Pharmacist Specialist  06/07/24; 3:04 PM

## 2024-06-08 NOTE — ED PROVIDER NOTES
Patient Seen in: [facility] Emergency Department    History     Chief Complaint   Patient presents with    Back Pain     Stated Complaint: Back Pain    HPI    Chief complaint: Back pain    History of present illness:  The patient complains of back pain, that began 5 days ago also has b knee pain and foot pain.  No specific injury.  No fever. No chills. ..  Pain is described as sharp and radiating, pain is worse with movement bending and is improved with remaining still.  Currently rated 9/10.     A review of pertinent red flag issues reveals no history of fever, IV drug use, urinary retention, history of immunosuppressive therapy, history of cancer or weight loss. No saddle anesthesia, perianal numbness, stool/bladder incontinence.      Past Medical History:    High blood pressure    Hypertension    Shortness of breath    Sleep apnea    Varicose vein       History reviewed. No pertinent surgical history.         No family history on file.    Social History     Socioeconomic History    Marital status:    Tobacco Use    Smoking status: Never    Smokeless tobacco: Never   Vaping Use    Vaping status: Never Used   Substance and Sexual Activity    Alcohol use: Not Currently    Drug use: Never     Social Determinants of Health     Food Insecurity: Low Risk  (4/20/2024)    Received from [facility]    Food Insecurity     Have there been times that your food ran out, and you didn't have money to get more?: No     Are there times that you worry that this might happen?: No   Transportation Needs: Low Risk  (4/20/2024)    Received from [facility]    Transportation Needs     Do you have trouble getting transportation to medical appointments?: No       Review of Systems    Positive for stated complaint: Back Pain  Other systems are as noted in HPI.  Constitutional and vital signs reviewed.      All other systems reviewed and negative except as noted above.    PSF  elements reviewed from today and agreed except as otherwise stated in HPI.    Physical Exam     ED Triage Vitals [06/04/24 2029]   BP (!) 152/91   Pulse 89   Resp 20   Temp 97.9 °F (36.6 °C)   Temp src Temporal   SpO2 93 %   O2 Device None (Room air)       Current:BP (!) 142/99   Pulse 72   Temp 97.9 °F (36.6 °C) (Temporal)   Resp 18   Ht 165 cm (5' 4.96\")   Wt (!) 142 kg   SpO2 95%   BMI 52.16 kg/m²     PULSE OX nl      General: Patient appears in mild distress, slow to transition  Neck: Supple, full range of motion, no midline bony tenderness  Abdomen: Soft morbidly obese, nontender nondistended, no mass or prominent aortic pulsation  Back: Tender to palpation in  b lumbar paraspinal region, no midline bony tenderness, no erythema, decreased range of motion secondary to pain and spasm  Tender b knees no sig effusion no erythema or warmth  Straight leg tests: + bilateral  Neuro: Patient is alert and oriented x3, able to ambulate with steady gait, 5 out of 5 strength bilateral lower extremities hips knees and ankle flexion and extension, dorsiflexion and plantarflexion of the foot preserved bilateral 5 out of 5 strength, sensation intact from sacral region throughout lower extremities down to the dorsal surface of the foot, 2+ DTRs of the knee and ankle  Extremities:Nontender full range of motion in bilateral lower extremities, no edema, 2+ distal pulses         ED Course     Labs Reviewed   URINALYSIS WITH CULTURE REFLEX - Abnormal; Notable for the following components:       Result Value    Clarity Urine Turbid (*)     Spec Gravity >1.030 (*)     Protein Urine 20 (*)     Leukocyte Esterase Urine 25 (*)     Squamous Epi. Cells Few (*)     Ca Oxalate Crystals Many (*)     All other components within normal limits   URINE CULTURE, ROUTINE - Abnormal; Notable for the following components:    Urine Culture 10,000 - 50,000 CFU/ML Non hemolytic Streptococcus (*)     All other components within normal limits        MDM     XR KNEE (1 OR 2 VIEWS), RIGHT (CPT=73560)    Result Date: 6/5/2024  CONCLUSION:  1. No acute finding. 2. Osteoarthritis right knee.    Dictated by (CST): Mahamed Park MD on 6/05/2024 at 1:05 PM     Finalized by (CST): Mahamed Park MD on 6/05/2024 at 1:06 PM          XR TIBIA + FIBULA (2 VIEWS), LEFT (CPT=73590)    Result Date: 6/5/2024  CONCLUSION:  1. No acute finding.    Dictated by (CST): Mahamed Park MD on 6/05/2024 at 1:04 PM     Finalized by (CST): Mahamed Park MD on 6/05/2024 at 1:05 PM          XR LUMBAR SPINE (MIN 2 VIEWS) (CPT=72100)    Result Date: 6/5/2024  CONCLUSION:  1. Multilevel degenerative disc disease/spondylosis.    Dictated by (CST): Mahamed Park MD on 6/05/2024 at 1:02 PM     Finalized by (CST): Mahamed Park MD on 6/05/2024 at 1:03 PM             I reviewed xray noted no fracture or dislocation      Medical Decision Making  Problems Addressed:  Arthritis of knee: chronic illness or injury  Plantar fasciitis of left foot: chronic illness or injury  Strain of lumbar region, initial encounter: acute illness or injury    Amount and/or Complexity of Data Reviewed  Radiology: ordered and independent interpretation performed. Decision-making details documented in ED Course.  Discussion of management or test interpretation with external provider(s): Needs to loose weight will have continued back and knee pain going forward if not.  Referred to bariatric clinic.    Tylenol, motrin recommended.      Risk  OTC drugs.  Prescription drug management.  Parenteral controlled substances.              Disposition and Plan     Clinical Impression:  1. Strain of lumbar region, initial encounter    2. Arthritis of knee    3. Plantar fasciitis of left foot        Disposition:  Discharge    Follow-up:  Chantel De La Rosa, DO  [address]  [facility] [address]  [phone]    Follow up      [facility]  [address]  [facility]  [address]  [phone]  Follow up        Medications Prescribed:  Discharge Medication List as of 6/5/2024  1:13 AM        START taking these medications    Details   lidocaine 5 % External Patch Place 1 patch onto the skin daily for 10 days., Normal, Disp-10 patch, R-0      predniSONE 20 MG Oral Tab Take 2 tablets (40 mg total) by mouth daily for 5 days., Normal, Disp-10 tablet, R-0      traMADol 50 MG Oral Tab Take 1 tablet (50 mg total) by mouth every 8 (eight) hours as needed for Pain., Normal, Disp-15 tablet, R-0             Present on Admission:  **None**

## 2024-06-22 ENCOUNTER — OFFICE VISIT (OUTPATIENT)
Facility: CLINIC | Age: 59
End: 2024-06-22

## 2024-06-22 VITALS
DIASTOLIC BLOOD PRESSURE: 84 MMHG | HEART RATE: 76 BPM | SYSTOLIC BLOOD PRESSURE: 136 MMHG | WEIGHT: 293 LBS | HEIGHT: 64 IN | BODY MASS INDEX: 50.02 KG/M2

## 2024-06-22 DIAGNOSIS — Z01.419 ENCOUNTER FOR WELL WOMAN EXAM WITH ROUTINE GYNECOLOGICAL EXAM: Primary | ICD-10-CM

## 2024-06-22 DIAGNOSIS — Z12.4 CERVICAL CANCER SCREENING: ICD-10-CM

## 2024-06-22 DIAGNOSIS — R30.0 DYSURIA: ICD-10-CM

## 2024-06-22 DIAGNOSIS — Z12.31 BREAST CANCER SCREENING BY MAMMOGRAM: ICD-10-CM

## 2024-06-22 DIAGNOSIS — R10.2 PELVIC PAIN: ICD-10-CM

## 2024-06-22 LAB
BILIRUB UR QL STRIP.AUTO: NEGATIVE
COLOR UR AUTO: YELLOW
GLUCOSE UR STRIP.AUTO-MCNC: NORMAL MG/DL
KETONES UR STRIP.AUTO-MCNC: NEGATIVE MG/DL
LEUKOCYTE ESTERASE UR QL STRIP.AUTO: NEGATIVE
NITRITE UR QL STRIP.AUTO: NEGATIVE
PH UR STRIP.AUTO: 5.5 [PH] (ref 5–8)
PROT UR STRIP.AUTO-MCNC: NEGATIVE MG/DL
RBC UR QL AUTO: NEGATIVE
SP GR UR STRIP.AUTO: 1.02 (ref 1–1.03)
UROBILINOGEN UR STRIP.AUTO-MCNC: NORMAL MG/DL

## 2024-06-22 PROCEDURE — 87624 HPV HI-RISK TYP POOLED RSLT: CPT

## 2024-06-22 PROCEDURE — 81001 URINALYSIS AUTO W/SCOPE: CPT

## 2024-06-22 PROCEDURE — 87077 CULTURE AEROBIC IDENTIFY: CPT

## 2024-06-22 PROCEDURE — 88175 CYTOPATH C/V AUTO FLUID REDO: CPT

## 2024-06-22 PROCEDURE — 87086 URINE CULTURE/COLONY COUNT: CPT

## 2024-06-22 NOTE — PROGRESS NOTES
Judi White is a 59 year old female  No LMP recorded. (Menstrual status: Menopause).   Chief Complaint   Patient presents with    Wellness Visit     Last pap was done in [facility] in  neg per pt    Other     Pelvic and back pain started 2/3 weeks ago   No burning sensation   .  She was seen in the [place] ED for her back pain and treated with Keflex for her UTi symptoms. She was also treated for her Advise her to follow up with her PCP as directed.     She also reports that she has pain in her pelvis, feels her \"uterus is falling down and pressing on her bladder.\"    OBSTETRICS HISTORY:  OB History    Para Term  AB Living   3 3           SAB IAB Ectopic Multiple Live Births                  # Outcome Date GA Lbr José/2nd Weight Sex Type Anes PTL Lv   3 Para            2 Para            1 Para                GYNE HISTORY:  Periods none due to menopause    History   Sexual Activity    Sexual activity: Not Currently        Hx Prior Abnormal Pap: No  Pap Date:  (2 years ago per pt)  Pap Result Notes: neg per pt        MEDICAL HISTORY:  Past Medical History:    Amenorrhea    Anxiety    High blood pressure    Hypertension    Shortness of breath    Sleep apnea    Varicose vein       SURGICAL HISTORY:  History reviewed. No pertinent surgical history.    SOCIAL HISTORY:  Social History     Socioeconomic History    Marital status:      Spouse name: Not on file    Number of children: Not on file    Years of education: Not on file    Highest education level: Not on file   Occupational History    Not on file   Tobacco Use    Smoking status: Never    Smokeless tobacco: Never   Vaping Use    Vaping status: Never Used   Substance and Sexual Activity    Alcohol use: Not Currently    Drug use: Never    Sexual activity: Not Currently   Other Topics Concern    Caffeine Concern No    Exercise No    Seat Belt No    Special Diet No    Stress Concern No    Weight Concern Yes   Social History Narrative     Not on file     Social Determinants of Health     Financial Resource Strain: Not on file   Food Insecurity: Low Risk  (4/20/2024)    Received from [facility]    Food Insecurity     Have there been times that your food ran out, and you didn't have money to get more?: No     Are there times that you worry that this might happen?: No   Transportation Needs: Low Risk  (4/20/2024)    Received from [facility]    Transportation Needs     Do you have trouble getting transportation to medical appointments?: No     How do you normally get to and from your appointments?: Not on file   Physical Activity: Not on file   Stress: Not on file   Social Connections: Not on file   Housing Stability: Not on file (4/20/2024)       FAMILY HISTORY:  History reviewed. No pertinent family history.    MEDICATIONS:    Current Outpatient Medications:     Potassium Chloride ER 10 MEQ Oral Tab CR, Take 1 tab po daily  together  with  furosemide, Disp: 90 tablet, Rfl: 0    furosemide 20 MG Oral Tab, Take  1 tab po daily, Disp: 90 tablet, Rfl: 0    ergocalciferol 1.25 MG (40774 UT) Oral Cap, Take 1 capsule (50,000 Units total) by mouth once a week. With food for 12 weeks total then begin OTC Vitamin D at 2000 units daily with food thereafter, Disp: 12 capsule, Rfl: 0    metFORMIN  MG Oral Tablet 24 Hr, Take 1 tablet (750 mg total) by mouth daily., Disp: 90 tablet, Rfl: 0    mometasone 0.1 % External Ointment, Apply 1 Application topically daily. Apply to entrance of ear canal before sleep for `10-14 days, Disp: 1 each, Rfl: 0    nortriptyline 25 MG Oral Cap, Start with 1 pill QHS, for 1 week, then 2 pills qhs foor another week, then 3 pills qhs, Disp: 90 capsule, Rfl: 3    metoprolol succinate ER 25 MG Oral Tablet 24 Hr, Take 1 tablet (25 mg total) by mouth daily., Disp: 90 tablet, Rfl: 1    ALLERGIES:    Allergies   Allergen Reactions    Lactose ITCHING         Review of  Systems:  Constitutional:  Denies fatigue, night sweats, hot flashes  Eyes:  denies blurred or double vision  Cardiovascular:  denies chest pain or palpitations  Respiratory:  denies shortness of breath  Gastrointestinal:  denies heartburn, abdominal pain, diarrhea or constipation  Genitourinary:  denies dysuria, incontinence, abnormal vaginal discharge, vaginal itching  Musculoskeletal:  denies back pain.  Skin/Breast:  Denies any breast pain, lumps, or discharge.   Neurological:  denies headaches, extremity weakness or numbness.  Psychiatric: denies depression or anxiety.  Endocrine:   denies excessive thirst or urination.  Heme/Lymph:  denies history of anemia, easy bruising or bleeding.      PHYSICAL EXAM:   Constitutional: well developed, well nourished  Head/Face: normocephalic  Neck/Thyroid: thyroid symmetric, no thyromegaly, no nodules, no adenopathy  Lymphatic:no abnormal supraclavicular or axillary adenopathy is noted  Breast: normal without palpable masses, tenderness, asymmetry, nipple discharge, nipple retraction or skin changes  Abdomen:  soft, nontender, nondistended, no masses  Skin/Hair: no unusual rashes or bruises  Extremities: no edema, no cyanosis  Psychiatric:  Oriented to time, place, person and situation. Appropriate mood and affect    Pelvic Exam:  External Genitalia: normal appearance, hair distribution, and no lesions  Urethral Meatus:  normal in size, location, without lesions and prolapse  Bladder:  No fullness, masses or tenderness  Vagina:  Normal appearance without lesions, no abnormal discharge  Cervix:  Normal without tenderness on motion  Uterus: normal in size, contour, position, mobility, without tenderness  Adnexa: normal without masses or tenderness  Perineum: normal  Anus: no hemorroids     Assessment & Plan:  Diagnoses and all orders for this visit:    Encounter for well woman exam with routine gynecological exam    Dysuria  -     Culture Urine; Future  -     Urinalysis,  Routine; Future    Cervical cancer screening  -     Hpv High Risk , Thin Prep Collect  -     ThinPrep PAP Smear B    Breast cancer screening by mammogram  -     [facility] DINORAH 2D+3D SCREENING BILAT (CPT=77067/87461); Future    Pelvic pain  -     US PELVIS W EV (CPT=76856/86655); Future      Advise her to continue her appointments with the [facility], discussed her weight may be contributing to her lower back and pelvic pain.

## 2024-06-24 ENCOUNTER — TELEPHONE (OUTPATIENT)
Dept: INTERNAL MEDICINE CLINIC | Facility: CLINIC | Age: 59
End: 2024-06-24

## 2024-06-24 LAB — HPV E6+E7 MRNA CVX QL NAA+PROBE: NEGATIVE

## 2024-06-24 NOTE — TELEPHONE ENCOUNTER
Patient indicated that she got a message in trippiece that overdue for pulmonary function test that was ordered on 6/28/2023, patient was not aware that doctor ordered this test. Wanted to verify if still needs to get this test done? If so, would need a new order since expiring 6/28/2024. Also patient had a CT scan of the chest in 1/2024. Wanted to know if doctor can order follow up CT, since due in July? Please advise    FYI- patient was also in the emergency room on 6/4/2024 for back pain. Has appointment with Dr Chantel De La Rosa on 7/3/2024. Wanted doctor review notes/testing to make sure did not need further testing.

## 2024-06-26 LAB
.: NORMAL
.: NORMAL

## 2024-06-27 NOTE — TELEPHONE ENCOUNTER
Please call patient or son patient does not need CT scan this year according to advise from previous CT scan of the lungs in January.  She should come for follow-up appointment to review all her concerns.  I am out of the office for 2 weeks

## 2024-06-27 NOTE — TELEPHONE ENCOUNTER
Call transferred from [facility]. Ukranian Language Line: Louise ID # [id number]. Patient called, verified Name and . Reviewed Dr. Multani's message below. Agreed to be seen by available provider. Appointment scheduled.    Future Appointments   Date Time Provider Department Center   2024  2:00 PM [id number]

## 2024-06-27 NOTE — TELEPHONE ENCOUNTER
Called patient using  Estefania was able to relate to patient no need of CT of lungs needed, and then I lost the  and patient connection  Did call back heard interpreters name Jing ID # [id number], and got disconnected again.    Tried again the language line got  Louise ID # [id number]     was able to relate all information and patient stated she needs appointment as soon as possible due to being in hospital due to pain in back, abdomen, heart and high blood pressure.  Per patient blood pressure is still high feels Losartan is not helping any more. Call transferred to RN line

## 2024-06-28 ENCOUNTER — LAB ENCOUNTER (OUTPATIENT)
Dept: LAB | Age: 59
End: 2024-06-28
Attending: INTERNAL MEDICINE
Payer: MEDICAID

## 2024-06-28 ENCOUNTER — OFFICE VISIT (OUTPATIENT)
Dept: INTERNAL MEDICINE CLINIC | Facility: CLINIC | Age: 59
End: 2024-06-28

## 2024-06-28 ENCOUNTER — TELEPHONE (OUTPATIENT)
Facility: CLINIC | Age: 59
End: 2024-06-28

## 2024-06-28 VITALS
WEIGHT: 293 LBS | BODY MASS INDEX: 50.02 KG/M2 | HEART RATE: 91 BPM | RESPIRATION RATE: 16 BRPM | SYSTOLIC BLOOD PRESSURE: 118 MMHG | HEIGHT: 64 IN | OXYGEN SATURATION: 92 % | DIASTOLIC BLOOD PRESSURE: 80 MMHG

## 2024-06-28 DIAGNOSIS — L65.9 ALOPECIA: Primary | ICD-10-CM

## 2024-06-28 DIAGNOSIS — E11.9 CONTROLLED TYPE 2 DIABETES MELLITUS WITHOUT COMPLICATION, WITHOUT LONG-TERM CURRENT USE OF INSULIN (HCC): ICD-10-CM

## 2024-06-28 DIAGNOSIS — R60.0 BILATERAL LEG EDEMA: ICD-10-CM

## 2024-06-28 DIAGNOSIS — R21 RASH: ICD-10-CM

## 2024-06-28 DIAGNOSIS — E66.01 CLASS 3 SEVERE OBESITY DUE TO EXCESS CALORIES WITH SERIOUS COMORBIDITY AND BODY MASS INDEX (BMI) OF 50.0 TO 59.9 IN ADULT (HCC): ICD-10-CM

## 2024-06-28 LAB
ANION GAP SERPL CALC-SCNC: 6 MMOL/L (ref 0–18)
BUN BLD-MCNC: 13 MG/DL (ref 9–23)
BUN/CREAT SERPL: 18.8 (ref 10–20)
CALCIUM BLD-MCNC: 9.2 MG/DL (ref 8.7–10.4)
CHLORIDE SERPL-SCNC: 107 MMOL/L (ref 98–112)
CO2 SERPL-SCNC: 29 MMOL/L (ref 21–32)
CREAT BLD-MCNC: 0.69 MG/DL
EGFRCR SERPLBLD CKD-EPI 2021: 100 ML/MIN/1.73M2 (ref 60–?)
FASTING STATUS PATIENT QL REPORTED: YES
GLUCOSE BLD-MCNC: 106 MG/DL (ref 70–99)
OSMOLALITY SERPL CALC.SUM OF ELEC: 295 MOSM/KG (ref 275–295)
POTASSIUM SERPL-SCNC: 4.6 MMOL/L (ref 3.5–5.1)
SODIUM SERPL-SCNC: 142 MMOL/L (ref 136–145)

## 2024-06-28 PROCEDURE — 99214 OFFICE O/P EST MOD 30 MIN: CPT | Performed by: INTERNAL MEDICINE

## 2024-06-28 PROCEDURE — 80048 BASIC METABOLIC PNL TOTAL CA: CPT

## 2024-06-28 PROCEDURE — 36415 COLL VENOUS BLD VENIPUNCTURE: CPT

## 2024-06-28 RX ORDER — TRIAMCINOLONE ACETONIDE 1 MG/G
CREAM TOPICAL 2 TIMES DAILY PRN
Qty: 60 G | Refills: 3 | Status: SHIPPED | OUTPATIENT
Start: 2024-06-28

## 2024-06-28 RX ORDER — DULAGLUTIDE 0.75 MG/.5ML
INJECTION, SOLUTION SUBCUTANEOUS
Qty: 0.5 ML | Refills: 2 | Status: SHIPPED | OUTPATIENT
Start: 2024-06-28 | End: 2024-10-26

## 2024-06-28 NOTE — PROGRESS NOTES
Judi White is a 59 year old female.  Chief Complaint   Patient presents with    ER F/U     HPI:     Patient presented today for follow up. She states that she was recentlly told that her blood glucose is high and she also went to the ER for back pain. She wants to focus on losing weight in order to improve her sugars and back pain. She states that she used to be on Saxenda in [facility], and will like to start it again, because I want to improve my health.    Diet  She avoids sugar or high carb food  Eat once a day, appetite is low     Exercise  - she is scared of walking a lot because of back pain, and also have anxiety of walking outside.      She also states that she was diagnosed with sarcoidosis in [facility], but she is not sure if she has it. She had a CT scan done in [facility] and would like to discuss results.    She also states that she was recently treated with antibiotics for UTI and since than feels that her shortness of breath has worsened.    Also discovered a bald spot on her scalp few days ago. Rash between index and middle finger of right hand      Current Outpatient Medications   Medication Sig Dispense Refill    Dulaglutide (TRULICITY) 0.75 MG/0.5ML Subcutaneous Solution Pen-injector Inject 0.75 mg into the skin every 7 days for 30 days, THEN 1.5 mg every 7 days. 0.5 mL 2    triamcinolone 0.1 % External Cream Apply topically 2 (two) times daily as needed. 60 g 3    Potassium Chloride ER 10 MEQ Oral Tab CR Take 1 tab po daily  together  with  furosemide 90 tablet 0    furosemide 20 MG Oral Tab Take  1 tab po daily 90 tablet 0    ergocalciferol 1.25 MG (16060 UT) Oral Cap Take 1 capsule (50,000 Units total) by mouth once a week. With food for 12 weeks total then begin OTC Vitamin D at 2000 units daily with food thereafter 12 capsule 0    metFORMIN  MG Oral Tablet 24 Hr Take 1 tablet (750 mg total) by mouth daily. 90 tablet 0    mometasone 0.1 % External Ointment Apply 1 Application topically  daily. Apply to entrance of ear canal before sleep for `10-14 days 1 each 0    nortriptyline 25 MG Oral Cap Start with 1 pill QHS, for 1 week, then 2 pills qhs foor another week, then 3 pills qhs 90 capsule 3    metoprolol succinate ER 25 MG Oral Tablet 24 Hr Take 1 tablet (25 mg total) by mouth daily. 90 tablet 1      Past Medical History:    Amenorrhea    Anxiety    High blood pressure    Hypertension    Shortness of breath    Sleep apnea    Varicose vein      History reviewed. No pertinent surgical history.   Social History:  Social History     Socioeconomic History    Marital status:    Tobacco Use    Smoking status: Never    Smokeless tobacco: Never   Vaping Use    Vaping status: Never Used   Substance and Sexual Activity    Alcohol use: Not Currently    Drug use: Never    Sexual activity: Not Currently   Other Topics Concern    Caffeine Concern No    Exercise No    Seat Belt No    Special Diet No    Stress Concern No    Weight Concern Yes     Social Determinants of Health     Food Insecurity: Low Risk  (4/20/2024)    Received from [facility]    Food Insecurity     Have there been times that your food ran out, and you didn't have money to get more?: No     Are there times that you worry that this might happen?: No   Transportation Needs: Low Risk  (4/20/2024)    Received from [facility]    Transportation Needs     Do you have trouble getting transportation to medical appointments?: No      History reviewed. No pertinent family history.   Allergies   Allergen Reactions    Lactose ITCHING        REVIEW OF SYSTEMS:   Review of Systems   Review of Systems   Constitutional: Negative for activity change, appetite change and fever.   HENT: Negative for congestion and voice change.    Respiratory: Negative for cough and shortness of breath.    Cardiovascular: Negative for chest pain.   Gastrointestinal: Negative for abdominal distention, abdominal pain and vomiting.    Genitourinary: Negative for hematuria.   Skin: Negative for wound.   Psychiatric/Behavioral: Negative for behavioral problems.   Wt Readings from Last 5 Encounters:   06/28/24 (!) 312 lb 8 oz (141.7 kg)   06/22/24 (!) 314 lb 6.4 oz (142.6 kg)   06/04/24 (!) 313 lb 0.9 oz (142 kg)   05/03/24 (!) 313 lb (142 kg)   11/18/23 (!) 311 lb (141.1 kg)     Body mass index is 53.64 kg/m².      EXAM:   /80 (BP Location: Right arm, Patient Position: Sitting, Cuff Size: large)   Pulse 91   Resp 16   Ht 5' 4\" (1.626 m)   Wt (!) 312 lb 8 oz (141.7 kg)   SpO2 92%   BMI 53.64 kg/m²   Physical Exam   Constitutional:       Appearance: Normal appearance.   HENT:      Head: Normocephalic.   Eyes:      Conjunctiva/sclera: Conjunctivae normal.   Cardiovascular:      Rate and Rhythm: Normal rate and regular rhythm.      Heart sounds: Normal heart sounds. No murmur heard.  Pulmonary:      Effort: Pulmonary effort is normal.      Breath sounds: Normal breath sounds. No rhonchi or rales.   Abdominal:      General: Bowel sounds are normal.      Palpations: Abdomen is soft.      Tenderness: There is no abdominal tenderness.   Musculoskeletal:      Cervical back: Neck supple.      Right lower leg: No edema.      Left lower leg: No edema.   Skin:     General: Skin is warm and dry.   Neurological:      General: No focal deficit present.      Mental Status: He is alert and oriented to person, place, and time. Mental status is at baseline.   Psychiatric:         Mood and Affect: Mood normal.         Behavior: Behavior normal.       ASSESSMENT AND PLAN:   1. Alopecia  - DERM - INTERNAL    2. Class 3 severe obesity due to excess calories with serious comorbidity and body mass index (BMI) of 50.0 to 59.9 in adult (HCC)  - detailed discussion about diet. Monitor for low carb diet  - Also encouraged exercise. She agrees to start walking in the home.   - continue metformin  - add trulicity once a week injection  - side effects reviewed  -  increase hydration       3. Controlled type 2 diabetes mellitus without complication, without long-term current use of insulin (HCC)  - continue metformin  - start trulicity    4. Rash  - triamcinolone cream       The patient indicates understanding of these issues and agrees to the plan.     services utilized  Ashleigh Herrera MD

## 2024-06-28 NOTE — TELEPHONE ENCOUNTER
Patient's daughter called to speak with Dr. Mayer's nurse in regards to questions about letter that was received about completing a liver  test. Please call.

## 2024-07-01 NOTE — TELEPHONE ENCOUNTER
Spoke to patient's daughter  OK per SISI    She was asking if her mother still needed to complete the Live US    I explained that the patient still needs to complete this test  I provided the phone number to central scheduling to schedule US    She verbalized understanding and had no further questions at this time

## 2024-07-01 NOTE — TELEPHONE ENCOUNTER
Chief Complaint   Patient presents with    Well Child     18 month [facility]. He drinks lactose free milk. 8oz in the morning, 8 oz at nighttime. He eats 3 meals a day, with snacks. 12-13 wet diapers and 2-3 dirty diapers in day. Mother is concerned that he doesn't talk very much. Visit Vitals  Temp 97.6 °F (36.4 °C) (Temporal)   Ht (!) 2' 10.06\" (0.865 m)   Wt 25 lb 15.5 oz (11.8 kg)   HC 48.3 cm   BMI 15.74 kg/m²     1. Have you been to the ER, urgent care clinic since your last visit? Hospitalized since your last visit? No    2. Have you seen or consulted any other health care providers outside of the [address] [place] [address] since your last visit? Include any pap smears or colon screening.  No Yes.

## 2024-07-03 ENCOUNTER — OFFICE VISIT (OUTPATIENT)
Dept: PHYSICAL MEDICINE AND REHAB | Facility: CLINIC | Age: 59
End: 2024-07-03
Payer: MEDICAID

## 2024-07-03 VITALS — WEIGHT: 293 LBS | BODY MASS INDEX: 48.82 KG/M2 | HEIGHT: 64.9 IN

## 2024-07-03 DIAGNOSIS — M51.26 HNP (HERNIATED NUCLEUS PULPOSUS), LUMBAR: ICD-10-CM

## 2024-07-03 DIAGNOSIS — M54.16 BILATERAL LUMBAR RADICULOPATHY: Primary | ICD-10-CM

## 2024-07-03 DIAGNOSIS — M47.816 LUMBAR FACET ARTHROPATHY: ICD-10-CM

## 2024-07-03 DIAGNOSIS — E66.01 MORBID OBESITY WITH BMI OF 50.0-59.9, ADULT (HCC): ICD-10-CM

## 2024-07-03 PROCEDURE — 99204 OFFICE O/P NEW MOD 45 MIN: CPT | Performed by: PHYSICAL MEDICINE & REHABILITATION

## 2024-07-03 RX ORDER — MELOXICAM 15 MG/1
15 TABLET ORAL DAILY
Qty: 14 TABLET | Refills: 0 | Status: SHIPPED | OUTPATIENT
Start: 2024-07-03 | End: 2024-07-17

## 2024-07-03 NOTE — PROGRESS NOTES
[facility]  NEW PATIENT EVALUATION      HISTORY OF PRESENT ILLNESS:     Chief Complaint   Patient presents with    New Patient     NP -Right handed here for sharp low back pain (centered) that doesn't radiate/ Pain intermittent, worst with sitting or walking. Pain started around May. Denies any accident or injury. Patient was in ED recently for back pain. XR of Lumbar spine done on 06/04/2024. Was given Tramadol in ED which helps at times tried Ibuprofen as well (little help). Has a referral for PT, but has not scheduled it yet.       The patient is a 59 year old female with significant past medical history of anxiety, hypertension, sleep apnea, varicose veins, amenorrhea, morbid obesity who presents with low back pain.  Patient states in the midline the pain started in May.  Presented to the ER in June where she had x-ray imaging of the lumbar spine.  Given tramadol for the pain.  She states pain is no better.  Pain is rated 10 out of 10.  Does report numbness tingling sensation bilateral legs feet and legs with ambulation and activity.  She feels like something is crawling when she starts walking.  She denies any saddle anesthesia, any fevers chills or weight loss.  Is described as sharp and stabbing in nature.  Pain is worse with walking and standing and ambulation activity. patient states she has lost 8 pounds since April of this year and is working with weight loss management    PHYSICAL EXAM:   Wt (!) 312 lb (141.5 kg)   BMI 53.55 kg/m²     Gait  Able to toe walk and heel walk without any difficulty    LUMBAR SPINE:  Inspection: no erythema, swelling, or obvious deformity.  Their iliac crest and shoulder heights are symmetrical.     Palpation: Non tender to palpation of the spinous process.   ROM: FAROM but pain with end range of flexion and extension.  Strength: 5/5 in bilateral lower extremities  Sensation: Intact to light touch in all dermatomes of the lower  extremities  Reflexes: 2/4 at L4 and S1  Facet Loading: Positive bilateral lower lumbar facet joints  Straight leg raise: negative for radicular pain symptoms  Slump test: negative for pain symptoms for radicular pain symptoms      IMAGING:     Xray Lumbar spine completed 6/4/2024 is notable for mild degenerative disc disease as well as spondylosis in the lumbar spine    All imaging results were reviewed and discussed with patient.      ASSESSMENT/PLAN:   No diagnosis found.    Judi White is a 58-year-old female presenting today for evaluation of low back pain in the axial lumbar spine but with radiation bilateral legs consistent with possible lower lumbar disc herniation with lumbar radiculopathy.  I recommended starting PT program with home exercises.  Given her morbid obesity status as well as diabetes and ongoing weight loss management, I have refrain from prescribing a Medrol Dosepak but advised her to start meloxicam 15 mg daily.  Recommend she use topical treatment with lidocaine patch.  Physical therapy and home exercises was recommended and advised her to continue working on weight loss management.  Will follow-up in 4 weeks and if pain is no better we will obtain MRI imaging for further evaluation.      The patient verbalized understanding with the plan and was in agreement. All questions/concerns were addressed and there were no barriers to learning.  Please note Dragon dictation software was used to dictate this note and may result in inadvertent typos.    Chantel De La Rosa DO, FAAPMR & CAQSM  Physical Medicine and Rehabilitation  Sports and Spine Medicine    PAST MEDICAL HISTORY:     Past Medical History:    Amenorrhea    Anxiety    High blood pressure    Hypertension    Shortness of breath    Sleep apnea    Varicose vein         PAST SURGICAL HISTORY:   No past surgical history on file.      CURRENT MEDICATIONS:     Current Outpatient Medications   Medication Sig Dispense Refill    Dulaglutide  (TRULICITY) 0.75 MG/0.5ML Subcutaneous Solution Pen-injector Inject 0.75 mg into the skin every 7 days for 30 days, THEN 1.5 mg every 7 days. 0.5 mL 2    triamcinolone 0.1 % External Cream Apply topically 2 (two) times daily as needed. 60 g 3    Potassium Chloride ER 10 MEQ Oral Tab CR Take 1 tab po daily  together  with  furosemide 90 tablet 0    furosemide 20 MG Oral Tab Take  1 tab po daily 90 tablet 0    ergocalciferol 1.25 MG (99366 UT) Oral Cap Take 1 capsule (50,000 Units total) by mouth once a week. With food for 12 weeks total then begin OTC Vitamin D at 2000 units daily with food thereafter 12 capsule 0    metFORMIN  MG Oral Tablet 24 Hr Take 1 tablet (750 mg total) by mouth daily. 90 tablet 0    mometasone 0.1 % External Ointment Apply 1 Application topically daily. Apply to entrance of ear canal before sleep for `10-14 days 1 each 0    nortriptyline 25 MG Oral Cap Start with 1 pill QHS, for 1 week, then 2 pills qhs foor another week, then 3 pills qhs 90 capsule 3    metoprolol succinate ER 25 MG Oral Tablet 24 Hr Take 1 tablet (25 mg total) by mouth daily. 90 tablet 1         ALLERGIES:     Allergies   Allergen Reactions    Lactose ITCHING         FAMILY HISTORY:   No family history on file.       SOCIAL HISTORY:     Social History     Socioeconomic History    Marital status:    Tobacco Use    Smoking status: Never    Smokeless tobacco: Never   Vaping Use    Vaping status: Never Used   Substance and Sexual Activity    Alcohol use: Not Currently    Drug use: Never    Sexual activity: Not Currently   Other Topics Concern    Caffeine Concern No    Exercise No    Seat Belt No    Special Diet No    Stress Concern No    Weight Concern Yes     Social Determinants of Health     Food Insecurity: Low Risk  (4/20/2024)    Received from [facility]    Food Insecurity     Have there been times that your food ran out, and you didn't have money to get more?: No     Are there times  that you worry that this might happen?: No   Transportation Needs: Low Risk  (4/20/2024)    Received from [facility]    Transportation Needs     Do you have trouble getting transportation to medical appointments?: No          REVIEW OF SYSTEMS:   Patient-reported ROS  Constitutional  Sleep Disturbance: admits  Chills: denies  Fever: denies  Weight Gain: denies  Weight Loss: denies   Cardiovascular  Chest Pain: denies  Irregular Heartbeat: denies   Respiratory  Painful Breathing: denies  Wheezing: denies   Gastrointestinal  Bowel Incontinence: denies  Heartburn: denies  Abdominal Pain: denies  Blood in Stool : denies  Rectal Pain: denies   Hematology  Easy Bruising: denies  Easy Bleeding: denies   Genitourinary  Difficulty Urinating: denies  Bladder Incontinence: denies  Pelvic Pain: denies  Painful Urination: denies   Musculoskeletal  Joint Stiffness: denies  Painful Joints: denies  Tailbone Pain: denies  Swollen Joints: denies   Peripheral Vascular  Swelling of Legs/Feet: denies  Cold Extremities: denies   Skin  Open Sores: denies  Nodules or Lumps: denies  Rash: denies   Neurological  Loss of Strength Since last Visit: denies  Tingling/Numbness: denies  Balance: denies   Psychiatric  Anxiety: denies  Depressed Mood: denies       PHYSICAL EXAM:   General: No immediate distress  Head: Normocephalic/ Atraumatic  Eyes: Extra-occular movements intact.   Ears: No auricular hematoma or deformities  Mouth: No lesions or ulcerations  Heart: peripheral pulses intact. Normal capillary refill.   Lungs: Non-labored respirations  Abdomen: No abdominal guarding  Extremities: No lower extremity edema bilaterally   Skin: No lesions noted   Cognition: alert & oriented x 3, attentive, able to follow 2 step commands, comprehention intact, spontaneous speech intact  Psychiatric: Mood and affect appropriate      LABS:     Lab Results   Component Value Date     (H) 04/18/2024    A1C 6.6 (H) 04/18/2024      Lab Results   Component Value Date    WBC 6.0 04/18/2024    RBC 4.82 04/18/2024    HGB 13.1 04/18/2024    HCT 41.7 04/18/2024    MCV 86.5 04/18/2024    MCH 27.2 04/18/2024    MCHC 31.4 04/18/2024    RDW 13.5 04/18/2024    .0 04/18/2024     Lab Results   Component Value Date     (H) 06/28/2024    BUN 13 06/28/2024    BUNCREA 18.8 06/28/2024    CREATSERUM 0.69 06/28/2024    ANIONGAP 6 06/28/2024    CA 9.2 06/28/2024    OSMOCALC 295 06/28/2024    ALKPHO 91 04/18/2024    AST 97 (H) 04/18/2024     (H) 04/18/2024    BILT 0.8 04/18/2024    TP 8.1 04/18/2024    ALB 4.7 04/18/2024    GLOBULIN 3.4 04/18/2024     06/28/2024    K 4.6 06/28/2024     06/28/2024    CO2 29.0 06/28/2024     No results found for: \"PTP\", \"PT\", \"INR\"  Lab Results   Component Value Date    VITD 25.9 (L) 04/18/2024

## 2024-07-08 ENCOUNTER — TELEPHONE (OUTPATIENT)
Dept: INTERNAL MEDICINE CLINIC | Facility: CLINIC | Age: 59
End: 2024-07-08

## 2024-07-08 RX ORDER — ONDANSETRON 4 MG/1
4 TABLET, FILM COATED ORAL EVERY 12 HOURS PRN
Qty: 20 TABLET | Refills: 0 | Status: SHIPPED | OUTPATIENT
Start: 2024-07-08

## 2024-07-08 NOTE — TELEPHONE ENCOUNTER
Please notify patient  Nausea and vomiting are common side effects of trulicity. It should get better as your body gets used to the medication. I am sending prescription for nausea medication which you can take twice a day as needed for nausea.   Please follow up in office for evaluation if no improvement.

## 2024-07-08 NOTE — TELEPHONE ENCOUNTER
I spoke to the patient's friend Melvina (HIPAA) advised of normal side effects and anti nausea medication.  Patient also added that after she had explosive diarrhea, she had some blood on the toilet paper.  I advised that this could possibly be due to the diarrhea and/or possible hemorrhoids (although there is no pain associated with this).  Will monitor as the nausea gets under control.  To call back on Wednesday to update.  If bleeding increases or pain begins, pt is to call back sooner.

## 2024-07-08 NOTE — TELEPHONE ENCOUNTER
As per Melvina (HIPAA), patient started Trulicity on 06/28.  Since that time, pt has been experiencing nausea and is not able to eat as usual.  As had diarrhea initially but that has subsided. Patient is asking for a call back from the doctor regarding this.  Asking is this a side effect and if so, how long will it last. Of note, blood sugar is down below 100 (usual was 120).  From last office visit note, 2. Class 3 severe obesity due to excess calories with serious comorbidity and body mass index (BMI) of 50.0 to 59.9 in adult (HCC)  - detailed discussion about diet. Monitor for low carb diet  - Also encouraged exercise. She agrees to start walking in the home.   - continue metformin  - add trulicity once a week injection  - side effects reviewed  If calling patient directly, will need .

## 2024-07-09 ENCOUNTER — APPOINTMENT (OUTPATIENT)
Dept: CT IMAGING | Facility: HOSPITAL | Age: 59
End: 2024-07-09
Attending: EMERGENCY MEDICINE
Payer: MEDICAID

## 2024-07-09 ENCOUNTER — NURSE TRIAGE (OUTPATIENT)
Dept: INTERNAL MEDICINE CLINIC | Facility: CLINIC | Age: 59
End: 2024-07-09

## 2024-07-09 ENCOUNTER — HOSPITAL ENCOUNTER (EMERGENCY)
Facility: HOSPITAL | Age: 59
Discharge: HOME | End: 2024-07-09
Attending: EMERGENCY MEDICINE
Payer: MEDICAID

## 2024-07-09 VITALS
SYSTOLIC BLOOD PRESSURE: 112 MMHG | RESPIRATION RATE: 18 BRPM | TEMPERATURE: 98 F | OXYGEN SATURATION: 95 % | HEART RATE: 67 BPM | DIASTOLIC BLOOD PRESSURE: 67 MMHG

## 2024-07-09 DIAGNOSIS — R19.7 NAUSEA VOMITING AND DIARRHEA: ICD-10-CM

## 2024-07-09 DIAGNOSIS — R11.2 NAUSEA VOMITING AND DIARRHEA: ICD-10-CM

## 2024-07-09 DIAGNOSIS — R51.9 NONINTRACTABLE HEADACHE, UNSPECIFIED CHRONICITY PATTERN, UNSPECIFIED HEADACHE TYPE: Primary | ICD-10-CM

## 2024-07-09 LAB
ALBUMIN SERPL-MCNC: 4.6 G/DL (ref 3.2–4.8)
ALBUMIN/GLOB SERPL: 1.4 {RATIO} (ref 1–2)
ALP LIVER SERPL-CCNC: 89 U/L
ALT SERPL-CCNC: 84 U/L
ANION GAP SERPL CALC-SCNC: 4 MMOL/L (ref 0–18)
AST SERPL-CCNC: 45 U/L (ref ?–34)
BASOPHILS # BLD AUTO: 0.04 X10(3) UL (ref 0–0.2)
BASOPHILS NFR BLD AUTO: 0.5 %
BILIRUB SERPL-MCNC: 0.7 MG/DL (ref 0.3–1.2)
BUN BLD-MCNC: 12 MG/DL (ref 9–23)
BUN/CREAT SERPL: 17.1 (ref 10–20)
CALCIUM BLD-MCNC: 10 MG/DL (ref 8.7–10.4)
CHLORIDE SERPL-SCNC: 107 MMOL/L (ref 98–112)
CO2 SERPL-SCNC: 30 MMOL/L (ref 21–32)
CREAT BLD-MCNC: 0.7 MG/DL
DEPRECATED RDW RBC AUTO: 44.2 FL (ref 35.1–46.3)
EGFRCR SERPLBLD CKD-EPI 2021: 100 ML/MIN/1.73M2 (ref 60–?)
EOSINOPHIL # BLD AUTO: 0.22 X10(3) UL (ref 0–0.7)
EOSINOPHIL NFR BLD AUTO: 2.8 %
ERYTHROCYTE [DISTWIDTH] IN BLOOD BY AUTOMATED COUNT: 14.3 % (ref 11–15)
GLOBULIN PLAS-MCNC: 3.3 G/DL (ref 2–3.5)
GLUCOSE BLD-MCNC: 87 MG/DL (ref 70–99)
HCT VFR BLD AUTO: 40.3 %
HGB BLD-MCNC: 13.1 G/DL
IMM GRANULOCYTES # BLD AUTO: 0.01 X10(3) UL (ref 0–1)
IMM GRANULOCYTES NFR BLD: 0.1 %
LYMPHOCYTES # BLD AUTO: 2.77 X10(3) UL (ref 1–4)
LYMPHOCYTES NFR BLD AUTO: 35.1 %
MCH RBC QN AUTO: 27.5 PG (ref 26–34)
MCHC RBC AUTO-ENTMCNC: 32.5 G/DL (ref 31–37)
MCV RBC AUTO: 84.7 FL
MONOCYTES # BLD AUTO: 0.71 X10(3) UL (ref 0.1–1)
MONOCYTES NFR BLD AUTO: 9 %
NEUTROPHILS # BLD AUTO: 4.15 X10 (3) UL (ref 1.5–7.7)
NEUTROPHILS # BLD AUTO: 4.15 X10(3) UL (ref 1.5–7.7)
NEUTROPHILS NFR BLD AUTO: 52.5 %
OSMOLALITY SERPL CALC.SUM OF ELEC: 291 MOSM/KG (ref 275–295)
PLATELET # BLD AUTO: 204 10(3)UL (ref 150–450)
POTASSIUM SERPL-SCNC: 4.2 MMOL/L (ref 3.5–5.1)
PROT SERPL-MCNC: 7.9 G/DL (ref 5.7–8.2)
RBC # BLD AUTO: 4.76 X10(6)UL
SODIUM SERPL-SCNC: 141 MMOL/L (ref 136–145)
WBC # BLD AUTO: 7.9 X10(3) UL (ref 4–11)

## 2024-07-09 PROCEDURE — 96374 THER/PROPH/DIAG INJ IV PUSH: CPT

## 2024-07-09 PROCEDURE — 70450 CT HEAD/BRAIN W/O DYE: CPT | Performed by: EMERGENCY MEDICINE

## 2024-07-09 PROCEDURE — 96375 TX/PRO/DX INJ NEW DRUG ADDON: CPT

## 2024-07-09 PROCEDURE — 96361 HYDRATE IV INFUSION ADD-ON: CPT

## 2024-07-09 PROCEDURE — 80053 COMPREHEN METABOLIC PANEL: CPT

## 2024-07-09 PROCEDURE — 99285 EMERGENCY DEPT VISIT HI MDM: CPT

## 2024-07-09 PROCEDURE — 85025 COMPLETE CBC W/AUTO DIFF WBC: CPT | Performed by: EMERGENCY MEDICINE

## 2024-07-09 PROCEDURE — 85025 COMPLETE CBC W/AUTO DIFF WBC: CPT

## 2024-07-09 PROCEDURE — 99284 EMERGENCY DEPT VISIT MOD MDM: CPT

## 2024-07-09 PROCEDURE — 80053 COMPREHEN METABOLIC PANEL: CPT | Performed by: EMERGENCY MEDICINE

## 2024-07-09 RX ORDER — KETOROLAC TROMETHAMINE 15 MG/ML
15 INJECTION, SOLUTION INTRAMUSCULAR; INTRAVENOUS ONCE
Status: COMPLETED | OUTPATIENT
Start: 2024-07-09 | End: 2024-07-09

## 2024-07-09 RX ORDER — PROCHLORPERAZINE MALEATE 10 MG
10 TABLET ORAL EVERY 6 HOURS PRN
Qty: 20 TABLET | Refills: 0 | Status: SHIPPED | OUTPATIENT
Start: 2024-07-09 | End: 2024-07-14

## 2024-07-09 RX ORDER — KETOROLAC TROMETHAMINE 10 MG/1
10 TABLET, FILM COATED ORAL EVERY 6 HOURS PRN
Qty: 20 TABLET | Refills: 0 | Status: SHIPPED | OUTPATIENT
Start: 2024-07-09 | End: 2024-07-14

## 2024-07-09 RX ORDER — DIPHENHYDRAMINE HYDROCHLORIDE 50 MG/ML
25 INJECTION INTRAMUSCULAR; INTRAVENOUS ONCE
Status: COMPLETED | OUTPATIENT
Start: 2024-07-09 | End: 2024-07-09

## 2024-07-09 RX ORDER — PROCHLORPERAZINE EDISYLATE 5 MG/ML
5 INJECTION INTRAMUSCULAR; INTRAVENOUS ONCE
Status: COMPLETED | OUTPATIENT
Start: 2024-07-09 | End: 2024-07-09

## 2024-07-09 RX ORDER — METOCLOPRAMIDE HYDROCHLORIDE 5 MG/ML
10 INJECTION INTRAMUSCULAR; INTRAVENOUS ONCE
Status: COMPLETED | OUTPATIENT
Start: 2024-07-09 | End: 2024-07-09

## 2024-07-09 NOTE — TELEPHONE ENCOUNTER
Action Requested: Summary for Provider     []  Critical Lab, Recommendations Needed  [] Need Additional Advice  []   FYI    []   Need Orders  [] Need Medications Sent to Pharmacy  []  Other     SUMMARY:  ID [id number] Cyndi I called patient after dtr in law called us reporting high b/p readings. Patient having nausea, headache and earlier today b/p reading on home meter was 167/146 - she did check again and it was 139/93.   She is able to get up and walk around ok.     She did start Trulicity last week, she took second dose yesterday and since unable to eat, drink much. No CP or SOB at this time.     Patient taking b/p medications  Dtr arrived while I was on phone with the patient, she will take her to ER for eval now. May go to [facility] since closer but will consider [facility] if safe to transport that far.     Reason for call: Blood Pressure  Onset: today       Patient dtr in law calling for patient.   She is not with the patient so unable to fully triage patient  reporting b/p today is 167/146.  She has no other information for me at this time but is asking for meds to help with this   I advised if b/p 167/146 she needs to go to ER now. Melvina is going to call the patient.     I called and spoke with the patient      Started Trulicity last week, this week second dose yesterday - since unable to eat or drink fluids,   Nausea         Reason for Disposition   Systolic BP >= 160 OR Diastolic >= 100, and any cardiac (e.g., breathing difficulty, chest pain) or neurologic symptoms (e.g., new-onset blurred or double vision)    Protocols used: Blood Pressure - High-A-OH

## 2024-07-09 NOTE — ED INITIAL ASSESSMENT (HPI)
S: pt presents to ed with nausea and headache, that patient believes is related to her trulicity.

## 2024-07-10 NOTE — ED PROVIDER NOTES
Patient Seen in: [facility] Emergency Department    History     Chief Complaint   Patient presents with    Nausea    Headache     Stated Complaint: Headaches,nausea     HPI    59-year-old female with past medical history of hypertension, obesity for which patient recently started on Trulicity presenting for evaluation with ongoing nausea in setting of now resolved diarrhea since initiation of Trulicity 1 week ago, now with complaints of headache this morning.  No vision loss or focal weakness/paresthesias.  No photophobia/phonophobia. 4/20/2024 CMP with AST/ALT of 76/95.    Past Medical History:    Amenorrhea    Anxiety    High blood pressure    Hypertension    Shortness of breath    Sleep apnea    Varicose vein       No past surgical history on file.         No family history on file.    Social History     Socioeconomic History    Marital status:    Tobacco Use    Smoking status: Never    Smokeless tobacco: Never   Vaping Use    Vaping status: Never Used   Substance and Sexual Activity    Alcohol use: Not Currently    Drug use: Never    Sexual activity: Not Currently   Other Topics Concern    Caffeine Concern No    Exercise No    Seat Belt No    Special Diet No    Stress Concern No    Weight Concern Yes     Social Determinants of Health     Food Insecurity: Low Risk  (4/20/2024)    Received from [facility]    Food Insecurity     Have there been times that your food ran out, and you didn't have money to get more?: No     Are there times that you worry that this might happen?: No   Transportation Needs: Low Risk  (4/20/2024)    Received from [facility]    Transportation Needs     Do you have trouble getting transportation to medical appointments?: No       Review of Systems :  Constitutional: As per HPI  Gastrointestinal: (+) nausea/vomiting/diarrhea.  Neurological: Negative for syncope; (+) headaches.     Positive for stated complaint:  Headaches,nausea  Other systems are as noted in HPI.  Constitutional and vital signs reviewed.      All other systems reviewed and negative except as noted above.    PSFH elements reviewed from today and agreed except as otherwise stated in HPI.    Physical Exam     ED Triage Vitals   BP 07/09/24 1742 131/90   Pulse 07/09/24 1742 81   Resp 07/09/24 1742 20   Temp 07/09/24 1743 98.1 °F (36.7 °C)   Temp src 07/09/24 1743 Oral   SpO2 07/09/24 1742 94 %   O2 Device 07/09/24 1742 None (Room air)       Current:/90   Pulse 81   Temp 98.1 °F (36.7 °C) (Oral)   Resp 20   SpO2 94%         Physical Exam   Constitutional: No distress.   HEENT: MMM.  Head: Normocephalic. Atraumatic.  Eyes: No injection. No photophobia.  Neck: Neck supple. No meningismus.  Cardiovascular: RRR.   Pulmonary/Chest: Effort normal. CTAB.  Abdominal: Soft. Nontender.  Musculoskeletal: No gross deformity.  Neurological: Alert. CN II-XII grossly intact. BUE/BLE proximally and distally with 5/5 strength.  Skin: Skin is warm.   Psychiatric: Cooperative.  Nursing note and vitals reviewed.        ED Course     Labs Reviewed   COMP METABOLIC PANEL (14) - Abnormal; Notable for the following components:       Result Value    ALT 84 (*)     AST 45 (*)     All other components within normal limits   CBC WITH DIFFERENTIAL WITH PLATELET    Narrative:     The following orders were created for panel order CBC With Differential With Platelet.  Procedure                               Abnormality         Status                     ---------                               -----------         ------                     CBC W/ DIFFERENTIAL[[id number]]                              Final result                 Please view results for these tests on the individual orders.   RAINBOW DRAW LAVENDER   RAINBOW DRAW LIGHT GREEN   RAINBOW DRAW BLUE   RAINBOW DRAW GOLD   CBC W/ DIFFERENTIAL     CT BRAIN OR HEAD (86544)    Result Date: 7/9/2024  4PROCEDURE: CT BRAIN OR HEAD  (CPT=70450)  COMPARISON: [facility], CT BRAIN OR HEAD (CPT=70450), 8/31/2023, 9:15 PM.  INDICATIONS: Headaches, nausea  TECHNIQUE: CT images were obtained without contrast material.  Automated exposure control for dose reduction was used.  Dose information is transmitted to the ACR (American College of Radiology) NRDR (National Radiology Data Registry) which includes the Dose Index Registry.  FINDINGS:  CSF SPACES: The ventricles, cisterns, and sulci are commensurate in caliber and appropriate for age. No hydrocephalus, subarachnoid hemorrhage, or effacement of the basal cisterns is appreciated. There is no extra-axial fluid collection. CEREBRUM: No acute intraparenchymal hemorrhage, edema, or cortical sulcal effacement is apparent. There is no space-occupying lesion, mass effect, or shift of midline structures. The gray-white matter junction is preserved and bilaterally symmetric in appearance.  CEREBELLUM: No edema, hemorrhage, mass, acute infarction, or significant atrophy.  BRAINSTEM: No edema, hemorrhage, mass, acute infarction, or significant atrophy.  CALVARIUM: There is no apparent depressed fracture, mass, or other significant visible lesion.  SINUSES: Limited views demonstrate no significant mucosal thickening or fluid.  ORBITS: Limited views are grossly unremarkable.  OTHER: Negative.          CONCLUSION:   No acute intracranial abnormality.    Dictated by (CST): Broderick Horn MD on 7/09/2024 at 7:31 PM     Finalized by (CST): Broderick Horn MD on 7/09/2024 at 7:36 PM             ED Course as of 07/09/24 2013  ------------------------------------------------------------  Time: 07/09 1954  Comment: Resting with improving symptoms.       MDM   DIFFERENTIAL DIAGNOSIS: After history and physical exam differential diagnosis includes but is not limited to ICH, electrolyte derangement, anemia.    Pulse ox: 94%:Normal on RA, as independently interpreted by myself    Medical Decision  Making  Evaluation for vomiting/diarrhea associated with headache in neurologically intact patient with reported hypertension prior to arrival and without associated meningismus.  Screening labs and CT nonacute, symptoms improving with medications - stable for discharge with ongoing outpatient followup/symptomatic care.    Problems Addressed:  Nausea vomiting and diarrhea: acute illness or injury  Nonintractable headache, unspecified chronicity pattern, unspecified headache type: acute illness or injury    Amount and/or Complexity of Data Reviewed  Independent Historian: caregiver     Details: Son's fiancee at bedside for collateral history  External Data Reviewed: labs and radiology.     Details: 4/21/2024 CMP, 7/14/2023 CT CAP reviewed  Labs: ordered. Decision-making details documented in ED Course.  Radiology: ordered and independent interpretation performed. Decision-making details documented in ED Course.     Details: CTH without obvious ICH as independently interpreted by myself    Risk  Prescription drug management.      I was wearing at minimum a facemask and eye protection throughout this encounter with handwashing performed prior and after patient evaluation without personal hand/facial/oropharyngeal contact and gloves worn throughout encounter. See note and/or contact this provider for further PPE details.      Disposition and Plan     Clinical Impression:  1. Nonintractable headache, unspecified chronicity pattern, unspecified headache type    2. Nausea vomiting and diarrhea        Disposition:  Discharge    Follow-up:  Estelle Multani MD  [address] [place] [address] [phone]    Call  For followup and re-evaluation.      Medications Prescribed:  Discharge Medication List as of 7/9/2024  9:01 PM        START taking these medications    Details   prochlorperazine (COMPAZINE) 10 mg tablet Take 1 tablet (10 mg total) by mouth every 6 (six) hours as needed for Nausea., Normal, Disp-20 tablet, R-0       Ketorolac Tromethamine 10 MG Oral Tab Take 1 tablet (10 mg total) by mouth every 6 (six) hours as needed for Pain., Normal, Disp-20 tablet, R-0

## 2024-07-10 NOTE — ED QUICK NOTES
Actions and side effects of Toradol and compazine reviewed. Importance of MD follow up reviewed.

## 2024-07-11 ENCOUNTER — OFFICE VISIT (OUTPATIENT)
Dept: INTERNAL MEDICINE CLINIC | Facility: CLINIC | Age: 59
End: 2024-07-11

## 2024-07-11 VITALS
WEIGHT: 293 LBS | DIASTOLIC BLOOD PRESSURE: 79 MMHG | SYSTOLIC BLOOD PRESSURE: 111 MMHG | BODY MASS INDEX: 48.82 KG/M2 | HEART RATE: 84 BPM | HEIGHT: 64.9 IN

## 2024-07-11 DIAGNOSIS — E11.9 CONTROLLED TYPE 2 DIABETES MELLITUS WITHOUT COMPLICATION, WITHOUT LONG-TERM CURRENT USE OF INSULIN (HCC): Primary | ICD-10-CM

## 2024-07-11 DIAGNOSIS — I83.813 VARICOSE VEINS OF LOWER EXTREMITY WITH PAIN, BILATERAL: ICD-10-CM

## 2024-07-11 PROCEDURE — 99214 OFFICE O/P EST MOD 30 MIN: CPT | Performed by: NURSE PRACTITIONER

## 2024-07-11 RX ORDER — SEMAGLUTIDE 0.68 MG/ML
INJECTION, SOLUTION SUBCUTANEOUS
Qty: 1 EACH | Refills: 3 | Status: SHIPPED | OUTPATIENT
Start: 2024-07-11

## 2024-07-11 NOTE — PROGRESS NOTES
Judi White is a 59 year old female.  HPI:   Pt f/u after recent ER visit on 7/09/2024. Pt has a hx of HTN and obesity, on Trulicity. Took her send dose of trulicity and sx started soon after. She reports experiencing nausea and diarrhea. Diarrhea had resolved but then developed HA. Reports no episodes of vision changes, aura, sensory sensitivity.  AST 45 and ALT 84, elevated. PT has a hx of elevated liver enzymes and is scheduled for liver US 7/26/2024 at 730am. Currently patient reports resolution of nausea, vomiting and HA. Denies any new or worsening sx.     CT BRAIN OR HEAD (86283)    Result Date: 7/9/2024  CONCLUSION:   No acute intracranial abnormality.    Dictated by (CST): Broderick Horn MD on 7/09/2024 at 7:31 PM     Finalized by (CST): Broderick Horn MD on 7/09/2024 at 7:36 PM          Current Outpatient Medications   Medication Sig Dispense Refill    semaglutide (OZEMPIC, 0.25 OR 0.5 MG/DOSE,) 2 MG/3ML Subcutaneous Solution Pen-injector Initial: 0.25 mg once weekly for 4 weeks, then increase to 0.5 mg once weekly 1 each 3    prochlorperazine (COMPAZINE) 10 mg tablet Take 1 tablet (10 mg total) by mouth every 6 (six) hours as needed for Nausea. 20 tablet 0    Ketorolac Tromethamine 10 MG Oral Tab Take 1 tablet (10 mg total) by mouth every 6 (six) hours as needed for Pain. 20 tablet 0    ondansetron (ZOFRAN) 4 mg tablet Take 1 tablet (4 mg total) by mouth every 12 (twelve) hours as needed for Nausea. 20 tablet 0    Meloxicam (MOBIC) 15 MG Oral Tab Take 1 tablet (15 mg total) by mouth daily for 14 days. 14 tablet 0    triamcinolone 0.1 % External Cream Apply topically 2 (two) times daily as needed. 60 g 3    Potassium Chloride ER 10 MEQ Oral Tab CR Take 1 tab po daily  together  with  furosemide 90 tablet 0    furosemide 20 MG Oral Tab Take  1 tab po daily 90 tablet 0    ergocalciferol 1.25 MG (57312 UT) Oral Cap Take 1 capsule (50,000 Units total) by mouth once a week. With food for 12 weeks  total then begin OTC Vitamin D at 2000 units daily with food thereafter 12 capsule 0    metFORMIN  MG Oral Tablet 24 Hr Take 1 tablet (750 mg total) by mouth daily. 90 tablet 0    mometasone 0.1 % External Ointment Apply 1 Application topically daily. Apply to entrance of ear canal before sleep for `10-14 days 1 each 0    nortriptyline 25 MG Oral Cap Start with 1 pill QHS, for 1 week, then 2 pills qhs foor another week, then 3 pills qhs 90 capsule 3    metoprolol succinate ER 25 MG Oral Tablet 24 Hr Take 1 tablet (25 mg total) by mouth daily. 90 tablet 1      Past Medical History:    Amenorrhea    Anxiety    High blood pressure    Hypertension    Shortness of breath    Sleep apnea    Varicose vein      Social History:  Social History     Socioeconomic History    Marital status:    Tobacco Use    Smoking status: Never    Smokeless tobacco: Never   Vaping Use    Vaping status: Never Used   Substance and Sexual Activity    Alcohol use: Not Currently    Drug use: Never    Sexual activity: Not Currently   Other Topics Concern    Caffeine Concern No    Exercise No    Seat Belt No    Special Diet No    Stress Concern No    Weight Concern Yes     Social Determinants of Health     Food Insecurity: Low Risk  (4/20/2024)    Received from [facility]    Food Insecurity     Have there been times that your food ran out, and you didn't have money to get more?: No     Are there times that you worry that this might happen?: No   Transportation Needs: Low Risk  (4/20/2024)    Received from [facility]    Transportation Needs     Do you have trouble getting transportation to medical appointments?: No        REVIEW OF SYSTEMS:   Review of Systems   All other systems reviewed and are negative.         EXAM:   /79 (BP Location: Left arm, Patient Position: Sitting, Cuff Size: large)   Pulse 84   Ht 5' 4.9\" (1.648 m)   Wt (!) 312 lb (141.5 kg)   BMI 52.08 kg/m²     Physical  Exam  Vitals reviewed.   Constitutional:       General: She is not in acute distress.     Appearance: Normal appearance. She is obese.   HENT:      Head: Normocephalic.      Mouth/Throat:      Mouth: Mucous membranes are moist.      Pharynx: Oropharynx is clear.   Eyes:      General: No scleral icterus.        Right eye: No discharge.         Left eye: No discharge.      Conjunctiva/sclera: Conjunctivae normal.      Pupils: Pupils are equal, round, and reactive to light.   Cardiovascular:      Rate and Rhythm: Normal rate and regular rhythm.      Pulses: Normal pulses.      Heart sounds: Normal heart sounds.   Pulmonary:      Effort: Pulmonary effort is normal. No respiratory distress.      Breath sounds: Normal breath sounds.   Abdominal:      General: Bowel sounds are normal.      Palpations: Abdomen is soft.      Tenderness: There is no abdominal tenderness.   Musculoskeletal:         General: No swelling.      Comments: Varicosities and telangectasia bilaterally   Skin:     General: Skin is warm.      Coloration: Skin is not jaundiced.      Findings: No erythema or rash.   Neurological:      Mental Status: She is alert and oriented to person, place, and time.   Psychiatric:         Mood and Affect: Mood normal.         Judgment: Judgment normal.            ASSESSMENT AND PLAN:   1. Controlled type 2 diabetes mellitus without complication, without long-term current use of insulin (HCC)  -Pt did not tolerate Trulicity d/t nausea and vomiting.   -Stoptrulicity, sending new Rx for Ozempic. Will need PA, advised to wait 1 week before starting any new medication. Reviewed dose and s/e. Pt denies any personal or family hx of MENS2/MTC.   -Continue f/u with weight loss clinic. Schedule with dietician.  -Pt due for footxam. Defers for next visit  -Referral for eye exam.   -Repeat A1c due, last A1c 6.6%, microalb labs due.   -close f/u in 2 weeks.  - Ophthalmology Referral - In Network  - Microalb/Creat Ratio, Random  Urine [E]; Future  - Hemoglobin A1C [E]; Future    2. Varicose veins of lower extremity with pain, bilateral  - Vascular Surgery - In Network      The patient indicates understanding of these issues and agrees to the plan.  The patient is asked to return in 2 weeks.     The above note was creating using Dragon speech recognition technology. Please excuse any typos.

## 2024-07-13 ENCOUNTER — APPOINTMENT (OUTPATIENT)
Dept: ULTRASOUND IMAGING | Facility: HOSPITAL | Age: 59
End: 2024-07-13
Attending: EMERGENCY MEDICINE
Payer: MEDICAID

## 2024-07-13 ENCOUNTER — APPOINTMENT (OUTPATIENT)
Dept: GENERAL RADIOLOGY | Facility: HOSPITAL | Age: 59
End: 2024-07-13
Attending: EMERGENCY MEDICINE
Payer: MEDICAID

## 2024-07-13 ENCOUNTER — APPOINTMENT (OUTPATIENT)
Dept: CT IMAGING | Facility: HOSPITAL | Age: 59
End: 2024-07-13
Attending: EMERGENCY MEDICINE
Payer: MEDICAID

## 2024-07-13 ENCOUNTER — HOSPITAL ENCOUNTER (EMERGENCY)
Facility: HOSPITAL | Age: 59
Discharge: HOME | End: 2024-07-13
Attending: EMERGENCY MEDICINE
Payer: MEDICAID

## 2024-07-13 VITALS
HEIGHT: 64.96 IN | TEMPERATURE: 98 F | BODY MASS INDEX: 48.82 KG/M2 | SYSTOLIC BLOOD PRESSURE: 110 MMHG | OXYGEN SATURATION: 97 % | RESPIRATION RATE: 18 BRPM | WEIGHT: 293 LBS | HEART RATE: 66 BPM | DIASTOLIC BLOOD PRESSURE: 60 MMHG

## 2024-07-13 DIAGNOSIS — K59.00 CONSTIPATION, UNSPECIFIED CONSTIPATION TYPE: ICD-10-CM

## 2024-07-13 DIAGNOSIS — R10.9 ABDOMINAL PAIN OF UNKNOWN ETIOLOGY: Primary | ICD-10-CM

## 2024-07-13 LAB
ALBUMIN SERPL-MCNC: 4.6 G/DL (ref 3.2–4.8)
ALBUMIN/GLOB SERPL: 1.4 {RATIO} (ref 1–2)
ALP LIVER SERPL-CCNC: 94 U/L
ALT SERPL-CCNC: 101 U/L
ANION GAP SERPL CALC-SCNC: 6 MMOL/L (ref 0–18)
AST SERPL-CCNC: 74 U/L (ref ?–34)
BASOPHILS # BLD AUTO: 0.04 X10(3) UL (ref 0–0.2)
BASOPHILS NFR BLD AUTO: 0.6 %
BILIRUB SERPL-MCNC: 0.5 MG/DL (ref 0.3–1.2)
BILIRUB UR QL: NEGATIVE
BUN BLD-MCNC: 12 MG/DL (ref 9–23)
BUN/CREAT SERPL: 17.6 (ref 10–20)
CALCIUM BLD-MCNC: 9.4 MG/DL (ref 8.7–10.4)
CHLORIDE SERPL-SCNC: 109 MMOL/L (ref 98–112)
CLARITY UR: CLEAR
CO2 SERPL-SCNC: 28 MMOL/L (ref 21–32)
CREAT BLD-MCNC: 0.68 MG/DL
DEPRECATED RDW RBC AUTO: 45 FL (ref 35.1–46.3)
EGFRCR SERPLBLD CKD-EPI 2021: 100 ML/MIN/1.73M2 (ref 60–?)
EOSINOPHIL # BLD AUTO: 0.27 X10(3) UL (ref 0–0.7)
EOSINOPHIL NFR BLD AUTO: 3.8 %
ERYTHROCYTE [DISTWIDTH] IN BLOOD BY AUTOMATED COUNT: 14.6 % (ref 11–15)
GLOBULIN PLAS-MCNC: 3.2 G/DL (ref 2–3.5)
GLUCOSE BLD-MCNC: 97 MG/DL (ref 70–99)
GLUCOSE UR-MCNC: NORMAL MG/DL
HCT VFR BLD AUTO: 39.6 %
HGB BLD-MCNC: 12.6 G/DL
HGB UR QL STRIP.AUTO: NEGATIVE
IMM GRANULOCYTES # BLD AUTO: 0.01 X10(3) UL (ref 0–1)
IMM GRANULOCYTES NFR BLD: 0.1 %
KETONES UR-MCNC: NEGATIVE MG/DL
LEUKOCYTE ESTERASE UR QL STRIP.AUTO: NEGATIVE
LIPASE SERPL-CCNC: 34 U/L (ref 13–75)
LYMPHOCYTES # BLD AUTO: 2.65 X10(3) UL (ref 1–4)
LYMPHOCYTES NFR BLD AUTO: 37.6 %
MCH RBC QN AUTO: 27.1 PG (ref 26–34)
MCHC RBC AUTO-ENTMCNC: 31.8 G/DL (ref 31–37)
MCV RBC AUTO: 85.2 FL
MONOCYTES # BLD AUTO: 0.7 X10(3) UL (ref 0.1–1)
MONOCYTES NFR BLD AUTO: 9.9 %
NEUTROPHILS # BLD AUTO: 3.37 X10 (3) UL (ref 1.5–7.7)
NEUTROPHILS # BLD AUTO: 3.37 X10(3) UL (ref 1.5–7.7)
NEUTROPHILS NFR BLD AUTO: 48 %
NITRITE UR QL STRIP.AUTO: NEGATIVE
OSMOLALITY SERPL CALC.SUM OF ELEC: 296 MOSM/KG (ref 275–295)
PH UR: 7.5 [PH] (ref 5–8)
PLATELET # BLD AUTO: 217 10(3)UL (ref 150–450)
POTASSIUM SERPL-SCNC: 4.2 MMOL/L (ref 3.5–5.1)
PROT SERPL-MCNC: 7.8 G/DL (ref 5.7–8.2)
PROT UR-MCNC: NEGATIVE MG/DL
RBC # BLD AUTO: 4.65 X10(6)UL
SODIUM SERPL-SCNC: 143 MMOL/L (ref 136–145)
SP GR UR STRIP: 1.02 (ref 1–1.03)
TROPONIN I SERPL HS-MCNC: 3 NG/L
UROBILINOGEN UR STRIP-ACNC: NORMAL
WBC # BLD AUTO: 7 X10(3) UL (ref 4–11)

## 2024-07-13 PROCEDURE — 83690 ASSAY OF LIPASE: CPT | Performed by: EMERGENCY MEDICINE

## 2024-07-13 PROCEDURE — 84484 ASSAY OF TROPONIN QUANT: CPT | Performed by: EMERGENCY MEDICINE

## 2024-07-13 PROCEDURE — 99285 EMERGENCY DEPT VISIT HI MDM: CPT

## 2024-07-13 PROCEDURE — 76705 ECHO EXAM OF ABDOMEN: CPT | Performed by: EMERGENCY MEDICINE

## 2024-07-13 PROCEDURE — 96375 TX/PRO/DX INJ NEW DRUG ADDON: CPT

## 2024-07-13 PROCEDURE — 96361 HYDRATE IV INFUSION ADD-ON: CPT

## 2024-07-13 PROCEDURE — 80053 COMPREHEN METABOLIC PANEL: CPT | Performed by: EMERGENCY MEDICINE

## 2024-07-13 PROCEDURE — 96374 THER/PROPH/DIAG INJ IV PUSH: CPT

## 2024-07-13 PROCEDURE — 93010 ELECTROCARDIOGRAM REPORT: CPT

## 2024-07-13 PROCEDURE — 85025 COMPLETE CBC W/AUTO DIFF WBC: CPT | Performed by: EMERGENCY MEDICINE

## 2024-07-13 PROCEDURE — 74177 CT ABD & PELVIS W/CONTRAST: CPT | Performed by: EMERGENCY MEDICINE

## 2024-07-13 PROCEDURE — 71045 X-RAY EXAM CHEST 1 VIEW: CPT | Performed by: EMERGENCY MEDICINE

## 2024-07-13 PROCEDURE — 93005 ELECTROCARDIOGRAM TRACING: CPT

## 2024-07-13 PROCEDURE — 81003 URINALYSIS AUTO W/O SCOPE: CPT | Performed by: EMERGENCY MEDICINE

## 2024-07-13 RX ORDER — BISACODYL 5 MG/1
5 TABLET, DELAYED RELEASE ORAL 2 TIMES DAILY PRN
Qty: 14 TABLET | Refills: 0 | Status: SHIPPED | OUTPATIENT
Start: 2024-07-13 | End: 2024-07-20

## 2024-07-13 RX ORDER — PRAMOXINE HYDROCHLORIDE HYDROCORTISONE ACETATE 100; 100 MG/10G; MG/10G
1 AEROSOL, FOAM TOPICAL 2 TIMES DAILY
Qty: 1 EACH | Refills: 0 | Status: SHIPPED | OUTPATIENT
Start: 2024-07-13 | End: 2024-07-18

## 2024-07-13 RX ORDER — ONDANSETRON 2 MG/ML
4 INJECTION INTRAMUSCULAR; INTRAVENOUS ONCE
Status: COMPLETED | OUTPATIENT
Start: 2024-07-13 | End: 2024-07-13

## 2024-07-13 NOTE — ED INITIAL ASSESSMENT (HPI)
Pt presents to the ER with c/o mid upper and RUQ  pain. Also c/o nausea. Pt stated she was also constipated but was able to go today. Reports seeing Bright red blood with stool. H/o hemorrhoids

## 2024-07-13 NOTE — ED PROVIDER NOTES
Patient Seen in: [facility] Emergency Department      History     Chief Complaint   Patient presents with    Abdomen/Flank Pain     Stated Complaint: Abdominal Pain    Subjective:   HPI    Patient presents emergency department complaining of abdominal pain.  Family acts as 's and provides history.  They state that she has had pain for the last few days more in the epigastric and right-sided abdomen.  There is nausea but no vomiting.  There is no fever.  She noticed some blood in her stool today.  She was constipated but had a bowel movement today.  She also had some heartburn but describes no chest pain.  There is no other aggravating or alleviating factors.    Objective:   Past Medical History:    Amenorrhea    Anxiety    High blood pressure    Hypertension    Shortness of breath    Sleep apnea    Varicose vein              History reviewed. No pertinent surgical history.             Social History     Socioeconomic History    Marital status:    Tobacco Use    Smoking status: Never    Smokeless tobacco: Never   Vaping Use    Vaping status: Never Used   Substance and Sexual Activity    Alcohol use: Not Currently    Drug use: Never    Sexual activity: Not Currently   Other Topics Concern    Caffeine Concern No    Exercise No    Seat Belt No    Special Diet No    Stress Concern No    Weight Concern Yes     Social Determinants of Health     Food Insecurity: Low Risk  (4/20/2024)    Received from [facility]    Food Insecurity     Have there been times that your food ran out, and you didn't have money to get more?: No     Are there times that you worry that this might happen?: No   Transportation Needs: Low Risk  (4/20/2024)    Received from [facility]    Transportation Needs     Do you have trouble getting transportation to medical appointments?: No              Review of Systems    Positive for stated Chief Complaint: Abdomen/Flank Pain    Other  systems are as noted in HPI.  Constitutional and vital signs reviewed.      All other systems reviewed and negative except as noted above.    Physical Exam     ED Triage Vitals [07/13/24 1523]   /76   Pulse 77   Resp 18   Temp 98 °F (36.7 °C)   Temp src Oral   SpO2 95 %   O2 Device None (Room air)       Current Vitals:   Vital Signs  BP: 110/60  Pulse: 66  Resp: 18  Temp: 98 °F (36.7 °C)  Temp src: Oral  MAP (mmHg): 75    Oxygen Therapy  SpO2: 97 %  O2 Device: None (Room air)            Physical Exam  Vitals and nursing note reviewed.   Constitutional:       General: She is not in acute distress.     Appearance: She is well-developed.   HENT:      Head: Normocephalic.      Nose: Nose normal.      Mouth/Throat:      Mouth: Mucous membranes are moist.   Eyes:      Conjunctiva/sclera: Conjunctivae normal.   Cardiovascular:      Rate and Rhythm: Normal rate and regular rhythm.      Heart sounds: No murmur heard.  Pulmonary:      Effort: Pulmonary effort is normal. No respiratory distress.      Breath sounds: Normal breath sounds.   Abdominal:      General: There is no distension.      Palpations: Abdomen is soft.      Tenderness: There is abdominal tenderness in the right upper quadrant, right lower quadrant and epigastric area. There is no guarding or rebound.   Genitourinary:     Rectum: Normal. Guaiac result negative.   Musculoskeletal:         General: No tenderness. Normal range of motion.      Cervical back: Normal range of motion and neck supple.   Skin:     General: Skin is warm and dry.      Findings: No rash.   Neurological:      Mental Status: She is alert and oriented to person, place, and time.               ED Course     Labs Reviewed   COMP METABOLIC PANEL (14) - Abnormal; Notable for the following components:       Result Value    Calculated Osmolality 296 (*)      (*)     AST 74 (*)     All other components within normal limits   LIPASE - Normal   TROPONIN I HIGH SENSITIVITY - Normal    URINALYSIS WITH CULTURE REFLEX   CBC WITH DIFFERENTIAL WITH PLATELET    Narrative:     The following orders were created for panel order CBC With Differential With Platelet.  Procedure                               Abnormality         Status                     ---------                               -----------         ------                     CBC W/ DIFFERENTIAL[[id number]]                              Final result                 Please view results for these tests on the individual orders.   RAINBOW DRAW LAVENDER   RAINBOW DRAW LIGHT GREEN   RAINBOW DRAW BLUE   CBC W/ DIFFERENTIAL     EKG    Rate, intervals and axes as noted on EKG Report.  Rate: 71 bpm  Rhythm: Sinus Rhythm  Reading: Nonspecific ST and T wave changes, abnormal                          MDM                        Medical Decision Making  Differential diagnosis considered for reflux, medication side effect, bowel obstruction, pancreatitis, cholelithiasis, biliary colic, kidney stone, diverticulitis, appendicitis.    Problems Addressed:  Abdominal pain of unknown etiology: acute illness or injury  Constipation, unspecified constipation type: acute illness or injury    Amount and/or Complexity of Data Reviewed  Labs: ordered. Decision-making details documented in ED Course.     Details: Labs unremarkable with exception of very mildly elevated liver function tests of unclear significance.  Radiology: ordered and independent interpretation performed. Decision-making details documented in ED Course.     Details: Ultrasound negative for gallstones or cholecystitis.  CT scan shows no acute intra-abdominal process.  There is constipation present.  ECG/medicine tests: ordered and independent interpretation performed. Decision-making details documented in ED Course.  Discussion of management or test interpretation with external provider(s): Treat with MiraLAX and Dulcolax and have patient follow-up with primary care physician.  Drink plenty of fluids.   Return for worsening condition.    Risk  Prescription drug management.        Disposition and Plan     Clinical Impression:  1. Abdominal pain of unknown etiology    2. Constipation, unspecified constipation type         Disposition:  Discharge  7/13/2024  7:40 pm    Follow-up:  Chester Garduno MD  [address] [address]  [phone]    Schedule an appointment as soon as possible for a visit            Medications Prescribed:  Current Discharge Medication List        START taking these medications    Details   hydrocortisone-pramoxine perianal (PROCTOFOAM HC) 1-1 % External Foam Place 1 applicator rectally 2 (two) times daily for 5 days.  Qty: 1 each, Refills: 0    Associated Diagnoses: Constipation, unspecified constipation type      bisacodyl 5 MG Oral Tab EC Take 1 tablet (5 mg total) by mouth 2 (two) times daily as needed for constipation.  Qty: 14 tablet, Refills: 0

## 2024-07-14 LAB
ATRIAL RATE: 71 BPM
P-R INTERVAL: 202 MS
Q-T INTERVAL: 376 MS
QRS DURATION: 84 MS
QTC CALCULATION (BEZET): 408 MS
R AXIS: 208 DEGREES
T AXIS: 169 DEGREES
VENTRICULAR RATE: 71 BPM

## 2024-07-14 NOTE — DISCHARGE INSTRUCTIONS
Miralax OTC, 1 cap full twice daily for next 3 days then decrease to once a day. Continue Omeprazole. Use Dulcolax 1 tablet twice daily as prescribed. Stop if develop diarrrhea. Proctofoam as prescribed..

## 2024-07-15 ENCOUNTER — TELEPHONE (OUTPATIENT)
Dept: PHYSICAL MEDICINE AND REHAB | Facility: CLINIC | Age: 59
End: 2024-07-15

## 2024-07-15 ENCOUNTER — TELEPHONE (OUTPATIENT)
Facility: CLINIC | Age: 59
End: 2024-07-15

## 2024-07-15 NOTE — TELEPHONE ENCOUNTER
Spoke with patient's friend Melvina who stated Physical therapy location that was recommended by  did not accept patient's insurance.     They called patient's insurance and received a list of PT locations that accepted patient's insurance. Patient is wanting to use [facility] and asked for PT order by faxed to fax #: [phone].    PT order faxed via [employer] to [facility] fax #:[phone].    Nothing further needed at this time.

## 2024-07-15 NOTE — TELEPHONE ENCOUNTER
Patient's friend states patient had ultrasound done at [facility] over the weekend.  Can Dr. Mayer review these results?  Please call.

## 2024-07-15 NOTE — TELEPHONE ENCOUNTER
I spoke to the pt's daughter in law Melvina    I reviewed Dr Mayer's recommendations below  with her    I canceled the appt for the liver us and I cancelled the order in [facility]

## 2024-07-15 NOTE — TELEPHONE ENCOUNTER
CT A/P and US gallbladder were unremarkable.    She has hepatic steatosis on imaging, everything else with the liver was fine.    She can cancer the US of the liver.    She had stool in the colon; everything else was fine.     Recent EGD also negative    Can try miralax to improve her bowel habits or increase hydration and consumption of kiwi fruit.

## 2024-07-15 NOTE — TELEPHONE ENCOUNTER
Dr Mayer,    Pt has the liver us scheduled for 07/26/2024    Pt was in ER on 07/13/2024 for abdominal pain and had an US of the gallbladder done

## 2024-07-16 ENCOUNTER — TELEPHONE (OUTPATIENT)
Dept: INTERNAL MEDICINE CLINIC | Facility: CLINIC | Age: 59
End: 2024-07-16

## 2024-07-16 RX ORDER — SEMAGLUTIDE 0.68 MG/ML
INJECTION, SOLUTION SUBCUTANEOUS
Qty: 3 ML | Refills: 3 | Status: CANCELLED | OUTPATIENT
Start: 2024-07-16

## 2024-07-16 NOTE — TELEPHONE ENCOUNTER
Spoke to patient in [place], she states that she reports massive rectal bleeding happened few days ago was passing black blood and later bright red blood, was seen in the emergency room hemoglobin was normal at that time.  Bleeding slowed down still wipes every day blood.  Patient states that she is bothered by constipation's which are getting worse, eating very little, feels nauseous, walks regularly.  Appointment with gastroenterology scheduled for September.  Can you see this patient as soon as possible I believe she needs colonoscopy as soon as possible.  Patient is ready to see any available provider

## 2024-07-16 NOTE — TELEPHONE ENCOUNTER
I would prefer if we can get her in sooner with GI/general surgery. Any way we can expedite the appt?   In the interim if bleeding is worsening or no improvement, go to ER.   I will send Rx for hydrocortisone 2.5%

## 2024-07-16 NOTE — TELEPHONE ENCOUNTER
Called patient with [place]  #[phone] .    Advised of providers recommendations and appointment made.    Patient states that her insurance would not cover the Anusol. Pt declined to try to use any OTC medications until appointment at this time.     Future Appointments   Date Time Provider Department Center   7/17/2024  9:00 AM Kelsi Madrigal APRN EEMGVS EC [facility]   7/17/2024 11:45 AM Eyad Gonzalez MD ECSCHDERM EC [facility]   7/20/2024  9:00 AM Chata Leigh APRN ECLMBIM2 EC [place]   7/23/2024  3:00 PM Kenisha Salinas APRN ECCFHGASTRO [facility]   7/31/2024  1:00 PM Kael Tolentino RD EMGWEI EM[facility] [address]   8/1/2024  8:00 AM Chantel De La Rosa DO PM&R [place] [facility]   8/3/2024  8:20 AM LMB SOCORRO RM1 LMB SOCORRO EM [place]   8/3/2024 10:15 AM LMB US RM1 LMB US EM [place]   8/9/2024 10:40 AM Alannah Jackson PA-C EEMGWLCPL EM[facility] [address]   9/18/2024  3:00 PM Kenisha Salinas APRN ECOPOGASTRO EC [facility] to provider.

## 2024-07-16 NOTE — TELEPHONE ENCOUNTER
Dtr Jackie calling, on SISI -she requests we call patient back with      She is calling because the procotofoam is not covered and they are asking what else could be sent in. Dtr also states that the pharmacy does not have proctofoam in stock    Advised can try OTC prep H suppositories for now since she has not tried these.   Patient still having on and off blood rectally also having rectal pain, evaluated in ER 7/13/24 and advised to follow up with GI    Patient dtr has called but cannot get appt until Saturday.   Patient has having BM easier now, she has been taking OTC meds to help with this and it's been \"better\" per dtr.     Patient dtr asking if Md can make recommendations of something prescription to give in place of proctofoam and if GI appt can be moved up.    I did schedule patient for ER follow up for Saturday with EMANUEL Leigh    Future Appointments   Date Time Provider Department Center   7/17/2024  9:00 AM Kelsi Madrigal APRN EEMGVS [facility]   7/17/2024 11:45 AM Eyad Gonzalez MD ECSCHDERM  [facility]   7/20/2024  9:00 AM Chata Leigh APRN ECLMBIM2 [facility] [place]   7/31/2024  1:00 PM Kael Tolentino RD EMGWEI EMG [address]   8/1/2024  8:00 AM Chantel De La Rosa DO PM&R [place] [facility] [place]   8/3/2024  8:20 AM LMB SOCORRO RM1 LMB SOCORRO EM [place]   8/3/2024 10:15 AM LMB US RM1 LMB US EM [place]   8/9/2024 10:40 AM Alannah Jackson PA-C EEMGWLCPL EMG [address]   9/18/2024  3:00 PM Kenisha Salinas APRN ECOPOGASTFAY [facility]

## 2024-07-16 NOTE — TELEPHONE ENCOUNTER
Spoke to patient, in Turkish, see another communication, I sent message to GI staff asking patient to be seen as soon as possible she requires colonoscopy.  I do not believe topical treatment will make huge difference

## 2024-07-16 NOTE — TELEPHONE ENCOUNTER
Pt currently scheduled in sept.   Dr. Multani requesting earlier visit (see below)    Pt seen by Dr. Mayer in clinic 11/2023  Denied brbpr at tov  No fhx gi malignancy  No previous c-scope  High risk given bmi >50  Plan for non-invasive crc screening    Had neg cologuard 12/9/23    Reviewed w/U in ED 7/13/24:  Moderate colonic stool burden on ct a/p   Not anemic on cbc, however noted hgb down slightly from overall trend  BUN/crt normal      Nursing:  Okay to use res 24  Advise use of miralax in am and titrate to desired effect  Fibercon or citrucel in evening  Anusol for empiric treatment of hemorrhoids (prescribed in ed)    Er if condition decline  Please let her know above and contact pt with appt date/time    Thanks,  C

## 2024-07-16 NOTE — TELEPHONE ENCOUNTER
Patients daughter called regarding the following medication:    semaglutide (OZEMPIC, 0.25 OR 0.5 MG/DOSE,) 2 MG/3ML Subcutaneous Solution Pen-injector     Per daughter patient needs approval from . I believe she is requesting a prior authorization.

## 2024-07-17 ENCOUNTER — LAB ENCOUNTER (OUTPATIENT)
Dept: LAB | Age: 59
End: 2024-07-17
Attending: STUDENT IN AN ORGANIZED HEALTH CARE EDUCATION/TRAINING PROGRAM
Payer: MEDICAID

## 2024-07-17 ENCOUNTER — OFFICE VISIT (OUTPATIENT)
Facility: CLINIC | Age: 59
End: 2024-07-17
Payer: MEDICAID

## 2024-07-17 ENCOUNTER — OFFICE VISIT (OUTPATIENT)
Dept: DERMATOLOGY CLINIC | Facility: CLINIC | Age: 59
End: 2024-07-17
Payer: MEDICAID

## 2024-07-17 VITALS — BODY MASS INDEX: 48.82 KG/M2 | WEIGHT: 293 LBS | RESPIRATION RATE: 20 BRPM | HEIGHT: 64.96 IN

## 2024-07-17 DIAGNOSIS — R74.8 ELEVATED LIVER ENZYMES: ICD-10-CM

## 2024-07-17 DIAGNOSIS — Z51.81 MEDICATION MONITORING ENCOUNTER: ICD-10-CM

## 2024-07-17 DIAGNOSIS — Z51.81 MEDICATION MONITORING ENCOUNTER: Primary | ICD-10-CM

## 2024-07-17 DIAGNOSIS — I83.813 VARICOSE VEINS OF BILATERAL LOWER EXTREMITIES WITH PAIN: Primary | ICD-10-CM

## 2024-07-17 DIAGNOSIS — E11.9 CONTROLLED TYPE 2 DIABETES MELLITUS WITHOUT COMPLICATION, WITHOUT LONG-TERM CURRENT USE OF INSULIN (HCC): ICD-10-CM

## 2024-07-17 DIAGNOSIS — L63.9 ALOPECIA AREATA: ICD-10-CM

## 2024-07-17 LAB
CREAT UR-SCNC: 146.2 MG/DL
EST. AVERAGE GLUCOSE BLD GHB EST-MCNC: 140 MG/DL (ref 68–126)
HBA1C MFR BLD: 6.5 % (ref ?–5.7)
MICROALBUMIN UR-MCNC: 0.7 MG/DL
MICROALBUMIN/CREAT 24H UR-RTO: 4.8 UG/MG (ref ?–30)
TSI SER-ACNC: 2.15 MIU/ML (ref 0.55–4.78)
VIT D+METAB SERPL-MCNC: 38.1 NG/ML (ref 30–100)

## 2024-07-17 PROCEDURE — 86803 HEPATITIS C AB TEST: CPT

## 2024-07-17 PROCEDURE — 36415 COLL VENOUS BLD VENIPUNCTURE: CPT

## 2024-07-17 PROCEDURE — 82728 ASSAY OF FERRITIN: CPT

## 2024-07-17 PROCEDURE — 83036 HEMOGLOBIN GLYCOSYLATED A1C: CPT

## 2024-07-17 PROCEDURE — 84466 ASSAY OF TRANSFERRIN: CPT

## 2024-07-17 PROCEDURE — 99204 OFFICE O/P NEW MOD 45 MIN: CPT | Performed by: STUDENT IN AN ORGANIZED HEALTH CARE EDUCATION/TRAINING PROGRAM

## 2024-07-17 PROCEDURE — 86708 HEPATITIS A ANTIBODY: CPT

## 2024-07-17 PROCEDURE — 82306 VITAMIN D 25 HYDROXY: CPT

## 2024-07-17 PROCEDURE — 11900 INJECT SKIN LESIONS </W 7: CPT | Performed by: STUDENT IN AN ORGANIZED HEALTH CARE EDUCATION/TRAINING PROGRAM

## 2024-07-17 PROCEDURE — 82570 ASSAY OF URINE CREATININE: CPT

## 2024-07-17 PROCEDURE — 87340 HEPATITIS B SURFACE AG IA: CPT

## 2024-07-17 PROCEDURE — 84165 PROTEIN E-PHORESIS SERUM: CPT

## 2024-07-17 PROCEDURE — 86709 HEPATITIS A IGM ANTIBODY: CPT

## 2024-07-17 PROCEDURE — 80503 PATH CLIN CONSLTJ SF 5-20: CPT

## 2024-07-17 PROCEDURE — 82043 UR ALBUMIN QUANTITATIVE: CPT

## 2024-07-17 PROCEDURE — 86706 HEP B SURFACE ANTIBODY: CPT

## 2024-07-17 PROCEDURE — 86704 HEP B CORE ANTIBODY TOTAL: CPT

## 2024-07-17 PROCEDURE — 84443 ASSAY THYROID STIM HORMONE: CPT

## 2024-07-17 RX ORDER — FLUOCINONIDE TOPICAL SOLUTION USP, 0.05% 0.5 MG/ML
SOLUTION TOPICAL
Qty: 60 ML | Refills: 12 | Status: SHIPPED | OUTPATIENT
Start: 2024-07-17

## 2024-07-17 NOTE — PROGRESS NOTES
July 17, 2024    New patient     Reffered by Dr. Herrera    CHIEF COMPLAINT: Hair loss     HISTORY OF PRESENT ILLNESS: .    1. Hair loss  Location: Scalp   Duration: 2 months   Signs and symptoms: hair falling and itchy scalp     DERM HISTORY:  History of skin cancer: No  History of chronic skin disease/condition: No    FAMILY HISTORY:  History of melanoma: No  History of chronic skin disease/condition: No    History/Other:    REVIEW OF SYSTEMS:  Constitutional: Denies fever, chills, unintentional weight loss.   Skin as per HPI    PAST MEDICAL HISTORY:  Past Medical History:    Amenorrhea    Anxiety    High blood pressure    Hypertension    Shortness of breath    Sleep apnea    Varicose vein       Medications  Current Outpatient Medications   Medication Sig Dispense Refill    semaglutide (OZEMPIC, 0.25 OR 0.5 MG/DOSE,) 2 MG/3ML Subcutaneous Solution Pen-injector Inject 0.25 mg into the skin once a week. 3 mL 1    hydrocortisone 2.5 % External Cream Apply rectally twice daily and after each bowel movement 28 g 0    hydrocortisone-pramoxine perianal (PROCTOFOAM HC) 1-1 % External Foam Place 1 applicator rectally 2 (two) times daily for 5 days. 1 each 0    bisacodyl 5 MG Oral Tab EC Take 1 tablet (5 mg total) by mouth 2 (two) times daily as needed for constipation. 14 tablet 0    ondansetron (ZOFRAN) 4 mg tablet Take 1 tablet (4 mg total) by mouth every 12 (twelve) hours as needed for Nausea. 20 tablet 0    Meloxicam (MOBIC) 15 MG Oral Tab Take 1 tablet (15 mg total) by mouth daily for 14 days. 14 tablet 0    triamcinolone 0.1 % External Cream Apply topically 2 (two) times daily as needed. 60 g 3    Potassium Chloride ER 10 MEQ Oral Tab CR Take 1 tab po daily  together  with  furosemide 90 tablet 0    furosemide 20 MG Oral Tab Take  1 tab po daily 90 tablet 0    ergocalciferol 1.25 MG (79987 UT) Oral Cap Take 1 capsule (50,000 Units total) by mouth once a week. With food for 12 weeks total then begin OTC Vitamin D at  2000 units daily with food thereafter 12 capsule 0    metFORMIN  MG Oral Tablet 24 Hr Take 1 tablet (750 mg total) by mouth daily. 90 tablet 0    mometasone 0.1 % External Ointment Apply 1 Application topically daily. Apply to entrance of ear canal before sleep for `10-14 days 1 each 0    nortriptyline 25 MG Oral Cap Start with 1 pill QHS, for 1 week, then 2 pills qhs foor another week, then 3 pills qhs 90 capsule 3    metoprolol succinate ER 25 MG Oral Tablet 24 Hr Take 1 tablet (25 mg total) by mouth daily. 90 tablet 1       Objective:    PHYSICAL EXAM:  General: awake, alert, no acute distress  Skin: Skin exam was performed today including the following: scalp. Pertinent findings include:   - with alopetic patch on L occipital scalp     ASSESSMENT & PLAN:  Pathophysiology of diagnoses discussed with patient.  Therapeutic options reviewed. Risks, benefits, and alternatives discussed with patient. Instructions reviewed at length.    #Alopecia areata  - Start Lidex 0.05 solution Monday through Friday to affected areas of scalp.  Take weekends off.  - Check TSH and vitamin D level today.  - Intralesional Kenalog (triamcinolone) injection today     Concentration: 5 mg/mL  Site: scalp  Total volume injected: 0.3cc    Patient was counseled on the possible side effects of injection: pain at site, infection, atrophy of skin, systemic absorption, hypopigmentation, loss of hair.  Questions if any were addressed before proceeding with prep of the site with rubbing alcohol.  Kenalog was injected with sterile syringe and 25 g needle.  Patient tolerated well.       Return to clinic: 4 weeks or sooner if something concerning arises     Eyad Gonzalez MD

## 2024-07-17 NOTE — PROGRESS NOTES
VASCULAR SURGERY CONSULT NOTE      Judi White   :  1965  MR#  [id number]    REFERRING PHYSICIAN:  Chata Leigh  PRIMARY CARE PHYSICIAN:  Estelle Multani MD    Chief Complaint   Patient presents with    Consult    Varicose Veins     Patient is c/o VV SANTOS LEG itchy, heaviness and sometimes swelling x 2-3 weeks  Laser vein procedure 2022      Test Results     US venous on 24       HPI:    The patient is a 59 year old female who has been referred to the clinic today for an evaluation of her bilateral lower extremity heaviness, tiredness, itchiness, edema and pain after prolonged standing. The pain is reported in her calves and distal legs. This is interfering with her activities of daily living and exercise. She has not worn compression stockings in the recent past. She had underwent venous ablation of both lower extremities on 2022 in [facility].   She has been recently diagnosed with type 2 diabetes and also reports frequent back pains. Currently attending physical therapy sessions for her back issues.     PAST MEDICAL HISTORY:     Past Medical History:    Amenorrhea    Anxiety    Diabetes (HCC)    High blood pressure    Hypertension    Obesity    Scoliosis    Shortness of breath    Sleep apnea    Varicose vein       PAST SURGICAL HISTORY:     Past Surgical History:   Procedure Laterality Date      1989        MEDICATIONS:     Current Outpatient Medications   Medication Sig Dispense Refill    semaglutide (OZEMPIC, 0.25 OR 0.5 MG/DOSE,) 2 MG/3ML Subcutaneous Solution Pen-injector Inject 0.25 mg into the skin once a week. 3 mL 1    Fluocinonide 0.05 % External Solution Apply twice daily  Monday-Friday. Take weekends off. Use on scalp 60 mL 12    hydrocortisone 2.5 % External Cream Apply rectally twice daily and after each bowel movement 28 g 0    hydrocortisone-pramoxine perianal (PROCTOFOAM HC) 1-1 % External Foam Place 1 applicator rectally 2 (two) times daily for 5 days. 1 each 0     bisacodyl 5 MG Oral Tab EC Take 1 tablet (5 mg total) by mouth 2 (two) times daily as needed for constipation. 14 tablet 0    ondansetron (ZOFRAN) 4 mg tablet Take 1 tablet (4 mg total) by mouth every 12 (twelve) hours as needed for Nausea. 20 tablet 0    Meloxicam (MOBIC) 15 MG Oral Tab Take 1 tablet (15 mg total) by mouth daily for 14 days. 14 tablet 0    triamcinolone 0.1 % External Cream Apply topically 2 (two) times daily as needed. 60 g 3    Potassium Chloride ER 10 MEQ Oral Tab CR Take 1 tab po daily  together  with  furosemide 90 tablet 0    furosemide 20 MG Oral Tab Take  1 tab po daily 90 tablet 0    ergocalciferol 1.25 MG (80662 UT) Oral Cap Take 1 capsule (50,000 Units total) by mouth once a week. With food for 12 weeks total then begin OTC Vitamin D at 2000 units daily with food thereafter 12 capsule 0    metFORMIN  MG Oral Tablet 24 Hr Take 1 tablet (750 mg total) by mouth daily. 90 tablet 0    mometasone 0.1 % External Ointment Apply 1 Application topically daily. Apply to entrance of ear canal before sleep for `10-14 days 1 each 0    nortriptyline 25 MG Oral Cap Start with 1 pill QHS, for 1 week, then 2 pills qhs foor another week, then 3 pills qhs 90 capsule 3    metoprolol succinate ER 25 MG Oral Tablet 24 Hr Take 1 tablet (25 mg total) by mouth daily. 90 tablet 1       ALLERGIES:     Allergies   Allergen Reactions    Lactose ITCHING       SOCIAL HISTORY:     Social History     Socioeconomic History    Marital status:    Tobacco Use    Smoking status: Never    Smokeless tobacco: Never   Vaping Use    Vaping status: Never Used   Substance and Sexual Activity    Alcohol use: Not Currently    Drug use: Never    Sexual activity: Not Currently   Other Topics Concern    Caffeine Concern No    Exercise No    Seat Belt No    Special Diet No    Stress Concern No    Weight Concern Yes    Grew up on a farm Yes    History of tanning Yes    Outdoor occupation Yes    Breast feeding No     Reaction to local anesthetic No    Pt has a pacemaker No    Pt has a defibrillator No     Social Determinants of Health     Food Insecurity: Low Risk  (4/20/2024)    Received from [facility]    Food Insecurity     Have there been times that your food ran out, and you didn't have money to get more?: No     Are there times that you worry that this might happen?: No   Transportation Needs: Low Risk  (4/20/2024)    Received from [facility]    Transportation Needs     Do you have trouble getting transportation to medical appointments?: No        FAMILY HISTORY:   No family history on file.    ROS:   A 12 point review of systems with pertinent positives and negatives listed in the HPI.    PHYSICAL EXAM:   Resp 20   Ht 5' 4.96\" (1.65 m)   Wt (!) 312 lb (141.5 kg)   BMI 51.98 kg/m²   GENERAL: alert and orientated X 3, well developed, well nourished, in no apparent distress  HEENT: ears and throat are clear  NECK: supple, no lymphadenopathy, thyroid wnl  CAROTID: No bruits  RESPIRATORY: no rales, rhonchi, or wheezes B  CARDIO: RRR without murmur, no murmur, no gallop   ABDOMEN: soft, non-tender with no palpable aneurysm or masses  BACK: normal, no tenderness  SKIN: no rashes, warm and dry  NEURO/PSYCH: orientated x3, normal mood and affect, no sensory or motor deficit  EXTREMITIES: full range of motion, no tenderness or edema in either leg.   VASCULAR  Pulse exam right: femoral 2+, DP  2+, PT  2+  Pulse exam left: femoral  2+, DP  2+, PT  1+      Vein Assessment:      Mild scattered bilateral  LE varicose veins with no significant hemosiderin deposition.  Mild to moderate bilateral lower extremity edema.    IMPRESSION:   Bilateral  lower extremity pain due to venous insufficiency.   Symptomatic calf varicosities.  Bilateral lower extremity edema.     PLAN:     The patient was educated in the benign condition of venous disease.  I have given the patient a prescription for Grade  II compression stockings (20-30 mmHg, waist-highs). We reviewed the importance of wearing these daily and consistently. We also reviewed other types of conservative measures, such as periodic leg elevation, walking for exercise, analgesic use, attempts at weight loss, and the avoidance of prolonged standing.  I have sent the patient for a venous reflux ultrasound. Should the ultrasound study reveal venous reflux with dilation and she does not have relief of symptoms with conservative therapy, then endovenous laser ablation may be a possible treatment option.  I explained to the patient that her insurance company may require a 6-12 week trial period of conservative therapy prior to authorizing venous ablation treatment.  The patient could also benefit from a low dose of diuretic to decrease the edema of lower limbs. She was isntrcuted to discuss it with her PCP Dr. Multani.   Continuance of physical therapy is strongly recommended since her lower extremity pains could also have a neurogenic nature.   The patient understood and agreed to proceed with this treatment plan, all of her questions were answered during this clinic visit. She was asked to FU in several weeks to assess her response to conservative treatment.      Thank you for allowing to participate in the care of your patient.    GILBERT Ventura  Division of Vascular Surgery with Dr. Najjar.

## 2024-07-17 NOTE — PATIENT INSTRUCTIONS
Please call and make an appointment for your compression stockings. The list below are different vendors who have compression stockings available. The ones with asterisks accept insurance.

## 2024-07-17 NOTE — TELEPHONE ENCOUNTER
Your Appointments      Tuesday July 23, 2024 3:00 PM  Follow Up Visit with EDA Núñez  [facility] ([facility] [address] [address]  [phone]

## 2024-07-17 NOTE — TELEPHONE ENCOUNTER
I have no availability to see patient until August. If needs follow up Luz is great.    Discussed with Luz, if having rectal bleeding I can perform colonoscopy in the future.    Cologuard negative. Hemoglobin normal. Likely hemorrhoids but can evaluate with colonoscopy depending on luz's evaluation.

## 2024-07-18 RX ORDER — ERGOCALCIFEROL 1.25 MG/1
50000 CAPSULE ORAL WEEKLY
Qty: 12 CAPSULE | Refills: 1 | Status: SHIPPED | OUTPATIENT
Start: 2024-07-18

## 2024-07-20 ENCOUNTER — LAB ENCOUNTER (OUTPATIENT)
Dept: LAB | Age: 59
End: 2024-07-20
Attending: NURSE PRACTITIONER
Payer: MEDICAID

## 2024-07-20 ENCOUNTER — OFFICE VISIT (OUTPATIENT)
Dept: INTERNAL MEDICINE CLINIC | Facility: CLINIC | Age: 59
End: 2024-07-20
Payer: MEDICAID

## 2024-07-20 VITALS
WEIGHT: 293 LBS | HEART RATE: 80 BPM | BODY MASS INDEX: 48.82 KG/M2 | HEIGHT: 64.96 IN | SYSTOLIC BLOOD PRESSURE: 130 MMHG | DIASTOLIC BLOOD PRESSURE: 86 MMHG

## 2024-07-20 DIAGNOSIS — E78.2 MIXED HYPERLIPIDEMIA: ICD-10-CM

## 2024-07-20 DIAGNOSIS — E11.9 CONTROLLED TYPE 2 DIABETES MELLITUS WITHOUT COMPLICATION, WITHOUT LONG-TERM CURRENT USE OF INSULIN (HCC): Primary | ICD-10-CM

## 2024-07-20 DIAGNOSIS — K76.0 HEPATIC STEATOSIS: ICD-10-CM

## 2024-07-20 DIAGNOSIS — R74.8 ELEVATED LIVER ENZYMES: ICD-10-CM

## 2024-07-20 DIAGNOSIS — R94.31 ABNORMAL EKG: ICD-10-CM

## 2024-07-20 PROBLEM — R91.8 PULMONARY NODULES: Status: ACTIVE | Noted: 2024-07-20

## 2024-07-20 LAB
ALBUMIN SERPL-MCNC: 4.8 G/DL (ref 3.2–4.8)
ALBUMIN/GLOB SERPL: 1.3 {RATIO} (ref 1–2)
ALP LIVER SERPL-CCNC: 100 U/L
ALT SERPL-CCNC: 79 U/L
ANION GAP SERPL CALC-SCNC: 4 MMOL/L (ref 0–18)
AST SERPL-CCNC: 52 U/L (ref ?–34)
BILIRUB SERPL-MCNC: 0.5 MG/DL (ref 0.3–1.2)
BUN BLD-MCNC: 11 MG/DL (ref 9–23)
BUN/CREAT SERPL: 15.7 (ref 10–20)
CALCIUM BLD-MCNC: 10 MG/DL (ref 8.7–10.4)
CHLORIDE SERPL-SCNC: 107 MMOL/L (ref 98–112)
CO2 SERPL-SCNC: 28 MMOL/L (ref 21–32)
CREAT BLD-MCNC: 0.7 MG/DL
DEPRECATED HBV CORE AB SER IA-ACNC: 24.2 NG/ML
EGFRCR SERPLBLD CKD-EPI 2021: 100 ML/MIN/1.73M2 (ref 60–?)
FASTING STATUS PATIENT QL REPORTED: NO
GLOBULIN PLAS-MCNC: 3.6 G/DL (ref 2–3.5)
GLUCOSE BLD-MCNC: 95 MG/DL (ref 70–99)
HAV AB SER QL IA: REACTIVE
HAV IGM SER QL: NONREACTIVE
HBV CORE AB SERPL QL IA: NONREACTIVE
HBV SURFACE AB SER QL: NONREACTIVE
HBV SURFACE AB SERPL IA-ACNC: <3.1 MIU/ML
HBV SURFACE AG SERPL QL IA: NONREACTIVE
HCV AB SERPL QL IA: NONREACTIVE
OSMOLALITY SERPL CALC.SUM OF ELEC: 287 MOSM/KG (ref 275–295)
POTASSIUM SERPL-SCNC: 4.2 MMOL/L (ref 3.5–5.1)
PROT SERPL-MCNC: 8.4 G/DL (ref 5.7–8.2)
SODIUM SERPL-SCNC: 139 MMOL/L (ref 136–145)
TRANSFERRIN SERPL-MCNC: 302 MG/DL (ref 250–380)

## 2024-07-20 PROCEDURE — 82390 ASSAY OF CERULOPLASMIN: CPT

## 2024-07-20 PROCEDURE — 80053 COMPREHEN METABOLIC PANEL: CPT

## 2024-07-20 PROCEDURE — 36415 COLL VENOUS BLD VENIPUNCTURE: CPT

## 2024-07-20 PROCEDURE — 83516 IMMUNOASSAY NONANTIBODY: CPT

## 2024-07-20 PROCEDURE — 99214 OFFICE O/P EST MOD 30 MIN: CPT | Performed by: NURSE PRACTITIONER

## 2024-07-20 RX ORDER — ORAL SEMAGLUTIDE 7 MG/1
7 TABLET ORAL DAILY
Qty: 90 TABLET | Refills: 3 | Status: SHIPPED | OUTPATIENT
Start: 2024-08-19 | End: 2024-09-18

## 2024-07-20 RX ORDER — ORAL SEMAGLUTIDE 3 MG/1
3 TABLET ORAL DAILY
Qty: 30 TABLET | Refills: 0 | Status: SHIPPED | OUTPATIENT
Start: 2024-07-20 | End: 2024-08-19

## 2024-07-20 RX ORDER — ATORVASTATIN CALCIUM 10 MG/1
10 TABLET, FILM COATED ORAL NIGHTLY
Qty: 90 TABLET | Refills: 3 | Status: SHIPPED | OUTPATIENT
Start: 2024-07-20

## 2024-07-20 NOTE — PROGRESS NOTES
Judi White is a 59 year old female.  HPI:   Pt is f/u after ED for abdominal pain. She was recently started on Trulicity but reports significant nausea and was advised to stop it. She was ordered Ozempic but PA was denied. She reports nausea resolved since stopping Trulicity. Had intermittent abdominal pain after Trulicity as well,  an episode of abdominal pain recently went to ER, CT in ER was unremarkable, fatty liver noted. She is scheduled with GI next week for evaluation of her abdominal pain. She also has epigastric pain/GERD and taking PPI. Denies any new or worsening sx, feeling better.   Current Outpatient Medications   Medication Sig Dispense Refill    Semaglutide (RYBELSUS) 3 MG Oral Tab Take 3 mg by mouth daily. 30 tablet 0    [START ON 8/19/2024] Semaglutide (RYBELSUS) 7 MG Oral Tab Take 7 mg by mouth daily. 90 tablet 3    atorvastatin 10 MG Oral Tab Take 1 tablet (10 mg total) by mouth nightly. 90 tablet 3    ergocalciferol 1.25 MG (47970 UT) Oral Cap Take 1 capsule (50,000 Units total) by mouth once a week. 12 capsule 1    Fluocinonide 0.05 % External Solution Apply twice daily  Monday-Friday. Take weekends off. Use on scalp 60 mL 12    hydrocortisone 2.5 % External Cream Apply rectally twice daily and after each bowel movement 28 g 0    bisacodyl 5 MG Oral Tab EC Take 1 tablet (5 mg total) by mouth 2 (two) times daily as needed for constipation. 14 tablet 0    ondansetron (ZOFRAN) 4 mg tablet Take 1 tablet (4 mg total) by mouth every 12 (twelve) hours as needed for Nausea. 20 tablet 0    triamcinolone 0.1 % External Cream Apply topically 2 (two) times daily as needed. 60 g 3    Potassium Chloride ER 10 MEQ Oral Tab CR Take 1 tab po daily  together  with  furosemide 90 tablet 0    furosemide 20 MG Oral Tab Take  1 tab po daily 90 tablet 0    ergocalciferol 1.25 MG (51029 UT) Oral Cap Take 1 capsule (50,000 Units total) by mouth once a week. With food for 12 weeks total then begin OTC Vitamin D  at 2000 units daily with food thereafter 12 capsule 0    metFORMIN  MG Oral Tablet 24 Hr Take 1 tablet (750 mg total) by mouth daily. 90 tablet 0    mometasone 0.1 % External Ointment Apply 1 Application topically daily. Apply to entrance of ear canal before sleep for `10-14 days 1 each 0    nortriptyline 25 MG Oral Cap Start with 1 pill QHS, for 1 week, then 2 pills qhs foor another week, then 3 pills qhs 90 capsule 3    metoprolol succinate ER 25 MG Oral Tablet 24 Hr Take 1 tablet (25 mg total) by mouth daily. 90 tablet 1      Past Medical History:    Amenorrhea    Anxiety    Diabetes (HCC)    High blood pressure    Hypertension    Mixed hyperlipidemia    Obesity    Scoliosis    Shortness of breath    Sleep apnea    Varicose vein      Social History:  Social History     Socioeconomic History    Marital status:    Tobacco Use    Smoking status: Never    Smokeless tobacco: Never   Vaping Use    Vaping status: Never Used   Substance and Sexual Activity    Alcohol use: Not Currently    Drug use: Never    Sexual activity: Not Currently   Other Topics Concern    Caffeine Concern No    Exercise No    Seat Belt No    Special Diet No    Stress Concern No    Weight Concern Yes    Grew up on a farm Yes    History of tanning Yes    Outdoor occupation Yes    Breast feeding No    Reaction to local anesthetic No    Pt has a pacemaker No    Pt has a defibrillator No     Social Determinants of Health     Food Insecurity: Low Risk  (4/20/2024)    Received from [facility]    Food Insecurity     Have there been times that your food ran out, and you didn't have money to get more?: No     Are there times that you worry that this might happen?: No   Transportation Needs: Low Risk  (4/20/2024)    Received from [facility]    Transportation Needs     Do you have trouble getting transportation to medical appointments?: No        REVIEW OF SYSTEMS:   Review of Systems   All other  systems reviewed and are negative.         EXAM:   /86   Pulse 80   Ht 5' 4.96\" (1.65 m)   Wt (!) 311 lb (141.1 kg)   BMI 51.82 kg/m²     Physical Exam  Vitals reviewed.   Constitutional:       General: She is not in acute distress.     Appearance: She is obese.   Eyes:      General: No scleral icterus.     Pupils: Pupils are equal, round, and reactive to light.   Cardiovascular:      Rate and Rhythm: Normal rate and regular rhythm.      Pulses: Normal pulses.      Heart sounds: Normal heart sounds.   Pulmonary:      Effort: Pulmonary effort is normal.      Breath sounds: Normal breath sounds.   Neurological:      Mental Status: She is alert and oriented to person, place, and time.   Psychiatric:         Mood and Affect: Mood normal.         Judgment: Judgment normal.          Bilateral barefoot skin diabetic exam is normal, visualized feet and the appearance is normal.  Bilateral monofilament/sensation of both feet is normal.  Pulsation pedal pulse exam of both lower legs/feet is normal as well.       ASSESSMENT AND PLAN:   1. Controlled type 2 diabetes mellitus without complication, without long-term current use of insulin ([facility])  -A1c:   6.5% 7/2024  6.6% 4/2024  -Screening:  Retinopathy: scheduled for 9/2024  Neuropathy: normal exam 7/2024  Nephropathy: normal microalb/creat 7/2024  -Meds:   -Metformin 750 mg ER  did not tolerate Trulicity (nausea and abdominal pain, resolved with stopping medication.  Starting statin  -Plan:   Start Rybelsus 3 mg daily for 30 days then increase to 7 mg daily Reviewed dose and s/e.   CMP in 2 weeks  A1c improving, repeat in 3 months.   Referral to diabetic educator  - Semaglutide (RYBELSUS) 3 MG Oral Tab; Take 3 mg by mouth daily.  Dispense: 30 tablet; Refill: 0  - Semaglutide (RYBELSUS) 7 MG Oral Tab; Take 7 mg by mouth daily.  Dispense: 90 tablet; Refill: 3  - DIABETIC EDUCATION - INTERNAL    2. Mixed hyperlipidemia  -Start atorvastatin, CMP in 2 weeks, diet and  lifestyle modifications reviewed, lipids in 6 mo. Reviewed dose and s/e.   - Comp Metabolic Panel (14) [E]; Future  - atorvastatin 10 MG Oral Tab; Take 1 tablet (10 mg total) by mouth nightly.  Dispense: 90 tablet; Refill: 3  - DIABETIC EDUCATION - INTERNAL    3. Elevated liver enzymes  -Labs ordered.  -CT AP and US RUQ: 7/13/2024: \"Liver is increased in echogenicity.  The liver is enlarged and measures 21.2 cm \", \"The liver is enlarged with the right hepatic lobe measuring 21.2 cm in the midclavicular line.  There is hepatic steatosis.  No suspicious hepatic lesion.  No acute cholecystitis.  No intrahepatic or extrahepatic biliary ductal   dilatation. \"  -Keep appt with Gastro 7/23/2024  -Statin for HLD.     4. Abnormal EKG  -7/2024: NSR, Right superior axis deviation, ST and T wave abnormality, consider inferior ischemia, T axis shifted to left.   -Echo completed [facility] 4/2024: 1. The left ventricle is normal in size. There is borderline concentric left ventricular hypertrophy. Left ventricular systolic function is normal. EF = 67% (2D biplane) Left ventricular diastolic function is consistent with abnormal relaxation (stage I). Normal wall motion at rest. 2. The right ventricle is mildly dilated. Right ventricular systolic function is normal. IVC not well seen.   3. The visualized aorta is normal in size.   4. No significant valvular abnormalities.            The patient indicates understanding of these issues and agrees to the plan.  The patient is asked to return in 4 weeks.     The above note was creating using Dragon speech recognition technology. Please excuse any typos.

## 2024-07-21 LAB — CERULOPLASMIN SERPL-MCNC: 32 MG/DL (ref 20–60)

## 2024-07-22 ENCOUNTER — TELEPHONE (OUTPATIENT)
Dept: INTERNAL MEDICINE CLINIC | Facility: CLINIC | Age: 59
End: 2024-07-22

## 2024-07-22 LAB
ALBUMIN SERPL ELPH-MCNC: 4.21 G/DL (ref 3.75–5.21)
ALBUMIN/GLOB SERPL: 1.28 {RATIO} (ref 1–2)
ALPHA1 GLOB SERPL ELPH-MCNC: 0.26 G/DL (ref 0.19–0.46)
ALPHA2 GLOB SERPL ELPH-MCNC: 0.77 G/DL (ref 0.48–1.05)
B-GLOBULIN SERPL ELPH-MCNC: 0.89 G/DL (ref 0.68–1.23)
GAMMA GLOB SERPL ELPH-MCNC: 1.37 G/DL (ref 0.62–1.7)
PROT SERPL-MCNC: 7.5 G/DL (ref 5.7–8.2)

## 2024-07-22 NOTE — PROGRESS NOTES
[facility] - Gastroenterology                                                                                                               Reason for consult: eval    Requesting physician or provider: Estelle Mutlani MD    Chief Complaint   Patient presents with    Follow - Up     Hospital follow up; blood in stool       HPI:   Judi White is a 59 year old year-old female with history of hld, anxiety, dm, htn, obesity, scoliosis, sleep apnea:    she is here today for  f/U  Her preferred language is ukranian and an  was used for today's visit    Pt seen by Dr. Mayer in clinic 11/2023  Denied brbpr at tov  No fhx gi malignancy  No previous c-scope  High risk given bmi >50  Plan for non-invasive crc screening     Had neg cologuard 12/9/23    EGD 11/2023 neg celiac  Neg HP     Reviewed w/U in ED 7/13/24:  Moderate colonic stool burden on ct a/p   Not anemic on cbc, however noted hgb down slightly from overall trend  BUN/crt normal     Advised use of miralax in am and titrate to desired effect  Fibercon or citrucel in evening  Anusol for empiric treatment of hemorrhoids (prescribed in ed)     She reports seeing brbpr and having rectal discomfort w/ bm for 3 days following ed visit.  Sx now resolved.  No melena.  She has daily bm, however has straining.  Sx improved with use of miralax and fiber supplement.    She endorses heartburn despite use of omeprazole.  She says sx with all foods. she denies dysphagia, odynophagia and/or globus. she reports gen abd pain and nausea. No vomiting.  she denies recent change in appetite and/or unintentional weight loss.    NSAIDS: no  Tobacco: no  Alcohol: no  Marijuana: no  Illicit drugs: no    No FH GI malignancy    No history of adverse reaction to sedation  EVANGELIST  No anticoagulants  No pacemaker/defibrillator  No pain medications and/or sleep aides    Last colonoscopy: no      Wt Readings from Last 6 Encounters:   07/23/24  (!) 311 lb (141.1 kg)   24 (!) 311 lb (141.1 kg)   24 (!) 312 lb (141.5 kg)   24 (!) 312 lb (141.5 kg)   24 (!) 312 lb (141.5 kg)   24 (!) 312 lb (141.5 kg)        History, Medications, Allergies, ROS:      Past Medical History:    Amenorrhea    Anxiety    Diabetes (HCC)    High blood pressure    Hypertension    Mixed hyperlipidemia    Obesity    Scoliosis    Shortness of breath    Sleep apnea    Varicose vein      Past Surgical History:   Procedure Laterality Date      1989      Family Hx: History reviewed. No pertinent family history.   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    History of tanning Yes    Outdoor occupation Yes    Breast feeding No    Reaction to local anesthetic No    Pt has a pacemaker No    Pt has a defibrillator No     Social Determinants of Health     Food Insecurity: Low Risk  (2024)    Received from [facility]    Food Insecurity     Have there been times that your food ran out, and you didn't have money to get more?: No     Are there times that you worry that this might happen?: No   Transportation Needs: Low Risk  (2024)    Received from [facility]    Transportation Needs     Do you have trouble getting transportation to medical appointments?: No        Medications (Active prior to today's visit):  Current Outpatient Medications   Medication Sig Dispense Refill    PEG 3350-KCl-Na Bicarb-NaCl (TRILYTE) 420 g Oral Recon Soln Take prep as directed by gastro office. May  substitute with Trilyte/generic equivalent if needed. 4000 mL 0    famotidine (PEPCID) 40 MG Oral Tab Take 1 tablet (40 mg total) by mouth at bedtime. 30 tablet 1    atorvastatin 10 MG Oral Tab Take 1 tablet (10 mg total) by mouth nightly. 90 tablet 3    ergocalciferol 1.25 MG (27623 UT) Oral Cap Take 1 capsule (50,000 Units total) by mouth once a week. 12 capsule 1    Fluocinonide 0.05 % External Solution Apply twice daily  Monday-Friday. Take weekends off. Use on scalp 60 mL 12    hydrocortisone 2.5 % External Cream Apply rectally twice daily and after each bowel movement 28 g 0    ondansetron (ZOFRAN) 4 mg tablet Take 1 tablet (4 mg total) by mouth every 12 (twelve) hours as needed for Nausea. 20 tablet 0    triamcinolone 0.1 % External Cream Apply topically 2 (two) times daily as needed. 60 g 3    Potassium Chloride ER 10 MEQ Oral Tab CR Take 1 tab po daily  together  with  furosemide 90 tablet 0    furosemide 20 MG Oral Tab Take  1 tab po daily 90 tablet 0    ergocalciferol 1.25 MG (77254 UT) Oral Cap Take 1 capsule (50,000 Units total) by mouth once a week. With food for 12 weeks total then begin OTC Vitamin D at 2000 units daily with food thereafter 12 capsule 0    metFORMIN  MG Oral Tablet 24 Hr Take 1 tablet (750 mg total) by mouth daily. 90 tablet 0    mometasone 0.1 % External Ointment Apply 1 Application topically daily. Apply to entrance of ear canal before sleep for `10-14 days 1 each 0    nortriptyline 25 MG Oral Cap Start with 1 pill QHS, for 1 week, then 2 pills qhs foor another week, then 3 pills qhs 90 capsule 3    metoprolol succinate ER 25 MG Oral Tablet 24 Hr Take 1 tablet (25 mg total) by mouth daily. 90 tablet 1    Semaglutide (RYBELSUS) 3 MG Oral Tab Take 3 mg by mouth daily. (Patient not taking: Reported on 7/23/2024) 30 tablet 0    [START ON 8/19/2024] Semaglutide (RYBELSUS) 7 MG Oral Tab Take 7 mg by mouth daily. (Patient not taking: Reported on 7/23/2024) 90 tablet 3        Allergies:  Allergies   Allergen Reactions    Trulicity [Dulaglutide] NAUSEA ONLY    Lactose ITCHING       ROS:   CONSTITUTIONAL: negative for fevers, chills, sweats and weight loss  EYES Negative for red eyes, yellow eyes, changes in vision  HEENT: Negative for dysphagia and hoarseness  RESPIRATORY: Negative for cough and shortness of breath  CARDIOVASCULAR: Negative for chest pain, palpitations  GASTROINTESTINAL: See HPI  GENITOURINARY: Negative for dysuria and frequency  MUSCULOSKELETAL: Negative for arthralgias and myalgias  NEUROLOGICAL: Negative for dizziness and headaches  BEHAVIOR/PSYCH: Negative for anxiety and poor appetite    PHYSICAL EXAM:   Blood pressure (!) 135/92, height 5' 4\" (1.626 m), weight (!) 311 lb (141.1 kg).    GEN: WD/WN, NAD  HEENT: Supple symmetrical, trachea midline  CV: RRR, the extremities are warm and well perfused   LUNGS: No increased work of breathing  ABDOMEN: No scars, normal bowel sounds, soft, non-tender, non-distended no rebound or guarding, no masses, no hepatomegaly  MSK: No redness, no warmth, no swelling of joints  SKIN: No jaundice, no erythema, no rashes  HEMATOLOGIC: No bleeding, no bruising  NEURO: Alert and interactive, normal gait    Labs/Imaging/Procedures:     Patient's pertinent labs and imaging were reviewed and discussed with patient today.        .  ASSESSMENT/PLAN:   Judi White is a 59 year old year-old female with history of hld, anxiety, dm, htn, obesity, scoliosis, sleep apnea:    #brbpr  No fhx gi malignancy. Cologuard neg 12/2023. Not anemic on CBC 7/13/24. CT a/p c/w constipation. Ferritin normal 7/20/24. I suspect reported bleeding is likely hemorrhoidal.  Sx have improved at today's visit.  However, has not had c-scope and given symptoms, will plan for procedure to exclude advanced polyp, etc.      #heartburn  #nausea  #gen abd pain  EGD, CT w/o concerning finding. Sx inspite of omeprazole.  Is on rybelsus and think could be contributing  to complaints.  Recommended treatment of constipation, reflux diet modification, omeprazole in am, add pepcid 40 mg in evening, consider rybelsus as cause with prescriber.       #hepatomegaly  #hepatic steatosis  #elevated liver enzymes  Suspect nafld given ct, ultrasound results. Chronic liver disease w/U completed by pcp. ASMA pending at today's visit.  I added m2 ab.  Not immune to hep b and advised considering vaccine with primary care provider.  Additional recommendations below.       -reflux diet modification  -omeprazole in am  -add pepcid 40 mg in evening  -consider rybelsus as cause with prescriber    -low-fat diet  -weight loss  -monitor cholesterol and blood sugar with pcp  -hep b vaccine with pcp  -Advised use of miralax in am and titrate to desired effect  -Fibercon or citrucel in evening  -Anusol as needed for hemorrhoids  -Er if condition decline    1. Schedule colonoscopy with [facility] w/ Dr. Mayer [Diagnosis: brbpr]    2.  bowel prep from pharmacy (split trilyte -Buy over the counter dulcolax laxative, and take one tablet daily for 3 days prior to drinking the bowel prep.  )    3. Hold semaglutide 7 days prior to procedure    Hold metformin day before and am of procedure    For cardiology patients and patients on blood thinners:  Please contact your cardiology clinic for clearance to proceed with the endoscopic procedure. If you are on blood thinners, please also confirm with your cardiologic clinic that you are able to hold the blood thinner per our recommendations.\"    BLOOD THINNER ORDERS:  -Hold for 48 hours (Xarelto, Eliquis, Pradaxa, Savaysa)  -Hold for 3 days (Pletal)  -Hold for 5 days (Coumadin, Plavix, Brilinta, Aggrenox)  -Hold for 7 days (Effient)     For endocrinology insulin patients:    Please contact your endocrinology clinic for insulin adjustment orders prior to your endoscopic procedure.    4. Read all bowel prep instructions carefully. Bowel prep instructions can also be found  online at:  [email or web address]/[email or web address]     5. AVOID seeds, nuts, popcorn, raw fruits and vegetables for 3 days before procedure    6. You MAY need to go for COVID testing 72 hours before procedure. The testing team will call you a few days before your procedure to discuss with you if testing is required. If you are asked to go for COVID testing and do not completed the test, the procedure cannot be performed.     7. If you start any NEW medication after your visit today, please notify us. Certain medications (like iron or weight loss medications) will need to be held before the procedure, or the procedure cannot be performed safely.      Orders This Visit:  Orders Placed This Encounter   Procedures    Mitochondrial (M2) Antibody       Meds This Visit:  Requested Prescriptions     Signed Prescriptions Disp Refills    PEG 3350-KCl-Na Bicarb-NaCl (TRILYTE) 420 g Oral Recon Soln 4000 mL 0     Sig: Take prep as directed by gastro office. May substitute with Trilyte/generic equivalent if needed.    famotidine (PEPCID) 40 MG Oral Tab 30 tablet 1     Sig: Take 1 tablet (40 mg total) by mouth at bedtime.       Imaging & Referrals:  None    ENDOSCOPIC RISK BENEFIT DISCUSSION: I described the procedure in great detail with the patient. I discussed the risks and benefits, including but not limited to: bleeding, perforation, infection, anesthesia complications, and even death. Patient will be NPO after midnight and will have a person physically present at time of pick-up to drive patient home. Patient verbalized understanding and agrees to proceed with procedure as planned.    Kenisha Salinas, APRN   7/23/2024        This note was partially prepared using Dragon [employer] voice recognition dictation software. As a result, errors may occur. When identified, these errors have been corrected. While every attempt is made to correct errors during dictation, discrepancies may still exist.

## 2024-07-23 ENCOUNTER — OFFICE VISIT (OUTPATIENT)
Facility: CLINIC | Age: 59
End: 2024-07-23
Payer: MEDICAID

## 2024-07-23 ENCOUNTER — TELEPHONE (OUTPATIENT)
Facility: CLINIC | Age: 59
End: 2024-07-23

## 2024-07-23 VITALS
DIASTOLIC BLOOD PRESSURE: 92 MMHG | WEIGHT: 293 LBS | BODY MASS INDEX: 50.02 KG/M2 | HEIGHT: 64 IN | SYSTOLIC BLOOD PRESSURE: 135 MMHG

## 2024-07-23 DIAGNOSIS — K76.0 HEPATIC STEATOSIS: Primary | ICD-10-CM

## 2024-07-23 DIAGNOSIS — R16.0 HEPATOMEGALY: ICD-10-CM

## 2024-07-23 DIAGNOSIS — R12 HEARTBURN: ICD-10-CM

## 2024-07-23 DIAGNOSIS — K62.5 BRBPR (BRIGHT RED BLOOD PER RECTUM): ICD-10-CM

## 2024-07-23 DIAGNOSIS — K62.5 BRBPR (BRIGHT RED BLOOD PER RECTUM): Primary | ICD-10-CM

## 2024-07-23 DIAGNOSIS — R74.8 ELEVATED LIVER ENZYMES: ICD-10-CM

## 2024-07-23 LAB — ACTIN SMOOTH MUSCLE AB: 8 UNITS

## 2024-07-23 PROCEDURE — 99215 OFFICE O/P EST HI 40 MIN: CPT | Performed by: NURSE PRACTITIONER

## 2024-07-23 RX ORDER — POLYETHYLENE GLYCOL 3350, SODIUM CHLORIDE, SODIUM BICARBONATE, POTASSIUM CHLORIDE 420; 11.2; 5.72; 1.48 G/4L; G/4L; G/4L; G/4L
POWDER, FOR SOLUTION ORAL
Qty: 4000 ML | Refills: 0 | Status: SHIPPED | OUTPATIENT
Start: 2024-07-23

## 2024-07-23 RX ORDER — FAMOTIDINE 40 MG/1
40 TABLET, FILM COATED ORAL NIGHTLY
Qty: 30 TABLET | Refills: 1 | Status: SHIPPED | OUTPATIENT
Start: 2024-07-23

## 2024-07-23 NOTE — TELEPHONE ENCOUNTER
Schedulers- Patient was seen in office today, please call patient to schedule procedure per providers orders below. I reviewed and handed a copy of prep instructions with patient in office as well as medications. Patient is aware of different locations and our providers possibly booking out. No further questions asked.        1. Schedule colonoscopy with [facility] w/ Dr. Mayer [Diagnosis: brbpr]     2.  bowel prep from pharmacy (split trilyte -Buy over the counter dulcolax laxative, and take one tablet daily for 3 days prior to drinking the bowel prep.  )     3. Hold semaglutide 7 days prior to procedure     Hold metformin day before and am of procedure     For cardiology patients and patients on blood thinners:  Please contact your cardiology clinic for clearance to proceed with the endoscopic procedure. If you are on blood thinners, please also confirm with your cardiologic clinic that you are able to hold the blood thinner per our recommendations.\"     BLOOD THINNER ORDERS:  -Hold for 48 hours (Xarelto, Eliquis, Pradaxa, Savaysa)  -Hold for 3 days (Pletal)  -Hold for 5 days (Coumadin, Plavix, Brilinta, Aggrenox)  -Hold for 7 days (Effient)      For endocrinology insulin patients:     Please contact your endocrinology clinic for insulin adjustment orders prior to your endoscopic procedure.     4. Read all bowel prep instructions carefully. Bowel prep instructions can also be found online at:  www.[email or web address]/[email or web address]      5. AVOID seeds, nuts, popcorn, raw fruits and vegetables for 3 days before procedure     6. You MAY need to go for COVID testing 72 hours before procedure. The testing team will call you a few days before your procedure to discuss with you if testing is required. If you are asked to go for COVID testing and do not completed the test, the procedure cannot be performed.      7. If you start any NEW medication after your visit today, please notify us. Certain medications (like iron or weight loss  medications) will need to be held before the procedure, or the procedure cannot be performed safely.

## 2024-07-23 NOTE — TELEPHONE ENCOUNTER
Last A1C sent was the following which is not greater than 6.5 stated in the denial      Component  Ref Range & Units 7/17/24 12:18 PM   HgbA1C  <5.7 % 6.5 High    Comment:  Normal HbA1C:     <5.7%   Pre-Diabetic:     5.7 - 6.4%   Diabetic:         >6.4%   Diabetic Control: <7.0%

## 2024-07-23 NOTE — TELEPHONE ENCOUNTER
Denied    Note from payer: Details of this decision are provided on the physician outcome notice which has been faxed to the number on file.  Payer: [facility] Case ID: [id number]    [phone]  Electronic appeal: Not supported  View History

## 2024-07-23 NOTE — PATIENT INSTRUCTIONS
-reflux diet modification  -omeprazole in am  -add pepcid 40 mg in evening  -consider rybelsus as cause with prescriber    -low-fat diet  -weight loss  -monitor cholesterol and blood sugar with pcp  -hep b vaccine with pcp  -Advised use of miralax in am and titrate to desired effect  -Fibercon or citrucel in evening  -Anusol as needed for hemorrhoids  -Er if condition decline    1. Schedule colonoscopy with [facility] w/ Dr. Mayer [Diagnosis: brbpr]    2.  bowel prep from pharmacy (split trilyte -Buy over the counter dulcolax laxative, and take one tablet daily for 3 days prior to drinking the bowel prep.  )    3. Hold semaglutide 7 days prior to procedure    Hold metformin day before and am of procedure    For cardiology patients and patients on blood thinners:  Please contact your cardiology clinic for clearance to proceed with the endoscopic procedure. If you are on blood thinners, please also confirm with your cardiologic clinic that you are able to hold the blood thinner per our recommendations.\"    BLOOD THINNER ORDERS:  -Hold for 48 hours (Xarelto, Eliquis, Pradaxa, Savaysa)  -Hold for 3 days (Pletal)  -Hold for 5 days (Coumadin, Plavix, Brilinta, Aggrenox)  -Hold for 7 days (Effient)     For endocrinology insulin patients:    Please contact your endocrinology clinic for insulin adjustment orders prior to your endoscopic procedure.    4. Read all bowel prep instructions carefully. Bowel prep instructions can also be found online at:  www.eehealth.org/giprep     5. AVOID seeds, nuts, popcorn, raw fruits and vegetables for 3 days before procedure    6. You MAY need to go for COVID testing 72 hours before procedure. The testing team will call you a few days before your procedure to discuss with you if testing is required. If you are asked to go for COVID testing and do not completed the test, the procedure cannot be performed.     7. If you start any NEW medication after your visit today, please notify us.  Certain medications (like iron or weight loss medications) will need to be held before the procedure, or the procedure cannot be performed safely.      Tips to Control Acid Reflux    To control acid reflux, you’ll need to make some basic diet and lifestyle changes. The simple steps outlined below may be all you’ll need to ease discomfort.   Watch what you eat  Don't have fatty foods or spicy foods.  Eat fewer acidic foods, such as citrus and tomato-based foods. These can increase symptoms.  Limit drinking alcohol, caffeine, and fizzy beverages. All increase acid reflux.  Try limiting chocolate, peppermint, and spearmint. These can make acid reflux worse in some people.     Watch when you eat  Don't lie down for 3 hours after eating.  Don't snack before going to bed.     Raise your head  Raising your head and upper body by 4 to 6 inches helps limit reflux when you’re lying down. Put blocks under the head of your bed frame or a wedge under your mattress to raise it.   Other changes  Lose weight, if you need to  Don’t exercise near bedtime  Don't wear tight-fitting clothes  Limit aspirin and ibuprofen  Stop smoking     Jamil last reviewed this educational content on 6/1/2019  © 7688-3471 [employer], [employer]. [address] [place] [address], [place] [address]. All rights reserved. This information is not intended as a substitute for professional medical care. Always follow your healthcare professional's instructions.

## 2024-07-23 NOTE — TELEPHONE ENCOUNTER
Can you send the A1c results to appeal this, they are requiring lab results. (All past A1c test).

## 2024-07-23 NOTE — TELEPHONE ENCOUNTER
Most current A1C is sent, the one closest to the office visit and medication being requested for prior auth.  The current A1C is better than the previous one, showing a decrease in A1C. If you feel an appeal is needed, please see steps below to complete an appeal:

## 2024-07-23 NOTE — TELEPHONE ENCOUNTER
Attempted to contact patient to schedule Colonoscopy; unable to leave message ; no voicemail (cell phone)    Voicemail is full and unable to accept messages at this time

## 2024-07-24 NOTE — TELEPHONE ENCOUNTER
Condition update: Navigator called [facility] to obtain PA status. Per representative PA was denied need clinical notes and lab results to support PA. Navigator faxed LOV and A1C results.     [employer] [facility]  Fax: [phone]  Fax confirmation rec'd

## 2024-07-24 NOTE — TELEPHONE ENCOUNTER
Esme WALTER with [employer] calling was disconnected was speaking with Rossy BELTRAN    Attempted to find caller for her & call disconnected

## 2024-07-25 ENCOUNTER — MED REC SCAN ONLY (OUTPATIENT)
Dept: PHYSICAL MEDICINE AND REHAB | Facility: CLINIC | Age: 59
End: 2024-07-25

## 2024-07-25 LAB — M2 MITOCHONDRIAL AB: <20 UNITS

## 2024-07-30 NOTE — PROGRESS NOTES
INITIAL OUTPATIENT NUTRITION CONSULTATION      Nutrition Assessment    Medical Diagnosis: Obesity type 2 DM    Physical Findings: back pain - going to physical therapy for this     Client Age and Gender: 59 year old female      Labs:   No components found for: \"HGBA1C\"  Triglycerides   Date Value Ref Range Status   04/18/2024 139 30 - 149 mg/dL Final     Comment:     Reference interval for fasting triglycerides  Desirable: <150 mg/dL  Borderline: 150-199 mg/dL  High: 200-499 mg/dL  Very High: >=500 mg/dL           LDL Cholesterol   Date Value Ref Range Status   04/18/2024 136 (H) <100 mg/dL Final     Comment:     Optimal            <100 mg/dL   Near/Above OptimaL 100-129 mg/dL   Borderline High    130-159 mg/dL   High               160-189 mg/dL    Very High          >190 mg/dL          HDL Cholesterol   Date Value Ref Range Status   04/18/2024 35 (L) 40 - 59 mg/dL Final     Comment:     Interpretive Information:   An HDL cholesterol <40 mg/dL is low and constitutes a coronary heart disease risk factor. An HDL cholesterol >60 mg/dL is a negative risk factor for coronary heart disease.         AST   Date Value Ref Range Status   07/20/2024 52 (H) <34 U/L Final     ALT   Date Value Ref Range Status   07/20/2024 79 (H) 10 - 49 U/L Final         Height:  Ht Readings from Last 1 Encounters:   07/23/24 5' 4\" (1.626 m)       Weight:   Wt Readings from Last 2 Encounters:   07/23/24 (!) 311 lb   07/20/24 (!) 311 lb       BMI Readings from Last 1 Encounters:   07/23/24 53.38 kg/m²         Diet/Weight History: used to live in [facility], ate fresh fruits/veg but not much protein     Current Diet:     Food/Beverage Intake:   Breakfast: oatmeal   Lunch: tomato cucumber and spinach with oil big bowl   Dinner: chinese food: fish, ribs,cutlets   Snacks: tea, fruit  Beverages: water     Meal pattern: 3 small meals/d    Number of meals/week eaten at restaurants: -        Alcohol Intake: - ozs/wk    Estimated current caloric intake:  - cals/d    Estimated caloric needs for weight loss: 4996-1939 cals/d for 1-2 pounds/week weight loss    Physical Activity: herniated disk, walks in the park, physical therapy     Food Journal: -    Spent 15 minutes in consultation with the patient.      Nutrition Intervention/Education:  Comprehensive nutrition education and evaluation provided for weight loss. Reviewed diet and weight history with ipad  to translate.  Daughter also present at visit. Pt reports history of food insecurity while living in [facility], gave kids most of the protein sources and had very little for herself.  Expressed concern about her current health - reports GI bleed recently, went to emergency room and cannot get into see GI until September.  Pt also concerned about blood sugar.  Addressed nutrition changes that can help digestion and blood glucose control.  Discussed eating habits for sustainable weight loss such as adequate protein with every meal, protein+produce with meals and snacks, and mindful eating techniques. Pt emotional this visit/motivated to add protein to every meal. Did not make goals this visit - she would like to continue to follow up with a dietitian to check on how things are going. Emailed daughter meal/snack ideas.       Monitoring/Evaluation:  Follow up appt scheduled with dietitian          Selena Kc RD, [place]

## 2024-07-31 ENCOUNTER — OFFICE VISIT (OUTPATIENT)
Dept: INTERNAL MEDICINE CLINIC | Facility: CLINIC | Age: 59
End: 2024-07-31
Payer: MEDICAID

## 2024-07-31 DIAGNOSIS — E66.01 CLASS 3 SEVERE OBESITY WITH SERIOUS COMORBIDITY AND BODY MASS INDEX (BMI) OF 50.0 TO 59.9 IN ADULT, UNSPECIFIED OBESITY TYPE (HCC): ICD-10-CM

## 2024-07-31 DIAGNOSIS — K76.0 HEPATIC STEATOSIS: ICD-10-CM

## 2024-07-31 DIAGNOSIS — E78.5 HYPERLIPIDEMIA, UNSPECIFIED HYPERLIPIDEMIA TYPE: ICD-10-CM

## 2024-07-31 DIAGNOSIS — R73.03 PREDIABETES: ICD-10-CM

## 2024-07-31 DIAGNOSIS — E55.9 VITAMIN D DEFICIENCY: ICD-10-CM

## 2024-07-31 DIAGNOSIS — Z51.81 ENCOUNTER FOR THERAPEUTIC DRUG LEVEL MONITORING: Primary | ICD-10-CM

## 2024-07-31 DIAGNOSIS — I10 PRIMARY HYPERTENSION: ICD-10-CM

## 2024-07-31 PROCEDURE — 97802 MEDICAL NUTRITION INDIV IN: CPT

## 2024-08-01 ENCOUNTER — MED REC SCAN ONLY (OUTPATIENT)
Dept: PHYSICAL MEDICINE AND REHAB | Facility: CLINIC | Age: 59
End: 2024-08-01

## 2024-08-03 ENCOUNTER — HOSPITAL ENCOUNTER (OUTPATIENT)
Dept: MAMMOGRAPHY | Age: 59
Discharge: HOME | End: 2024-08-03
Payer: MEDICAID

## 2024-08-03 ENCOUNTER — HOSPITAL ENCOUNTER (OUTPATIENT)
Dept: ULTRASOUND IMAGING | Age: 59
Discharge: HOME | End: 2024-08-03
Payer: MEDICAID

## 2024-08-03 DIAGNOSIS — Z12.31 BREAST CANCER SCREENING BY MAMMOGRAM: ICD-10-CM

## 2024-08-03 DIAGNOSIS — R10.2 PELVIC PAIN: ICD-10-CM

## 2024-08-03 PROCEDURE — 76830 TRANSVAGINAL US NON-OB: CPT

## 2024-08-03 PROCEDURE — 77063 BREAST TOMOSYNTHESIS BI: CPT

## 2024-08-03 PROCEDURE — 76856 US EXAM PELVIC COMPLETE: CPT

## 2024-08-03 PROCEDURE — 77067 SCR MAMMO BI INCL CAD: CPT

## 2024-08-09 ENCOUNTER — HOSPITAL ENCOUNTER (OUTPATIENT)
Age: 59
Discharge: HOME | End: 2024-08-09
Payer: MEDICAID

## 2024-08-09 ENCOUNTER — OFFICE VISIT (OUTPATIENT)
Facility: CLINIC | Age: 59
End: 2024-08-09
Payer: MEDICAID

## 2024-08-09 VITALS
SYSTOLIC BLOOD PRESSURE: 140 MMHG | TEMPERATURE: 97 F | OXYGEN SATURATION: 100 % | DIASTOLIC BLOOD PRESSURE: 81 MMHG | HEART RATE: 68 BPM | RESPIRATION RATE: 18 BRPM

## 2024-08-09 VITALS
DIASTOLIC BLOOD PRESSURE: 90 MMHG | RESPIRATION RATE: 18 BRPM | HEART RATE: 80 BPM | BODY MASS INDEX: 50.02 KG/M2 | WEIGHT: 293 LBS | HEIGHT: 64 IN | SYSTOLIC BLOOD PRESSURE: 128 MMHG

## 2024-08-09 DIAGNOSIS — I10 PRIMARY HYPERTENSION: ICD-10-CM

## 2024-08-09 DIAGNOSIS — R21 RASH: Primary | ICD-10-CM

## 2024-08-09 DIAGNOSIS — L50.9 HIVES: ICD-10-CM

## 2024-08-09 DIAGNOSIS — E78.5 HYPERLIPIDEMIA, UNSPECIFIED HYPERLIPIDEMIA TYPE: ICD-10-CM

## 2024-08-09 DIAGNOSIS — E11.9 TYPE 2 DIABETES MELLITUS WITHOUT COMPLICATION, WITHOUT LONG-TERM CURRENT USE OF INSULIN (HCC): ICD-10-CM

## 2024-08-09 DIAGNOSIS — E66.01 CLASS 3 SEVERE OBESITY WITH SERIOUS COMORBIDITY AND BODY MASS INDEX (BMI) OF 50.0 TO 59.9 IN ADULT, UNSPECIFIED OBESITY TYPE (HCC): ICD-10-CM

## 2024-08-09 DIAGNOSIS — K76.0 HEPATIC STEATOSIS: ICD-10-CM

## 2024-08-09 DIAGNOSIS — G47.33 OSA ON CPAP: ICD-10-CM

## 2024-08-09 DIAGNOSIS — Z51.81 ENCOUNTER FOR THERAPEUTIC DRUG LEVEL MONITORING: Primary | ICD-10-CM

## 2024-08-09 PROCEDURE — 99213 OFFICE O/P EST LOW 20 MIN: CPT

## 2024-08-09 PROCEDURE — 99214 OFFICE O/P EST MOD 30 MIN: CPT

## 2024-08-09 PROCEDURE — 99214 OFFICE O/P EST MOD 30 MIN: CPT | Performed by: PHYSICIAN ASSISTANT

## 2024-08-09 RX ORDER — DIPHENHYDRAMINE HCL 25 MG
50 CAPSULE ORAL EVERY 6 HOURS PRN
Qty: 15 CAPSULE | Refills: 0 | Status: SHIPPED | OUTPATIENT
Start: 2024-08-09 | End: 2024-08-14

## 2024-08-09 RX ORDER — TIRZEPATIDE 2.5 MG/.5ML
2.5 INJECTION, SOLUTION SUBCUTANEOUS WEEKLY
Qty: 2 ML | Refills: 0 | Status: SHIPPED | OUTPATIENT
Start: 2024-08-09

## 2024-08-09 RX ORDER — TRIAMCINOLONE ACETONIDE 1 MG/G
CREAM TOPICAL 2 TIMES DAILY
Qty: 15 G | Refills: 0 | Status: SHIPPED | OUTPATIENT
Start: 2024-08-09 | End: 2024-08-14

## 2024-08-09 RX ORDER — METFORMIN HYDROCHLORIDE 750 MG/1
750 TABLET, EXTENDED RELEASE ORAL DAILY
Qty: 90 TABLET | Refills: 2 | Status: SHIPPED | OUTPATIENT
Start: 2024-08-09

## 2024-08-09 NOTE — ED PROVIDER NOTES
Patient Seen in: [facility] [place]      History     Chief Complaint   Patient presents with    Skin     Stated Complaint: Rash    Subjective:   HPI    58 yo female with hx as listed below here with c/o a rash to the L breast and an area on the R hand. Noticed 6 days ago. Describes the area as itchy. Pt notes she feels itching over her back and arms as well. Denies fevers. Has not taken anything for the symptoms.     Objective:   Past Medical History:    Amenorrhea    Anxiety    Diabetes (HCC)    High blood pressure    Hypertension    Mixed hyperlipidemia    Obesity    Scoliosis    Shortness of breath    Sleep apnea    Varicose vein              Past Surgical History:   Procedure Laterality Date      1989                Social History     Socioeconomic History    Marital status:    Tobacco Use    Smoking status: Never    Smokeless tobacco: Never   Vaping Use    Vaping status: Never Used   Substance and Sexual Activity    Alcohol use: Not Currently    Drug use: Never    Sexual activity: Not Currently   Other Topics Concern    Caffeine Concern No    Exercise No    Seat Belt No    Special Diet No    Stress Concern No    Weight Concern Yes    [place]rew up on a farm Yes    History of tanning Yes    Outdoor occupation Yes    Breast feeding No    Reaction to local anesthetic No    Pt has a pacemaker No    Pt has a defibrillator No     Social Determinants of Health     Food Insecurity: Low Risk  (2024)    Received from [facility]    Food Insecurity     Have there been times that your food ran out, and you didn't have money to get more?: No     Are there times that you worry that this might happen?: No   Transportation Needs: Low Risk  (2024)    Received from [facility]    Transportation Needs     Do you have trouble getting transportation to medical appointments?: No              Review of Systems    Positive for stated Chief Complaint: Skin    Other  systems are as noted in HPI.  Constitutional and vital signs reviewed.      All other systems reviewed and negative except as noted above.    Physical Exam     ED Triage Vitals [08/09/24 1257]   /81   Pulse 68   Resp 18   Temp 97 °F (36.1 °C)   Temp src Temporal   SpO2 100 %   O2 Device None (Room air)       Current Vitals:   Vital Signs  BP: 140/81  Pulse: 68  Resp: 18  Temp: 97 °F (36.1 °C)  Temp src: Temporal    Oxygen Therapy  SpO2: 100 %  O2 Device: None (Room air)            Physical Exam  Vitals and nursing note reviewed.   Constitutional:       General: She is not in acute distress.  HENT:      Head: Normocephalic and atraumatic.      Right Ear: External ear normal.      Left Ear: External ear normal.      Nose: Nose normal.      Mouth/Throat:      Mouth: Mucous membranes are moist.   Eyes:      Extraocular Movements: Extraocular movements intact.      Pupils: Pupils are equal, round, and reactive to light.   Cardiovascular:      Rate and Rhythm: Normal rate.   Pulmonary:      Effort: Pulmonary effort is normal.   Abdominal:      General: Abdomen is flat.   Musculoskeletal:         General: Normal range of motion.      Cervical back: Normal range of motion.   Skin:     General: Skin is warm.      Findings: Rash (there is an erythematous lesion to the R breast. no induration) present.   Neurological:      General: No focal deficit present.      Mental Status: She is alert and oriented to person, place, and time.   Psychiatric:         Mood and Affect: Mood normal.         Behavior: Behavior normal.               ED Course   Labs Reviewed - No data to display      60 yo female here with c/o pruritic lesion to the L breast which started 6 days ago  Ddx- allergic reaction, insect bite vs. Sting, cellulitis    PE findings most suggestive of insect bite versus irritant dermatitis.  Triamcinolone Rx'd, rec'd oral benadryl              MDM                                         Medical Decision  Making      Disposition and Plan     Clinical Impression:  1. Rash         Disposition:  Discharge  8/9/2024  1:35 pm    Follow-up:  Estelle Multani MD  [address] [place] [address] [phone]                Medications Prescribed:  Discharge Medication List as of 8/9/2024  1:35 PM        START taking these medications    Details   !! triamcinolone 0.1 % External Cream Apply topically 2 (two) times daily for 5 days., Normal, Disp-15 g, R-0      diphenhydrAMINE 25 MG Oral Cap Take 2 capsules (50 mg total) by mouth every 6 (six) hours as needed for Itching., Normal, Disp-15 capsule, R-0       !! - Potential duplicate medications found. Please discuss with provider.

## 2024-08-09 NOTE — PROGRESS NOTES
HISTORY OF PRESENT ILLNESS  Chief Complaint   Patient presents with    Weight Check     -5lbs       Judi White is a 59 year old female here for follow up in medical weight loss program.   An interpretor was used for the visit  -5lbs  Compliant on metformin  Denies chest pain, shortness of breath, dizziness, blurred vision, headache, paresthesia, nausea/vomiting.   PCP started her on Rybelsus, has been feeling itching skin with that  Met with dietician  Trying to get more protein  Not feeling full sooner or smaller portion   Exercise/Activity: back pain and hernia, doing things around the house, but nothing too strenuous, going to PT  Nutrition: 24 hour food log reviewed, eating regular meals, +protein  Stress: 8/10  Sleep: 7 hours/night       Wt Readings from Last 6 Encounters:   08/17/24 (!) 313 lb (142 kg)   08/13/24 (!) 313 lb (142 kg)   08/09/24 (!) 314 lb (142.4 kg)   07/23/24 (!) 311 lb (141.1 kg)   07/20/24 (!) 311 lb (141.1 kg)   07/17/24 (!) 312 lb (141.5 kg)            Breakfast Lunch Dinner Snacks Fluids   Reviewed           REVIEW OF SYSTEMS  GENERAL HEALTH: feels well otherwise, denied any fevers chills or night sweats   RESPIRATORY: denies shortness of breath   CARDIOVASCULAR: denies chest pain  GI: denies abdominal pain    EXAM  /90   Pulse 80   Resp 18   Ht 5' 4\" (1.626 m)   Wt (!) 314 lb (142.4 kg)   BMI 53.90 kg/m²   GENERAL: well developed, well nourished,in no apparent distress, A/O x3  SKIN: no rashes,no suspicious lesions  HEENT: atraumatic, normocephalic, OP-clear, PERRL  NECK: supple,no adenopathy  LUNGS: clear to auscultation bilaterally   CARDIO: RRR without murmur  GI: good BS's,NT/ND, no masses or HSM  EXTREMITIES: no cyanosis, no clubbing, no edema    Lab Results   Component Value Date    WBC 7.0 07/13/2024    RBC 4.65 07/13/2024    HGB 12.6 07/13/2024    HCT 39.6 07/13/2024    MCV 85.2 07/13/2024    MCH 27.1 07/13/2024    MCHC 31.8 07/13/2024    RDW 14.6 07/13/2024     .0 07/13/2024     Lab Results   Component Value Date    GLU 95 07/20/2024    BUN 11 07/20/2024    BUNCREA 15.7 07/20/2024    CREATSERUM 0.70 07/20/2024    ANIONGAP 4 07/20/2024    CA 10.0 07/20/2024    OSMOCALC 287 07/20/2024    ALKPHO 100 07/20/2024    AST 52 (H) 07/20/2024    ALT 79 (H) 07/20/2024    BILT 0.5 07/20/2024    TP 8.4 (H) 07/20/2024    ALB 4.8 07/20/2024    GLOBULIN 3.6 (H) 07/20/2024     07/20/2024    K 4.2 07/20/2024     07/20/2024    CO2 28.0 07/20/2024     Lab Results   Component Value Date     (H) 07/17/2024    A1C 6.5 (H) 07/17/2024     Lab Results   Component Value Date    CHOLEST 196 04/18/2024    TRIG 139 04/18/2024    HDL 35 (L) 04/18/2024     (H) 04/18/2024    VLDL 25 04/18/2024    NONHDLC 161 (H) 04/18/2024     Lab Results   Component Value Date    T4F 1.2 04/18/2024    TSH 2.148 07/17/2024     Lab Results   Component Value Date    B12 634 09/23/2023     Lab Results   Component Value Date    VITD 38.1 07/17/2024       Current Outpatient Medications on File Prior to Visit   Medication Sig Dispense Refill    famotidine (PEPCID) 40 MG Oral Tab Take 1 tablet (40 mg total) by mouth at bedtime. 30 tablet 1    [START ON 8/19/2024] Semaglutide (RYBELSUS) 7 MG Oral Tab Take 7 mg by mouth daily. 90 tablet 3    atorvastatin 10 MG Oral Tab Take 1 tablet (10 mg total) by mouth nightly. 90 tablet 3    ergocalciferol 1.25 MG (16221 UT) Oral Cap Take 1 capsule (50,000 Units total) by mouth once a week. 12 capsule 1    Fluocinonide 0.05 % External Solution Apply twice daily  Monday-Friday. Take weekends off. Use on scalp 60 mL 12    hydrocortisone 2.5 % External Cream Apply rectally twice daily and after each bowel movement 28 g 0    triamcinolone 0.1 % External Cream Apply topically 2 (two) times daily as needed. 60 g 3    Potassium Chloride ER 10 MEQ Oral Tab CR Take 1 tab po daily  together  with  furosemide 90 tablet 0    furosemide 20 MG Oral Tab Take  1 tab po daily 90  tablet 0    ergocalciferol 1.25 MG (95586 UT) Oral Cap Take 1 capsule (50,000 Units total) by mouth once a week. With food for 12 weeks total then begin OTC Vitamin D at 2000 units daily with food thereafter 12 capsule 0    mometasone 0.1 % External Ointment Apply 1 Application topically daily. Apply to entrance of ear canal before sleep for `10-14 days 1 each 0    PEG 3350-KCl-Na Bicarb-NaCl (TRILYTE) 420 g Oral Recon Soln Take prep as directed by gastro office. May substitute with Trilyte/generic equivalent if needed. 4000 mL 0    Semaglutide (RYBELSUS) 3 MG Oral Tab Take 3 mg by mouth daily. 30 tablet 0    ondansetron (ZOFRAN) 4 mg tablet Take 1 tablet (4 mg total) by mouth every 12 (twelve) hours as needed for Nausea. 20 tablet 0    nortriptyline 25 MG Oral Cap Start with 1 pill QHS, for 1 week, then 2 pills qhs foor another week, then 3 pills qhs 90 capsule 3    metoprolol succinate ER 25 MG Oral Tablet 24 Hr Take 1 tablet (25 mg total) by mouth daily. 90 tablet 1     No current facility-administered medications on file prior to visit.       ASSESSMENT  Analyzed weight data:       Diagnoses and all orders for this visit:    Encounter for therapeutic drug level monitoring    Class 3 severe obesity with serious comorbidity and body mass index (BMI) of 50.0 to 59.9 in adult, unspecified obesity type (HCC)    Type 2 diabetes mellitus without complication, without long-term current use of insulin (HCC)    Primary hypertension    Hepatic steatosis    Hyperlipidemia, unspecified hyperlipidemia type    EVANGELIST on CPAP    Hives  -     Allergy Referral - In Network    Other orders  -     metFORMIN  MG Oral Tablet 24 Hr; Take 1 tablet (750 mg total) by mouth daily.  -     Tirzepatide (MOUNJARO) 2.5 MG/0.5ML Subcutaneous Solution Pen-injector; Inject 2.5 mg into the skin once a week.        PLAN  Initial Weight: 319lbs  Initial Weight Date: 4/15/24  Today's Weight: 314lbs  5% Goal: 15.95lbs  10% Goal: 31.9lbs  Total  Weight Loss: Net loss 5lbs    Discontinue Rybelsus  Will continue metformin - advised side effects and adverse effects of medication   Will begin Mounjaro 2.5mg weekly - denies any personal or family history of pancreatitis, pancreatic cancer, thyroid cancer, MEN2 - discussed MOA, advised side effects and adverse effects of medication.  Referral for allergist for hives  DM - stable continue current medications, low carb diet  HLD - stable on current medication atorvastatin, will continue, will continue to work on lifestyle modifications  CPAP compliance  HTN - blood pressure well controlled on current medication - advised signs and symptoms of hypotension and will monitor with continued weight loss  Fatty liver - low carb, low fat diet  Consistency with logging foods - protein and produce  Nutrition: low carb diet/ recommended to eat breakfast daily/ regular protein intake  Medication use and side effects reviewed with patient.  Medication contraindications: EKG prior to stimulant  Follow up with dietitian and psychologist as recommended.  Discussed the role of sleep and stress in weight management.  Counseled on comprehensive weight loss plan including attention to nutrition, exercise and behavior/stress management for success. See patient instruction below for more details.  Discussed strategies to overcome barriers to successful weight loss and weight maintenance  FITTE: ACSM recommendations (150-300 minutes/ week in active weight loss)   Weight Loss consent to treat reviewed and signed       NOTE TO PATIENT: The 21st Century Cures Act makes clinical notes like these available to patients in the interest of transparency. Clinical notes are medical documents used by physicians and care providers to communicate with each other. These documents include medical language and terminology, abbreviations, and treatment information that may sound technical and at times possibly unfamiliar. In addition, at times, the  verbiage may appear blunt or direct. These documents are one tool providers use to communicate relevant information and clinical opinions of the care providers in a way that allows common understanding of the clinical context.     There are no Patient Instructions on file for this visit.    No follow-ups on file.    Patient verbalizes understanding.    Alannah Jackson PA-C  8/9/2024

## 2024-08-09 NOTE — ED INITIAL ASSESSMENT (HPI)
Patient states she had a mammogram done 6 days ago and noticed large red area on left breast that evening. Now with itching \"all over\" with rash.

## 2024-08-12 ENCOUNTER — TELEPHONE (OUTPATIENT)
Dept: INTERNAL MEDICINE CLINIC | Facility: CLINIC | Age: 59
End: 2024-08-12

## 2024-08-12 NOTE — TELEPHONE ENCOUNTER
Call from Melvina, (on [facility], says she is future daughter in law).  Seeking appointment to follow up [facility] for rash and hives on 8/09/2024. Wants to get blood testing for allergies.  Appointment made.  Future Appointments   Date Time Provider Department Center   8/13/2024  7:15 AM Eyad Gonzalez MD ECSCHDERM [facility] [place]   8/13/2024  4:20 PM Chata Leigh, APRN [id number] [facility] [place]   8/17/2024  9:00 AM Carrie Finnegan RD [facility] DIABETES EM [facility]   8/22/2024  9:30 AM [facility] RM4 VAS [facility] [place]   8/29/2024  8:30 AM Chantel De La Rosa, DO PM&R [place] [facility] [place]   8/29/2024  3:00 PM Tresa Nunez RD EMGWEI EM[facility] [address]   12/4/2024 11:40 AM Alannah Jackson, PASohaC [id number] [facility] [address]   12/27/2024 10:00 AM SAMEER, MAGY ECCFHGIPROC None

## 2024-08-12 NOTE — TELEPHONE ENCOUNTER
Scheduled for:  Colonoscopy [id number]  Provider Name:  Dr. Mayer   Date:  12/27/24  Location:    [facility]  Sedation:  Mac  Time:  10:00 (pt is aware that ENDO will call the day before to confirm arrival time)  Prep:  trilyte  Buy over the counter dulcolax laxative, and take one tablet daily for 3 days prior to drinking the bowel prep.   Meds/Allergies Reconciled?:  Physician reviewed   Diagnosis with codes:  BRBPR K62.5   Was patient informed to call insurance with codes (Y/N):  Yes, I confirmed Medicaid  insurance with the patient.   Referral sent?:  Referral was sent at the time of electronic surgical scheduling.  [facility] or [facility] notified?:  I sent an electronic request to Endo Scheduling and received a confirmation today.   Medication Orders:  This patient verbally confirmed that she is not taking:   Iron, blood thinners, BP meds, and is not diabetic   Not latex allergy, Not PCN allergy and does not have a pacemaker  Misc Orders:  I discussed the prep instructions with the patient which she verbally understood and is aware that I will mychart the instructions today.  Further instructions given by staff:     Hold semaglutide 7 days prior to procedure

## 2024-08-13 ENCOUNTER — OFFICE VISIT (OUTPATIENT)
Dept: DERMATOLOGY CLINIC | Facility: CLINIC | Age: 59
End: 2024-08-13
Payer: MEDICAID

## 2024-08-13 ENCOUNTER — OFFICE VISIT (OUTPATIENT)
Dept: INTERNAL MEDICINE CLINIC | Facility: CLINIC | Age: 59
End: 2024-08-13
Payer: MEDICAID

## 2024-08-13 VITALS
DIASTOLIC BLOOD PRESSURE: 88 MMHG | HEART RATE: 90 BPM | HEIGHT: 64 IN | WEIGHT: 293 LBS | SYSTOLIC BLOOD PRESSURE: 139 MMHG | BODY MASS INDEX: 50.02 KG/M2

## 2024-08-13 DIAGNOSIS — L63.9 ALOPECIA AREATA: Primary | ICD-10-CM

## 2024-08-13 DIAGNOSIS — L20.9 ATOPIC DERMATITIS, UNSPECIFIED TYPE: Primary | ICD-10-CM

## 2024-08-13 PROCEDURE — 99213 OFFICE O/P EST LOW 20 MIN: CPT | Performed by: STUDENT IN AN ORGANIZED HEALTH CARE EDUCATION/TRAINING PROGRAM

## 2024-08-13 PROCEDURE — 99213 OFFICE O/P EST LOW 20 MIN: CPT | Performed by: NURSE PRACTITIONER

## 2024-08-13 PROCEDURE — 11900 INJECT SKIN LESIONS </W 7: CPT | Performed by: STUDENT IN AN ORGANIZED HEALTH CARE EDUCATION/TRAINING PROGRAM

## 2024-08-13 RX ORDER — KETOCONAZOLE 20 MG/G
CREAM TOPICAL
Qty: 30 G | Refills: 0 | Status: SHIPPED | OUTPATIENT
Start: 2024-08-13

## 2024-08-13 NOTE — PROGRESS NOTES
August 13, 2024    Established patient     Referred by:   No referring provider defined for this encounter.     CHIEF COMPLAINT: Hair loss f/u    HISTORY OF PRESENT ILLNESS: .    1. Alopecia areata  Location: Scalp   Duration: 2 months  Signs and symptoms: Hair shedding  Current treatment: Triamcinolone kenalog injection  Past treatments: Lidex 0.05 solution        DERM HISTORY:  History of skin cancer: No  History of chronic skin disease/condition: No    FAMILY HISTORY:  History of melanoma: No  History of chronic skin disease/condition: No    History/Other:    REVIEW OF SYSTEMS:  Constitutional: Denies fever, chills, unintentional weight loss.   Skin as per HPI    PAST MEDICAL HISTORY:  Past Medical History:    Amenorrhea    Anxiety    Diabetes (HCC)    High blood pressure    Hypertension    Mixed hyperlipidemia    Obesity    Scoliosis    Shortness of breath    Sleep apnea    Varicose vein       Medications  Current Outpatient Medications   Medication Sig Dispense Refill    metFORMIN  MG Oral Tablet 24 Hr Take 1 tablet (750 mg total) by mouth daily. 90 tablet 2    Tirzepatide (MOUNJARO) 2.5 MG/0.5ML Subcutaneous Solution Pen-injector Inject 2.5 mg into the skin once a week. 2 mL 0    triamcinolone 0.1 % External Cream Apply topically 2 (two) times daily for 5 days. 15 g 0    diphenhydrAMINE 25 MG Oral Cap Take 2 capsules (50 mg total) by mouth every 6 (six) hours as needed for Itching. 15 capsule 0    PEG 3350-KCl-Na Bicarb-NaCl (TRILYTE) 420 g Oral Recon Soln Take prep as directed by gastro office. May substitute with Trilyte/generic equivalent if needed. (Patient not taking: Reported on 8/9/2024) 4000 mL 0    famotidine (PEPCID) 40 MG Oral Tab Take 1 tablet (40 mg total) by mouth at bedtime. 30 tablet 1    Semaglutide (RYBELSUS) 3 MG Oral Tab Take 3 mg by mouth daily. (Patient not taking: Reported on 7/23/2024) 30 tablet 0    [START ON 8/19/2024] Semaglutide (RYBELSUS) 7 MG Oral Tab Take 7 mg by mouth  daily. 90 tablet 3    atorvastatin 10 MG Oral Tab Take 1 tablet (10 mg total) by mouth nightly. 90 tablet 3    ergocalciferol 1.25 MG (94808 UT) Oral Cap Take 1 capsule (50,000 Units total) by mouth once a week. 12 capsule 1    Fluocinonide 0.05 % External Solution Apply twice daily  Monday-Friday. Take weekends off. Use on scalp 60 mL 12    hydrocortisone 2.5 % External Cream Apply rectally twice daily and after each bowel movement 28 g 0    ondansetron (ZOFRAN) 4 mg tablet Take 1 tablet (4 mg total) by mouth every 12 (twelve) hours as needed for Nausea. (Patient not taking: Reported on 8/9/2024) 20 tablet 0    triamcinolone 0.1 % External Cream Apply topically 2 (two) times daily as needed. 60 g 3    Potassium Chloride ER 10 MEQ Oral Tab CR Take 1 tab po daily  together  with  furosemide 90 tablet 0    furosemide 20 MG Oral Tab Take  1 tab po daily 90 tablet 0    ergocalciferol 1.25 MG (65788 UT) Oral Cap Take 1 capsule (50,000 Units total) by mouth once a week. With food for 12 weeks total then begin OTC Vitamin D at 2000 units daily with food thereafter 12 capsule 0    mometasone 0.1 % External Ointment Apply 1 Application topically daily. Apply to entrance of ear canal before sleep for `10-14 days 1 each 0    nortriptyline 25 MG Oral Cap Start with 1 pill QHS, for 1 week, then 2 pills qhs foor another week, then 3 pills qhs (Patient not taking: Reported on 8/9/2024) 90 capsule 3    metoprolol succinate ER 25 MG Oral Tablet 24 Hr Take 1 tablet (25 mg total) by mouth daily. (Patient not taking: Reported on 8/9/2024) 90 tablet 1       Objective:    PHYSICAL EXAM:  General: awake, alert, no acute distress  Skin: Skin exam was performed today including the following: scalp. Pertinent findings include:   - with alopetic patch    ASSESSMENT & PLAN:  Pathophysiology of diagnoses discussed with patient.  Therapeutic options reviewed. Risks, benefits, and alternatives discussed with patient. Instructions reviewed at  length.    #Alopecia areata   - Intralesional Kenalog (triamcinolone) injection today     Concentration:  mg/mL  Site: scalp  Total volume injected: 0.2cc    Patient was counseled on the possible side effects of injection: pain at site, infection, atrophy of skin, systemic absorption, hypopigmentation, loss of hair.  Questions if any were addressed before proceeding with prep of the site with rubbing alcohol.  Kenalog was injected with sterile syringe and 25 g needle.  Patient tolerated well.     Return to clinic: 3cc or sooner if something concerning arises     Eyad Gonzalez MD

## 2024-08-14 NOTE — PROGRESS NOTES
Judi White is a 59 year old female.  HPI:   Pt reports erythematous rash on breast. Would like derm referral. Was seen in UC but the topical steroid is making it worse. Very mild itching reported. No fever or chills. She would like a referral to allergist as well for the rash.   Current Outpatient Medications   Medication Sig Dispense Refill    ketoconazole 2 % External Cream Apply to affected area twice daily 30 g 0    metFORMIN  MG Oral Tablet 24 Hr Take 1 tablet (750 mg total) by mouth daily. 90 tablet 2    Tirzepatide (MOUNJARO) 2.5 MG/0.5ML Subcutaneous Solution Pen-injector Inject 2.5 mg into the skin once a week. 2 mL 0    triamcinolone 0.1 % External Cream Apply topically 2 (two) times daily for 5 days. 15 g 0    diphenhydrAMINE 25 MG Oral Cap Take 2 capsules (50 mg total) by mouth every 6 (six) hours as needed for Itching. 15 capsule 0    PEG 3350-KCl-Na Bicarb-NaCl (TRILYTE) 420 g Oral Recon Soln Take prep as directed by gastro office. May substitute with Trilyte/generic equivalent if needed. 4000 mL 0    famotidine (PEPCID) 40 MG Oral Tab Take 1 tablet (40 mg total) by mouth at bedtime. 30 tablet 1    Semaglutide (RYBELSUS) 3 MG Oral Tab Take 3 mg by mouth daily. 30 tablet 0    [START ON 8/19/2024] Semaglutide (RYBELSUS) 7 MG Oral Tab Take 7 mg by mouth daily. 90 tablet 3    atorvastatin 10 MG Oral Tab Take 1 tablet (10 mg total) by mouth nightly. 90 tablet 3    ergocalciferol 1.25 MG (59874 UT) Oral Cap Take 1 capsule (50,000 Units total) by mouth once a week. 12 capsule 1    Fluocinonide 0.05 % External Solution Apply twice daily  Monday-Friday. Take weekends off. Use on scalp 60 mL 12    hydrocortisone 2.5 % External Cream Apply rectally twice daily and after each bowel movement 28 g 0    ondansetron (ZOFRAN) 4 mg tablet Take 1 tablet (4 mg total) by mouth every 12 (twelve) hours as needed for Nausea. 20 tablet 0    triamcinolone 0.1 % External Cream Apply topically 2 (two) times daily as  needed. 60 g 3    Potassium Chloride ER 10 MEQ Oral Tab CR Take 1 tab po daily  together  with  furosemide 90 tablet 0    furosemide 20 MG Oral Tab Take  1 tab po daily 90 tablet 0    ergocalciferol 1.25 MG (15947 UT) Oral Cap Take 1 capsule (50,000 Units total) by mouth once a week. With food for 12 weeks total then begin OTC Vitamin D at 2000 units daily with food thereafter 12 capsule 0    mometasone 0.1 % External Ointment Apply 1 Application topically daily. Apply to entrance of ear canal before sleep for `10-14 days 1 each 0    nortriptyline 25 MG Oral Cap Start with 1 pill QHS, for 1 week, then 2 pills qhs foor another week, then 3 pills qhs 90 capsule 3    metoprolol succinate ER 25 MG Oral Tablet 24 Hr Take 1 tablet (25 mg total) by mouth daily. 90 tablet 1      Past Medical History:    Amenorrhea    Anxiety    Diabetes (HCC)    High blood pressure    Hypertension    Mixed hyperlipidemia    Obesity    Scoliosis    Shortness of breath    Sleep apnea    Varicose vein      Social History:  Social History     Socioeconomic History    Marital status:    Tobacco Use    Smoking status: Never    Smokeless tobacco: Never   Vaping Use    Vaping status: Never Used   Substance and Sexual Activity    Alcohol use: Not Currently    Drug use: Never    Sexual activity: Not Currently   Other Topics Concern    Caffeine Concern No    Exercise No    Seat Belt No    Special Diet No    Stress Concern No    Weight Concern Yes    Grew up on a farm Yes    History of tanning Yes    Outdoor occupation Yes    Breast feeding No    Reaction to local anesthetic No    Pt has a pacemaker No    Pt has a defibrillator No     Social Determinants of Health     Food Insecurity: Low Risk  (4/20/2024)    Received from [facility]    Food Insecurity     Have there been times that your food ran out, and you didn't have money to get more?: No     Are there times that you worry that this might happen?: No   Transportation  Needs: Low Risk  (4/20/2024)    Received from [facility]    Transportation Needs     Do you have trouble getting transportation to medical appointments?: No        REVIEW OF SYSTEMS:   Review of Systems   Constitutional:  Negative for activity change, appetite change, fatigue, fever and unexpected weight change.   HENT:  Negative for congestion, dental problem and sore throat.    Eyes:  Negative for visual disturbance.   Respiratory:  Negative for cough, chest tightness, shortness of breath and wheezing.    Cardiovascular:  Negative for chest pain, palpitations and leg swelling.   Gastrointestinal:  Negative for abdominal pain, constipation, diarrhea, nausea and vomiting.   Endocrine: Negative.    Genitourinary:  Negative for difficulty urinating, frequency and menstrual problem.   Musculoskeletal:  Negative for arthralgias and back pain.   Skin:  Negative for color change, pallor, rash and wound.   Neurological:  Negative for dizziness, seizures, light-headedness, numbness and headaches.   Psychiatric/Behavioral:  Negative for behavioral problems, dysphoric mood and suicidal ideas. The patient is not nervous/anxious.           EXAM:   /88 (BP Location: Left arm, Patient Position: Sitting, Cuff Size: adult)   Pulse 90   Ht 5' 4\" (1.626 m)   Wt (!) 313 lb (142 kg)   BMI 53.73 kg/m²     Physical Exam  Vitals reviewed.   Constitutional:       General: She is not in acute distress.     Appearance: She is obese.   Eyes:      Conjunctiva/sclera: Conjunctivae normal.      Pupils: Pupils are equal, round, and reactive to light.   Skin:     General: Skin is warm.      Coloration: Skin is not jaundiced.      Findings: Rash present.      Comments: 6 cm erythematous patch of L breast   Neurological:      Mental Status: She is alert and oriented to person, place, and time.   Psychiatric:         Mood and Affect: Mood normal.         Behavior: Behavior normal.         Judgment: Judgment normal.             ASSESSMENT AND PLAN:   1. Atopic dermatitis, unspecified type  -Stop triamcinolone and start ketoconazole cream.  -Referral to Derm and Allergist per patient request.   - Allergy Referral - In Network  - Derm Referral - In Network       The patient indicates understanding of these issues and agrees to the plan.  The patient is asked to return in 2-4 weeks.     The above note was creating using Dragon speech recognition technology. Please excuse any typos.

## 2024-08-15 ENCOUNTER — TELEPHONE (OUTPATIENT)
Facility: CLINIC | Age: 59
End: 2024-08-15

## 2024-08-15 NOTE — TELEPHONE ENCOUNTER
Patients daughter in law states that the patient was informed that the procedure will be scheduled for October but the patient is scheduled for the end of December. She states that the patient wants to get her procedure done sooner due to her symptoms.

## 2024-08-16 ENCOUNTER — TELEPHONE (OUTPATIENT)
Facility: CLINIC | Age: 59
End: 2024-08-16

## 2024-08-16 ENCOUNTER — TELEPHONE (OUTPATIENT)
Dept: INTERNAL MEDICINE CLINIC | Facility: CLINIC | Age: 59
End: 2024-08-16

## 2024-08-16 DIAGNOSIS — Z51.81 ENCOUNTER FOR THERAPEUTIC DRUG LEVEL MONITORING: Primary | ICD-10-CM

## 2024-08-16 DIAGNOSIS — E66.01 CLASS 3 SEVERE OBESITY WITH SERIOUS COMORBIDITY AND BODY MASS INDEX (BMI) OF 50.0 TO 59.9 IN ADULT, UNSPECIFIED OBESITY TYPE (HCC): ICD-10-CM

## 2024-08-16 DIAGNOSIS — E11.9 TYPE 2 DIABETES MELLITUS WITHOUT COMPLICATION, WITHOUT LONG-TERM CURRENT USE OF INSULIN (HCC): ICD-10-CM

## 2024-08-16 NOTE — TELEPHONE ENCOUNTER
Faxed over visit notes for garments compression ordered on 7/17/24 per EDA Ventura requested from [facility]   Received fax confirmation

## 2024-08-17 ENCOUNTER — HOSPITAL ENCOUNTER (OUTPATIENT)
Dept: ENDOCRINOLOGY | Facility: HOSPITAL | Age: 59
Discharge: HOME | End: 2024-08-17
Attending: NURSE PRACTITIONER
Payer: MEDICAID

## 2024-08-17 VITALS — BODY MASS INDEX: 54 KG/M2 | WEIGHT: 293 LBS

## 2024-08-17 DIAGNOSIS — E11.9 CONTROLLED TYPE 2 DIABETES MELLITUS WITHOUT COMPLICATION, WITHOUT LONG-TERM CURRENT USE OF INSULIN (HCC): Primary | ICD-10-CM

## 2024-08-17 NOTE — PROGRESS NOTES
Medical Nutrition Therapy Assessment    Judi White 2/11/1965 was seen for individual Diabetic Medical Nutrition Therapy:  initial/individual    Date: 8/17/2024   Start time 0900  End time: 1000    Assessment  Pt speaks Ukranian; she is here with her daughter-in-law.  Used language Matthew stinson, #[id number]    When discussing pt's A1c, she stated that she stated \"no one told me I have diabetes.\"  Pt states that she moved here from [facility] and she feels more stress and she does not feel safe to walk outside so she is less active now.    Anthropometrics:  Wt (!) 313 lb   BMI 53.73 kg/m²     Current diabetes medications:  Metformin  \"Waiting to see if Mounjaro will be covered by insurance\"    Labs:  Lab Results   Component Value Date    A1C 6.5 (H) 07/17/2024    A1C 6.6 (H) 04/18/2024    CHOLEST 196 04/18/2024    CHOLEST 208 (H) 06/28/2023     (H) 04/18/2024     (H) 06/28/2023    HDL 35 (L) 04/18/2024    HDL 37 (L) 06/28/2023    NONHDLC 161 (H) 04/18/2024    NONHDLC 171 (H) 06/28/2023    TRIG 139 04/18/2024    TRIG 194 (H) 06/28/2023    BUN 11 07/20/2024    BUN 12 07/13/2024    CREATSERUM 0.70 07/20/2024    CREATSERUM 0.68 07/13/2024       SMBG at home: no      Diet Hx:  (a.m.) Buckwheat- over 1 cp cooked  (mid-day) 2 slices whole grain rustic bread, vegetable soup  (p.m.) fish or chicken and vegetables  (snacks) fruit  (fluids) water    Physical Activity: None       Nutrition Diagnosis  Behavioral and environmental: nutrition and nutrition-related knowledge deficit  Physical activity and function: physical inactivity    Intervention  Comprehensive Nutrition Education Provided:     [x] discussed healthy weight management, glucose management, lipid management, and BP management as related to diabetes   [x] discussed basic meal planning guidelines for diabetes regular mealtime, limited concentrated sweets. Worked on establishing eating pattern/timing of meals and snacks    [x] discussed in depth meal  planning using the healthy eating with diabetes plate method with focus on balanced macronutrient consumption, including identifying foods that are carbohydrates, lean protein, non-starchy vegetables, and heart healthy fats   [x] stressed importance of carbohydrate consistency in each meal   [x] recommended carbohydrate targets of  grams at meals and  grams at snacks   [] educated on label reading   [x] educated on portion control; including use of measuring cups, spoons, or food scale   [] provided suggestions for lower carb, healthy snacks   [] educated on importance of food monitoring and how to use food logs   [] discussed how to handle special occasions, dining out, and eating on the go   [] discussed menu planning, healthy food shopping, cooking tips, and need to pre prepare foods    [] educated on emotional eating and being mindful at meals   []  reviewed other behavior modification strategies    []emphasized the need for small, sustainable changes and working on SMART goals to encourage sustainable behaviors    [] instructions on how to use helpful websites or nutrition/tracking apps reviewed    [] taught to use carbohydrate to insulin ratio and insulin sensitivity factor    [] discussed barriers and overcoming barriers to best achieve meal planning goals    [] discussed Mediterranean diet/DASH diet, as appropriate, to address lipids or BP   [] reviewed renal diet components and how to incorporate this with diabetes meal planning     Education on Increased Physical Activity:  discussed how increased physical activity improves insulin resistance, blood glucose control, and heart health    Monitoring  diet modification/understanding, hemoglobin A1c, and weight trends    Evaluation  Behavioral Goal(s) selected:   Healthy Eating    Support Plan:  The use of Diabetes Websites or Apps (for family to use to help pt)    Plan  Call Carrie Finnegan RD at [phone] (option 3) for problems/concerns.   No  follow-ups on file.    Carrie Finnegan, RD

## 2024-08-19 ENCOUNTER — APPOINTMENT (OUTPATIENT)
Dept: GENERAL RADIOLOGY | Facility: HOSPITAL | Age: 59
End: 2024-08-19
Attending: EMERGENCY MEDICINE
Payer: MEDICAID

## 2024-08-19 ENCOUNTER — HOSPITAL ENCOUNTER (EMERGENCY)
Facility: HOSPITAL | Age: 59
Discharge: HOME | End: 2024-08-19
Attending: EMERGENCY MEDICINE
Payer: MEDICAID

## 2024-08-19 VITALS
HEART RATE: 74 BPM | OXYGEN SATURATION: 95 % | WEIGHT: 293 LBS | RESPIRATION RATE: 19 BRPM | SYSTOLIC BLOOD PRESSURE: 153 MMHG | DIASTOLIC BLOOD PRESSURE: 68 MMHG | TEMPERATURE: 98 F | BODY MASS INDEX: 53 KG/M2

## 2024-08-19 DIAGNOSIS — R07.89 CHEST PRESSURE: ICD-10-CM

## 2024-08-19 DIAGNOSIS — R51.9 NONINTRACTABLE HEADACHE, UNSPECIFIED CHRONICITY PATTERN, UNSPECIFIED HEADACHE TYPE: Primary | ICD-10-CM

## 2024-08-19 LAB
ALBUMIN SERPL-MCNC: 4.9 G/DL (ref 3.2–4.8)
ALBUMIN/GLOB SERPL: 1.4 {RATIO} (ref 1–2)
ALP LIVER SERPL-CCNC: 103 U/L
ALT SERPL-CCNC: 73 U/L
ANION GAP SERPL CALC-SCNC: 6 MMOL/L (ref 0–18)
AST SERPL-CCNC: 49 U/L (ref ?–34)
BASOPHILS # BLD AUTO: 0.05 X10(3) UL (ref 0–0.2)
BASOPHILS NFR BLD AUTO: 0.5 %
BILIRUB SERPL-MCNC: 0.4 MG/DL (ref 0.3–1.2)
BILIRUB UR QL: NEGATIVE
BUN BLD-MCNC: 12 MG/DL (ref 9–23)
BUN/CREAT SERPL: 14.6 (ref 10–20)
CALCIUM BLD-MCNC: 10.4 MG/DL (ref 8.7–10.4)
CHLORIDE SERPL-SCNC: 109 MMOL/L (ref 98–112)
CLARITY UR: CLEAR
CO2 SERPL-SCNC: 28 MMOL/L (ref 21–32)
COLOR UR: COLORLESS
CREAT BLD-MCNC: 0.82 MG/DL
D DIMER PPP FEU-MCNC: 0.46 UG/ML FEU (ref ?–0.59)
DEPRECATED RDW RBC AUTO: 44 FL (ref 35.1–46.3)
EGFRCR SERPLBLD CKD-EPI 2021: 82 ML/MIN/1.73M2 (ref 60–?)
EOSINOPHIL # BLD AUTO: 0.24 X10(3) UL (ref 0–0.7)
EOSINOPHIL NFR BLD AUTO: 2.5 %
ERYTHROCYTE [DISTWIDTH] IN BLOOD BY AUTOMATED COUNT: 14.4 % (ref 11–15)
GLOBULIN PLAS-MCNC: 3.5 G/DL (ref 2–3.5)
GLUCOSE BLD-MCNC: 97 MG/DL (ref 70–99)
GLUCOSE UR-MCNC: NORMAL MG/DL
HCT VFR BLD AUTO: 41.7 %
HGB BLD-MCNC: 13.6 G/DL
HGB UR QL STRIP.AUTO: NEGATIVE
IMM GRANULOCYTES # BLD AUTO: 0.03 X10(3) UL (ref 0–1)
IMM GRANULOCYTES NFR BLD: 0.3 %
KETONES UR-MCNC: NEGATIVE MG/DL
LEUKOCYTE ESTERASE UR QL STRIP.AUTO: NEGATIVE
LYMPHOCYTES # BLD AUTO: 3.09 X10(3) UL (ref 1–4)
LYMPHOCYTES NFR BLD AUTO: 32.5 %
MCH RBC QN AUTO: 27.7 PG (ref 26–34)
MCHC RBC AUTO-ENTMCNC: 32.6 G/DL (ref 31–37)
MCV RBC AUTO: 84.9 FL
MONOCYTES # BLD AUTO: 0.83 X10(3) UL (ref 0.1–1)
MONOCYTES NFR BLD AUTO: 8.7 %
NEUTROPHILS # BLD AUTO: 5.27 X10 (3) UL (ref 1.5–7.7)
NEUTROPHILS # BLD AUTO: 5.27 X10(3) UL (ref 1.5–7.7)
NEUTROPHILS NFR BLD AUTO: 55.5 %
NITRITE UR QL STRIP.AUTO: NEGATIVE
OSMOLALITY SERPL CALC.SUM OF ELEC: 296 MOSM/KG (ref 275–295)
PH UR: 6 [PH] (ref 5–8)
PLATELET # BLD AUTO: 226 10(3)UL (ref 150–450)
POTASSIUM SERPL-SCNC: 4.9 MMOL/L (ref 3.5–5.1)
PROT SERPL-MCNC: 8.4 G/DL (ref 5.7–8.2)
PROT UR-MCNC: NEGATIVE MG/DL
RBC # BLD AUTO: 4.91 X10(6)UL
SARS-COV-2 RNA RESP QL NAA+PROBE: NOT DETECTED
SODIUM SERPL-SCNC: 143 MMOL/L (ref 136–145)
SP GR UR STRIP: <1.005 (ref 1–1.03)
TROPONIN I SERPL HS-MCNC: 4 NG/L
UROBILINOGEN UR STRIP-ACNC: NORMAL
WBC # BLD AUTO: 9.5 X10(3) UL (ref 4–11)

## 2024-08-19 PROCEDURE — 93010 ELECTROCARDIOGRAM REPORT: CPT

## 2024-08-19 PROCEDURE — 71045 X-RAY EXAM CHEST 1 VIEW: CPT | Performed by: EMERGENCY MEDICINE

## 2024-08-19 PROCEDURE — 84484 ASSAY OF TROPONIN QUANT: CPT | Performed by: EMERGENCY MEDICINE

## 2024-08-19 PROCEDURE — 99285 EMERGENCY DEPT VISIT HI MDM: CPT

## 2024-08-19 PROCEDURE — 96374 THER/PROPH/DIAG INJ IV PUSH: CPT

## 2024-08-19 PROCEDURE — 85025 COMPLETE CBC W/AUTO DIFF WBC: CPT | Performed by: EMERGENCY MEDICINE

## 2024-08-19 PROCEDURE — 93005 ELECTROCARDIOGRAM TRACING: CPT

## 2024-08-19 PROCEDURE — 85379 FIBRIN DEGRADATION QUANT: CPT | Performed by: EMERGENCY MEDICINE

## 2024-08-19 PROCEDURE — 81003 URINALYSIS AUTO W/O SCOPE: CPT | Performed by: EMERGENCY MEDICINE

## 2024-08-19 PROCEDURE — 96375 TX/PRO/DX INJ NEW DRUG ADDON: CPT

## 2024-08-19 PROCEDURE — 80053 COMPREHEN METABOLIC PANEL: CPT | Performed by: EMERGENCY MEDICINE

## 2024-08-19 RX ORDER — DIPHENHYDRAMINE HYDROCHLORIDE 50 MG/ML
25 INJECTION INTRAMUSCULAR; INTRAVENOUS ONCE
Status: COMPLETED | OUTPATIENT
Start: 2024-08-19 | End: 2024-08-19

## 2024-08-19 RX ORDER — METOCLOPRAMIDE HYDROCHLORIDE 5 MG/ML
10 INJECTION INTRAMUSCULAR; INTRAVENOUS ONCE
Status: COMPLETED | OUTPATIENT
Start: 2024-08-19 | End: 2024-08-19

## 2024-08-19 RX ORDER — KETOROLAC TROMETHAMINE 15 MG/ML
15 INJECTION, SOLUTION INTRAMUSCULAR; INTRAVENOUS ONCE
Status: COMPLETED | OUTPATIENT
Start: 2024-08-19 | End: 2024-08-19

## 2024-08-19 NOTE — ED INITIAL ASSESSMENT (HPI)
Patient complains of headache, complains of feeling like \"something is squeezing\" on her left side

## 2024-08-20 ENCOUNTER — OFFICE VISIT (OUTPATIENT)
Dept: INTERNAL MEDICINE CLINIC | Facility: CLINIC | Age: 59
End: 2024-08-20

## 2024-08-20 VITALS
BODY MASS INDEX: 50.02 KG/M2 | HEART RATE: 82 BPM | WEIGHT: 293 LBS | DIASTOLIC BLOOD PRESSURE: 85 MMHG | HEIGHT: 64 IN | SYSTOLIC BLOOD PRESSURE: 133 MMHG

## 2024-08-20 DIAGNOSIS — R74.8 ELEVATED LIVER ENZYMES: Primary | ICD-10-CM

## 2024-08-20 DIAGNOSIS — I10 PRIMARY HYPERTENSION: ICD-10-CM

## 2024-08-20 LAB
ATRIAL RATE: 77 BPM
P AXIS: 47 DEGREES
P-R INTERVAL: 180 MS
Q-T INTERVAL: 352 MS
QRS DURATION: 96 MS
QTC CALCULATION (BEZET): 398 MS
R AXIS: -28 DEGREES
T AXIS: 46 DEGREES
VENTRICULAR RATE: 77 BPM

## 2024-08-20 PROCEDURE — 99214 OFFICE O/P EST MOD 30 MIN: CPT | Performed by: NURSE PRACTITIONER

## 2024-08-20 RX ORDER — VALSARTAN 160 MG/1
160 TABLET ORAL DAILY
Qty: 90 TABLET | Refills: 3 | Status: SHIPPED | OUTPATIENT
Start: 2024-08-20

## 2024-08-20 NOTE — ED QUICK NOTES
Patient safe to be discharged home per provider. Discharge education given via printed AVS, reviewed with daughter. Reviewed: follow up care, medications and when to seek emergency care. Patient verbalizes understanding and is able to teach back. Patient ambulated to exit.

## 2024-08-20 NOTE — PROGRESS NOTES
Judi White is a 59 year old female.  HPI:   Pt is f/u after ER visit one day ago. She was experiencing CP and high BP. 180/11. BP in ER was 165/111. She also had some blood in her spit. Pt reports she ran out of her BP meds several days ago. Was taking Valsartan 80 mg daily. Not taking metoprolol, it was stopped one year ago.     Currently feeling well, denies any CP, SOB, HA, dizziness, vision changes, palpitations, appetite or weight changes, fatigue.     Covid testing negative.   Normal D Dimer and Troponin.   LFT's mildly elevated.   08/20/2024 EKG: NSR, \"Moderate voltage criteria for LVH, may be normal variant ( R in aVL , Ricardo product )   Cannot rule out Anterior infarct , age undetermined   Abnormal ECG \"    XR CHEST AP PORTABLE  (CPT=71045)    Result Date: 8/19/2024  CONCLUSION:   No focal opacity, pleural effusion, or pneumothorax.    Dictated by (CST): Aurelio Murphy MD on 8/19/2024 at 10:17 PM     Finalized by (CST): Aurelio Murphy MD on 8/19/2024 at 10:17 PM          Current Outpatient Medications   Medication Sig Dispense Refill    valsartan 160 MG Oral Tab Take 1 tablet (160 mg total) by mouth daily. 90 tablet 3    ketoconazole 2 % External Cream Apply to affected area twice daily 30 g 0    metFORMIN  MG Oral Tablet 24 Hr Take 1 tablet (750 mg total) by mouth daily. 90 tablet 2    Tirzepatide (MOUNJARO) 2.5 MG/0.5ML Subcutaneous Solution Pen-injector Inject 2.5 mg into the skin once a week. 2 mL 0    PEG 3350-KCl-Na Bicarb-NaCl (TRILYTE) 420 g Oral Recon Soln Take prep as directed by gastro office. May substitute with Trilyte/generic equivalent if needed. 4000 mL 0    famotidine (PEPCID) 40 MG Oral Tab Take 1 tablet (40 mg total) by mouth at bedtime. 30 tablet 1    atorvastatin 10 MG Oral Tab Take 1 tablet (10 mg total) by mouth nightly. 90 tablet 3    ergocalciferol 1.25 MG (88217 UT) Oral Cap Take 1 capsule (50,000 Units total) by mouth once a week. 12 capsule 1    Fluocinonide 0.05  % External Solution Apply twice daily  Monday-Friday. Take weekends off. Use on scalp 60 mL 12    hydrocortisone 2.5 % External Cream Apply rectally twice daily and after each bowel movement 28 g 0    ondansetron (ZOFRAN) 4 mg tablet Take 1 tablet (4 mg total) by mouth every 12 (twelve) hours as needed for Nausea. 20 tablet 0    triamcinolone 0.1 % External Cream Apply topically 2 (two) times daily as needed. 60 g 3    Potassium Chloride ER 10 MEQ Oral Tab CR Take 1 tab po daily  together  with  furosemide 90 tablet 0    furosemide 20 MG Oral Tab Take  1 tab po daily 90 tablet 0    ergocalciferol 1.25 MG (68783 UT) Oral Cap Take 1 capsule (50,000 Units total) by mouth once a week. With food for 12 weeks total then begin OTC Vitamin D at 2000 units daily with food thereafter 12 capsule 0    mometasone 0.1 % External Ointment Apply 1 Application topically daily. Apply to entrance of ear canal before sleep for `10-14 days 1 each 0    nortriptyline 25 MG Oral Cap Start with 1 pill QHS, for 1 week, then 2 pills qhs foor another week, then 3 pills qhs 90 capsule 3    metoprolol succinate ER 25 MG Oral Tablet 24 Hr Take 1 tablet (25 mg total) by mouth daily. 90 tablet 1      Past Medical History:    Amenorrhea    Anxiety    Diabetes (HCC)    High blood pressure    Hypertension    Mixed hyperlipidemia    Obesity    Scoliosis    Shortness of breath    Sleep apnea    Varicose vein      Social History:  Social History     Socioeconomic History    Marital status:    Tobacco Use    Smoking status: Never    Smokeless tobacco: Never   Vaping Use    Vaping status: Never Used   Substance and Sexual Activity    Alcohol use: Not Currently    Drug use: Never    Sexual activity: Not Currently   Other Topics Concern    Caffeine Concern No    Exercise No    Seat Belt No    Special Diet No    Stress Concern No    Weight Concern Yes    Grew up on a farm Yes    History of tanning Yes    Outdoor occupation Yes    Breast feeding No     Reaction to local anesthetic No    Pt has a pacemaker No    Pt has a defibrillator No     Social Determinants of Health     Food Insecurity: Low Risk  (4/20/2024)    Received from [facility]    Food Insecurity     Have there been times that your food ran out, and you didn't have money to get more?: No     Are there times that you worry that this might happen?: No   Transportation Needs: Low Risk  (4/20/2024)    Received from [facility]    Transportation Needs     Do you have trouble getting transportation to medical appointments?: No        REVIEW OF SYSTEMS:   Review of Systems   All other systems reviewed and are negative.         EXAM:   /85 (BP Location: Right arm, Patient Position: Sitting, Cuff Size: large)   Pulse 82   Ht 5' 4\" (1.626 m)   Wt (!) 312 lb (141.5 kg)   BMI 53.55 kg/m²     Physical Exam  Vitals reviewed.   Constitutional:       General: She is not in acute distress.     Appearance: She is obese.   HENT:      Head: Normocephalic.   Eyes:      General: No scleral icterus.     Conjunctiva/sclera: Conjunctivae normal.   Cardiovascular:      Rate and Rhythm: Normal rate and regular rhythm.      Pulses: Normal pulses.      Heart sounds: Normal heart sounds.   Pulmonary:      Effort: Pulmonary effort is normal. No respiratory distress.      Breath sounds: Normal breath sounds.   Skin:     General: Skin is warm.      Coloration: Skin is not jaundiced.   Neurological:      Mental Status: She is alert and oriented to person, place, and time.   Psychiatric:         Mood and Affect: Mood normal.         Judgment: Judgment normal.            ASSESSMENT AND PLAN:   1. Elevated liver enzymes  -repeat in 3 mo  - Comp Metabolic Panel (14) [E]; Future    2. Primary hypertension  -Increase valsartan from 80 to 160 mg once daily.  -Reviewed dose and s/e.  -Check BP twice daily and keep log  -DASH, hydrate, reviewed alarm signs/when to go to ER.   -F/u in 2 weeks  for BP check  - valsartan 160 MG Oral Tab; Take 1 tablet (160 mg total) by mouth daily.  Dispense: 90 tablet; Refill: 3       The patient indicates understanding of these issues and agrees to the plan.  The patient is asked to return in  2 weeks. .     The above note was creating using Dragon speech recognition technology. Please excuse any typos.

## 2024-08-20 NOTE — ED QUICK NOTES
Assumed care of patient.  Xray tech at bedside.    Bed is locked and in lowest position. Call light within reach.

## 2024-08-20 NOTE — ED PROVIDER NOTES
Patient Seen in: [facility] Emergency Department      History     Chief Complaint   Patient presents with    Headache     Stated Complaint: hypertension    Subjective:   HPI    59-year-old female with history of hypertension, diabetes, hyperlipidemia, sleep apnea, and severe obesity presents with multiple complaints.  The patient reports cough productive of blood-tinged sputum this morning upon awakening.  She then developed a frontal headache late this morning which has been persistent throughout the day.  She also complains of some tightness to her chest beginning late this afternoon around 5 PM.  She has not taken any medication for the headache.  She denies any vision or speech changes.  She denies focal weakness or numbness.  No known fevers.  Her daughter picked her up from work today and was told of her complaints.  She checked her blood pressure and found it to be elevated prompting her to bring her to the emergency department.  History is obtained from the patient's daughter as the patient does not speak English.  The patient declined  services.    Objective:   Past Medical History:    Amenorrhea    Anxiety    Diabetes (HCC)    High blood pressure    Hypertension    Mixed hyperlipidemia    Obesity    Scoliosis    Shortness of breath    Sleep apnea    Varicose vein              Past Surgical History:   Procedure Laterality Date      1989                Social History     Socioeconomic History    Marital status:    Tobacco Use    Smoking status: Never    Smokeless tobacco: Never   Vaping Use    Vaping status: Never Used   Substance and Sexual Activity    Alcohol use: Not Currently    Drug use: Never    Sexual activity: Not Currently   Other Topics Concern    Caffeine Concern No    Exercise No    Seat Belt No    Special Diet No    Stress Concern No    Weight Concern Yes    Grew up on a farm Yes    History of tanning Yes    Outdoor occupation Yes    Breast feeding No     Reaction to local anesthetic No    Pt has a pacemaker No    Pt has a defibrillator No     Social Determinants of Health     Food Insecurity: Low Risk  (4/20/2024)    Received from [facility]    Food Insecurity     Have there been times that your food ran out, and you didn't have money to get more?: No     Are there times that you worry that this might happen?: No   Transportation Needs: Low Risk  (4/20/2024)    Received from [facility]    Transportation Needs     Do you have trouble getting transportation to medical appointments?: No              Review of Systems    Positive for stated Chief Complaint: Headache    Other systems are as noted in HPI.  Constitutional and vital signs reviewed.      All other systems reviewed and negative except as noted above.    Physical Exam     ED Triage Vitals   BP 08/19/24 1828 (!) 165/111   Pulse 08/19/24 1828 84   Resp 08/19/24 1828 18   Temp 08/19/24 1828 98 °F (36.7 °C)   Temp src --    SpO2 08/19/24 1828 98 %   O2 Device 08/19/24 1945 None (Room air)       Current Vitals:   Vital Signs  BP: 148/75  Pulse: 75  Resp: 17  Temp: 98 °F (36.7 °C)  MAP (mmHg): 97    Oxygen Therapy  SpO2: 96 %  O2 Device: None (Room air)            Physical Exam      General Appearance: alert, no distress  Eyes: pupils equal and round no pallor or injection.  EOMI.  ENT, Mouth: mucous membranes moist.  Bilateral TMs and auditory canals normal-appearing.  Oropharynx normal-appearing.  Respiratory: there are no retractions, lungs are clear to auscultation  Cardiovascular: regular rate and rhythm  Gastrointestinal:  abdomen is soft and non tender, no masses, bowel sounds normal  Neurological: Speech normal.  Cranial nerves II through XII intact.  No focal motor or sensory deficits to extremities x 4.  Cerebellum intact to finger-to-nose.  Skin: warm and dry, no rashes.  Musculoskeletal: neck is supple non tender        Extremities are symmetrical, full range  of motion  Psychiatric: patient is oriented X 3, there is no agitation    DIFFERENTIAL DIAGNOSIS: After history and physical exam differential diagnosis was considered for headache including subarachnoid hemorrhage migraine headache, meningitis, infectious causes such as pharyngitis, tension headache and sinusitis, chest pain including chest wall pain, myocardial ischemia, pulmonary embolus and pulmonary infectious process.        ED Course     Labs Reviewed   COMP METABOLIC PANEL (14) - Abnormal; Notable for the following components:       Result Value    Calculated Osmolality 296 (*)     ALT 73 (*)     AST 49 (*)     Total Protein 8.4 (*)     Albumin 4.9 (*)     All other components within normal limits   URINALYSIS WITH CULTURE REFLEX - Abnormal; Notable for the following components:    Urine Color Colorless (*)     Spec Gravity <1.005 (*)     All other components within normal limits   TROPONIN I HIGH SENSITIVITY - Normal   D-DIMER - Normal   RAPID SARS-COV-2 BY PCR - Normal   CBC WITH DIFFERENTIAL WITH PLATELET   RAINBOW DRAW LAVENDER   RAINBOW DRAW LIGHT GREEN   RAINBOW DRAW BLUE     EKG    Rate, intervals and axes as noted on EKG Report.  Rate: 77  Rhythm: Sinus Rhythm  Axis: Normal  Reading: Nonspecific EKG                        MDM      Chest x-ray was reviewed independently by me.  No pneumonia or pneumothorax noted.  Lab and EKG results noted.  Patient reports resolution of headache post medications.  She is resting comfortably at present and is without complaints.  Will discharge the patient home with recommendations to follow-up with her primary physician for reevaluation.  She is advised to return if severe headache or increased chest pain develops.                                   Medical Decision Making      Disposition and Plan     Clinical Impression:  1. Nonintractable headache, unspecified chronicity pattern, unspecified headache type    2. Chest pressure          Disposition:  Discharge  8/19/2024 10:22 pm    Follow-up:  Estelle Multani MD  [address] [place] [address] [phone]    Follow up            Medications Prescribed:  Current Discharge Medication List

## 2024-08-20 NOTE — DISCHARGE INSTRUCTIONS
Take Tylenol as needed if headache recurs.  Follow-up with your primary physician for reevaluation.  Return to the emergency department if increased chest pain, severe headache, or other new symptoms develop.

## 2024-08-22 ENCOUNTER — HOSPITAL ENCOUNTER (OUTPATIENT)
Dept: ULTRASOUND IMAGING | Facility: HOSPITAL | Age: 59
Discharge: HOME | End: 2024-08-22
Payer: MEDICAID

## 2024-08-22 DIAGNOSIS — I83.813 VARICOSE VEINS OF BILATERAL LOWER EXTREMITIES WITH PAIN: ICD-10-CM

## 2024-08-22 PROCEDURE — 93970 EXTREMITY STUDY: CPT

## 2024-08-23 ENCOUNTER — LAB ENCOUNTER (OUTPATIENT)
Dept: LAB | Age: 59
End: 2024-08-23
Attending: NURSE PRACTITIONER
Payer: MEDICAID

## 2024-08-23 DIAGNOSIS — R74.8 ELEVATED LIVER ENZYMES: ICD-10-CM

## 2024-08-23 LAB
ALBUMIN SERPL-MCNC: 4.6 G/DL (ref 3.2–4.8)
ALBUMIN/GLOB SERPL: 1.4 {RATIO} (ref 1–2)
ALP LIVER SERPL-CCNC: 97 U/L
ALT SERPL-CCNC: 75 U/L
ANION GAP SERPL CALC-SCNC: 5 MMOL/L (ref 0–18)
AST SERPL-CCNC: 53 U/L (ref ?–34)
BILIRUB SERPL-MCNC: 0.6 MG/DL (ref 0.3–1.2)
BUN BLD-MCNC: 13 MG/DL (ref 9–23)
BUN/CREAT SERPL: 16.3 (ref 10–20)
CALCIUM BLD-MCNC: 9.6 MG/DL (ref 8.7–10.4)
CHLORIDE SERPL-SCNC: 107 MMOL/L (ref 98–112)
CO2 SERPL-SCNC: 30 MMOL/L (ref 21–32)
CREAT BLD-MCNC: 0.8 MG/DL
EGFRCR SERPLBLD CKD-EPI 2021: 85 ML/MIN/1.73M2 (ref 60–?)
FASTING STATUS PATIENT QL REPORTED: NO
GLOBULIN PLAS-MCNC: 3.3 G/DL (ref 2–3.5)
GLUCOSE BLD-MCNC: 103 MG/DL (ref 70–99)
OSMOLALITY SERPL CALC.SUM OF ELEC: 294 MOSM/KG (ref 275–295)
POTASSIUM SERPL-SCNC: 4.7 MMOL/L (ref 3.5–5.1)
PROT SERPL-MCNC: 7.9 G/DL (ref 5.7–8.2)
SODIUM SERPL-SCNC: 142 MMOL/L (ref 136–145)

## 2024-08-23 PROCEDURE — 80053 COMPREHEN METABOLIC PANEL: CPT

## 2024-08-23 PROCEDURE — 36415 COLL VENOUS BLD VENIPUNCTURE: CPT

## 2024-08-23 NOTE — TELEPHONE ENCOUNTER
Denied, patient must have tried and failed to 3 preferred drugs for condition for 2 weeks  She has tried 2 need to try one more which is recommended   victoza

## 2024-08-27 ENCOUNTER — MED REC SCAN ONLY (OUTPATIENT)
Dept: PHYSICAL MEDICINE AND REHAB | Facility: CLINIC | Age: 59
End: 2024-08-27

## 2024-08-27 ENCOUNTER — OFFICE VISIT (OUTPATIENT)
Facility: CLINIC | Age: 59
End: 2024-08-27
Payer: MEDICAID

## 2024-08-27 VITALS
BODY MASS INDEX: 50.02 KG/M2 | HEART RATE: 75 BPM | HEIGHT: 64 IN | SYSTOLIC BLOOD PRESSURE: 128 MMHG | DIASTOLIC BLOOD PRESSURE: 80 MMHG | WEIGHT: 293 LBS

## 2024-08-27 DIAGNOSIS — Z71.2 ENCOUNTER TO DISCUSS TEST RESULTS: Primary | ICD-10-CM

## 2024-08-27 PROCEDURE — 99212 OFFICE O/P EST SF 10 MIN: CPT

## 2024-08-27 NOTE — PROGRESS NOTES
Judi White is a 59 year old female  No LMP recorded. (Menstrual status: Menopause).   Chief Complaint   Patient presents with    Consult     Discuss pelvic US 8/3/24    .  [place]  utilized for this visit. She is concerned about the fibroid and how it will affect her overall health.     D/w pt that the size of her fibroid 1.8 cm posterior intramural uterine fibroid is not a significant finding. She believes it is contributing to her bladder pressure. Explained to her the size of the fibroid is not likely to contribute to her bladder pressure, her higher BMI can be the reason. She also believes the fibroid is contributing to her back pressure, explained again to patient the size of the fibroid is not so large to cause this problem.     OBSTETRICS HISTORY:  OB History    Para Term  AB Living   3 3           SAB IAB Ectopic Multiple Live Births                  # Outcome Date GA Lbr José/2nd Weight Sex Type Anes PTL Lv   3 Para            2 Para            1 Para                GYNE HISTORY:  Periods none due to menopause    History   Sexual Activity    Sexual activity: Yes                 MEDICAL HISTORY:  Past Medical History:    Amenorrhea    Anxiety    Diabetes (HCC)    High blood pressure    Hypertension    Mixed hyperlipidemia    Obesity    Scoliosis    Shortness of breath    Sleep apnea    Varicose vein       SURGICAL HISTORY:  Past Surgical History:   Procedure Laterality Date      1989       SOCIAL HISTORY:  Social History     Socioeconomic History    Marital status:      Spouse name: Not on file    Number of children: Not on file    Years of education: Not on file    Highest education level: Not on file   Occupational History    Not on file   Tobacco Use    Smoking status: Never    Smokeless tobacco: Never   Vaping Use    Vaping status: Never Used   Substance and Sexual Activity    Alcohol use: Not Currently    Drug use: Never    Sexual activity: Yes    Other Topics Concern    Caffeine Concern No    Exercise No    Seat Belt No    Special Diet No    Stress Concern No    Weight Concern Yes    Grew up on a farm Yes    History of tanning Yes    Outdoor occupation Yes    Breast feeding No    Reaction to local anesthetic No    Pt has a pacemaker No    Pt has a defibrillator No   Social History Narrative    Not on file     Social Determinants of Health     Financial Resource Strain: Not on file   Food Insecurity: Low Risk  (4/20/2024)    Received from [facility]    Food Insecurity     Have there been times that your food ran out, and you didn't have money to get more?: No     Are there times that you worry that this might happen?: No   Transportation Needs: Low Risk  (4/20/2024)    Received from [facility]    Transportation Needs     Do you have trouble getting transportation to medical appointments?: No     How do you normally get to and from your appointments?: Not on file   Physical Activity: Not on file   Stress: Not on file   Social Connections: Not on file   Housing Stability: Not on file (4/20/2024)       FAMILY HISTORY:  No family history on file.    MEDICATIONS:    Current Outpatient Medications:     valsartan 160 MG Oral Tab, Take 1 tablet (160 mg total) by mouth daily., Disp: 90 tablet, Rfl: 3    ketoconazole 2 % External Cream, Apply to affected area twice daily, Disp: 30 g, Rfl: 0    metFORMIN  MG Oral Tablet 24 Hr, Take 1 tablet (750 mg total) by mouth daily., Disp: 90 tablet, Rfl: 2    Tirzepatide (MOUNJARO) 2.5 MG/0.5ML Subcutaneous Solution Pen-injector, Inject 2.5 mg into the skin once a week., Disp: 2 mL, Rfl: 0    PEG 3350-KCl-Na Bicarb-NaCl (TRILYTE) 420 g Oral Recon Soln, Take prep as directed by gastro office. May substitute with Trilyte/generic equivalent if needed., Disp: 4000 mL, Rfl: 0    famotidine (PEPCID) 40 MG Oral Tab, Take 1 tablet (40 mg total) by mouth at bedtime., Disp: 30 tablet, Rfl:  1    atorvastatin 10 MG Oral Tab, Take 1 tablet (10 mg total) by mouth nightly., Disp: 90 tablet, Rfl: 3    ergocalciferol 1.25 MG (61907 UT) Oral Cap, Take 1 capsule (50,000 Units total) by mouth once a week., Disp: 12 capsule, Rfl: 1    Fluocinonide 0.05 % External Solution, Apply twice daily  Monday-Friday. Take weekends off. Use on scalp, Disp: 60 mL, Rfl: 12    hydrocortisone 2.5 % External Cream, Apply rectally twice daily and after each bowel movement, Disp: 28 g, Rfl: 0    ondansetron (ZOFRAN) 4 mg tablet, Take 1 tablet (4 mg total) by mouth every 12 (twelve) hours as needed for Nausea., Disp: 20 tablet, Rfl: 0    triamcinolone 0.1 % External Cream, Apply topically 2 (two) times daily as needed., Disp: 60 g, Rfl: 3    Potassium Chloride ER 10 MEQ Oral Tab CR, Take 1 tab po daily  together  with  furosemide, Disp: 90 tablet, Rfl: 0    furosemide 20 MG Oral Tab, Take  1 tab po daily, Disp: 90 tablet, Rfl: 0    ergocalciferol 1.25 MG (44652 UT) Oral Cap, Take 1 capsule (50,000 Units total) by mouth once a week. With food for 12 weeks total then begin OTC Vitamin D at 2000 units daily with food thereafter, Disp: 12 capsule, Rfl: 0    mometasone 0.1 % External Ointment, Apply 1 Application topically daily. Apply to entrance of ear canal before sleep for `10-14 days, Disp: 1 each, Rfl: 0    nortriptyline 25 MG Oral Cap, Start with 1 pill QHS, for 1 week, then 2 pills qhs foor another week, then 3 pills qhs, Disp: 90 capsule, Rfl: 3    metoprolol succinate ER 25 MG Oral Tablet 24 Hr, Take 1 tablet (25 mg total) by mouth daily., Disp: 90 tablet, Rfl: 1    ALLERGIES:    Allergies   Allergen Reactions    Trulicity [Dulaglutide] NAUSEA ONLY    Lactose ITCHING         :   Assessment & Plan:    1. Encounter to discuss test results      All questions and answered to the best of my ability and reassured pt her pelvic US findings were normal.

## 2024-08-28 ENCOUNTER — MED REC SCAN ONLY (OUTPATIENT)
Dept: INTERNAL MEDICINE CLINIC | Facility: CLINIC | Age: 59
End: 2024-08-28

## 2024-08-28 RX ORDER — PEN NEEDLE, DIABETIC 30 GX3/16"
1 NEEDLE, DISPOSABLE MISCELLANEOUS DAILY
Qty: 90 EACH | Refills: 0 | Status: SHIPPED | OUTPATIENT
Start: 2024-08-28

## 2024-08-28 RX ORDER — LIRAGLUTIDE 6 MG/ML
INJECTION SUBCUTANEOUS
Qty: 9 ML | Refills: 1 | Status: SHIPPED | OUTPATIENT
Start: 2024-08-28 | End: 2024-10-11

## 2024-08-28 NOTE — TELEPHONE ENCOUNTER
We can begin victoza, it is a daily injectable medication as opposed to weekly    victoza injectable:  Week 1- 0.6mg  Week 2- 1.2mg  Week 3- 1.8mg (stay at this dose)

## 2024-08-29 ENCOUNTER — OFFICE VISIT (OUTPATIENT)
Dept: INTERNAL MEDICINE CLINIC | Facility: CLINIC | Age: 59
End: 2024-08-29
Payer: MEDICAID

## 2024-08-29 ENCOUNTER — OFFICE VISIT (OUTPATIENT)
Dept: PHYSICAL MEDICINE AND REHAB | Facility: CLINIC | Age: 59
End: 2024-08-29
Payer: MEDICAID

## 2024-08-29 VITALS — HEIGHT: 64 IN | BODY MASS INDEX: 50.02 KG/M2 | WEIGHT: 293 LBS

## 2024-08-29 DIAGNOSIS — M54.16 BILATERAL LUMBAR RADICULOPATHY: Primary | ICD-10-CM

## 2024-08-29 DIAGNOSIS — E66.01 MORBID OBESITY WITH BMI OF 50.0-59.9, ADULT (HCC): ICD-10-CM

## 2024-08-29 DIAGNOSIS — E66.01 CLASS 3 SEVERE OBESITY DUE TO EXCESS CALORIES WITH BODY MASS INDEX (BMI) OF 50.0 TO 59.9 IN ADULT, UNSPECIFIED WHETHER SERIOUS COMORBIDITY PRESENT (HCC): Primary | ICD-10-CM

## 2024-08-29 DIAGNOSIS — E11.9 CONTROLLED TYPE 2 DIABETES MELLITUS WITHOUT COMPLICATION, WITHOUT LONG-TERM CURRENT USE OF INSULIN (HCC): ICD-10-CM

## 2024-08-29 DIAGNOSIS — I10 PRIMARY HYPERTENSION: ICD-10-CM

## 2024-08-29 DIAGNOSIS — M47.816 LUMBAR FACET ARTHROPATHY: ICD-10-CM

## 2024-08-29 DIAGNOSIS — E78.2 MIXED HYPERLIPIDEMIA: ICD-10-CM

## 2024-08-29 PROCEDURE — 99214 OFFICE O/P EST MOD 30 MIN: CPT | Performed by: PHYSICAL MEDICINE & REHABILITATION

## 2024-08-29 PROCEDURE — 97803 MED NUTRITION INDIV SUBSEQ: CPT

## 2024-08-29 NOTE — PATIENT INSTRUCTIONS
????:     1. ?????????????? ????? ??????? ??? ??? ??????? ???. ?????? ????? ??? ? MyNet Diary, ???????? ?????? ????????.   2.  ???????? ????????? ????????? 4-6 ???????? ???/??????? ?? ????.  ????????? ????? ? ?????????? ??? ??? ???.  ???????? ?? 114-140 ?????? (??? 16-20 ?????) ????? ?? ????.  ??????????? ???????????? ????????? ?? ????? 100-120 ?????? ?? ???? ??? ?????.     3.  ??????????? ??????? ?????????, ??????????? ??? 20-30 ?????, ???? ??? ???????????.  ?????????? ??????? ??? - 30 ??????.   4. ??????????? ???????? ?????????? 64 ????? ???? ?? ????.  (????????? ?????? ?????, ?’??? ??? ?????? ? ????, ???? ? ????????? 2 O, ??????? True Lemon, Crystal Light, ?????????????? ????? ???????).   5.  ?????? ??????? ?????? ? ????? 30 ?????? ?? ???? ??? ????? (?????????, ??????).     6. ?????? ?????????? ?? ????? 10 ?????? 3 ??? ?? ???????.  (Sit and Be Fit ??? Stand and Be Fit ?? YouTube)          Goals:    1.  Use the plate method when eating a meal. Keep a food record in MyNet Diary, select the macros dashboard.  2.  Strive to consume at least 4-6 meals/snacks per day.  Include protein and produce when you eat.  Aim for 114-140 grams (or 16-20 oz) of protein per day.  Try to keep the carbohydrates at 100-120 grams per day or less.    3.  Practice eating strategies, chew food 20-30 times before swallowing.  Make the meals last 30 minutes.  4.  Continue to drink at least 64 oz per day of water.  (Try adding lemon, mint or fruit to water, Protein 2 O water, add True Lemon, Crystal Light, use a measured container).  5.  Exercise with a goal of 30 minutes per day for exercise (for example,walking).    6.  Strength training at least 10 minutes 3 days per week.  (Sit and Be Fit  or Stand and Be Fit on YouTube)

## 2024-08-29 NOTE — PROGRESS NOTES
FOLLOW UP NUTRITION CONSULTATION        Nutrition Assessment    Number of consultations with dietitian: 1    Height:  Ht Readings from Last 1 Encounters:   08/29/24 5' 4\" (1.626 m)       Weight:   Wt Readings from Last 2 Encounters:   08/29/24 (!) 313 lb (142 kg)   08/27/24 (!) 313 lb 12.8 oz (142.3 kg)       BMI:  BMI Readings from Last 1 Encounters:   08/29/24 53.73 kg/m²       Weight change: Decrease of 2 lbs in the past month        Current Diet/Changes in Eating Habits: Per pt has been thinking a lot about foods, I am used to growing my own food.  I am not used to eating 3 times per day.  I gained 15 kg in since December 2023.  Used to live in [facility], ate fresh fruits/veg but not much protein     Food/Beverage Intake:     Wakes at 7 am-8 am  Breakfast: 8-8:30 warm water with lemon   9 am- eggs 1 hard boiled eggs buckwheat or milet   Lunch: 2 pm -6-9 oz red fish with lemon tomato cucumber and spinach with oil big bowl   Dinner: 4:30  3-eggs and salad or skips or chinese food: fish, ribs,cutlets   Snacks: tea, fruit  Beverages: water 2 L/day) and tea herbal- 2-3 cups (16-24 oz)      Diet/Lifestyle Modifications Following Previous Nutrition Consultation      Food journal: no    Physical activity: walks in the park, physical therapy for herniated disk    Spent 75 minutes in consultation with the patient.      Nutrition Assessment and Intervention/Education:    Nutrition follow up for weight loss was provided. Used #  [id number] Sushila. Per pt is taking metformin and feels that it is helping to control our blood sugar.  \"I don't want to eat since taking it\". I like eating perogi but has been more mindful of protein and has increased protein in diet.  Per diet recall is getting in about 105 grams of protein.  Discussed benefits of keeping carbs low at 7-8 servings per day or less while striving to increase protein in order to feel satisfied throughout the day and manage diabetes.  Pt verbalized  understanding. Per pt is drinking adequate amounts of fluids per day.  Per pt is not doing any exercise, feels limited due to pain in back.  Discussed benefits of adding exercise within means in small increments.  Pt verbalized understanding. Patient agreed to goals below.    Goals:    1.  Use the plate method when eating a meal. Keep a food record in MyNet Diary, select the macros dashboard.2.  Strive to consume at least 4-6 meals/snacks per day.  Include protein and produce when you eat.  Aim for 114-140 grams (or 16-20 oz) of protein per day.  Try to keep the carbohydrates at 100-120 grams per day or less.    3.  Practice eating strategies, chew food 20-30 times before swallowing.  Make the meals last 30 minutes.  4.  Continue to drink at least 64 oz per day of water.  (Try adding lemon, mint or fruit to water, Protein 2 O water, add True Lemon, Crystal Light, use a measured container).  5.  Exercise with a goal of 30 minutes per day for exercise (for example,walking).    6.  Strength training at least 10 minutes 3 days per week.  (Sit and Be Fit  or Stand and Be Fit on YouTube)     Monitoring/Evaluation: {Follow up appt scheduled with dietitian        Tresa Nunez RD, LDN

## 2024-08-29 NOTE — PROGRESS NOTES
[facility]  FOLLOW UP EVALUATION      HISTORY OF PRESENT ILLNESS:     Chief Complaint   Patient presents with    Low Back Pain     LOV: 77/3/2024 pt comes in with midline low back aching pain. Denies T/N. Denies weakness. Rates pain 3/10, worsens when walking. Currently in PT. Takes Meloxicam PRN. She went to ER on 8/21/2024 at [facility].       The patient is a 59 year old female with significant past medical history of anxiety, hypertension, sleep apnea, varicose veins, amenorrhea, morbid obesity who presents for evaluation of bilateral low back pain.  She states the pain was severe enough and she felt a locking sensation in her back which made her present to the ER at outside institution about a week ago.  She had CT of the chest and EKG which was negative.  She continues to have low back pain that is worsened intermittently with standing or walking.  She denies significant radiating pain into the legs but does notice some pain radiation to the hips.  Pain is 3 out of 10 at the moment.  She is taking meloxicam as needed.  She denies any changes in strength or bowel bladder.    PHYSICAL EXAM:   Ht 64\"   Wt (!) 313 lb (142 kg)   BMI 53.73 kg/m²     Gait  Able to toe walk and heel walk without any difficulty    LUMBAR SPINE:  Inspection: no erythema, swelling, or obvious deformity.  Their iliac crest and shoulder heights are symmetrical.     Palpation: Non tender to palpation of the spinous process.   ROM: FAROM but pain with end range of flexion and extension.  Strength: 5/5 in bilateral lower extremities  Sensation: Intact to light touch in all dermatomes of the lower extremities  Reflexes: 2/4 at L4 and S1  Facet Loading: Positive bilateral lower lumbar facet joints  Straight leg raise: negative for radicular pain symptoms  Slump test: negative for pain symptoms for radicular pain symptoms      IMAGING:     Xray Lumbar spine completed 6/4/2024 is notable for  mild degenerative disc disease as well as spondylosis in the lumbar spine    All imaging results were reviewed and discussed with patient.      ASSESSMENT/PLAN:     1. Bilateral lumbar radiculopathy    2. Lumbar facet arthropathy    3. Morbid obesity with BMI of 50.0-59.9, adult (HCC)        Judi White is a 58-year-old female presenting today for evaluation of low back pain in the axial lumbar spine but with radiation bilateral legs consistent with possible lower lumbar disc herniation with lumbar radiculopathy.  I have recommended holding on physical therapy as she has not noted any significant improvement over the last 6 weeks.  Recommend she obtain MRI imaging of the lumbar spine and follow-up after.  She will likely benefit from interventional lumbosacral spine procedure.  Recommend follow-up after imaging is completed and continuing meloxicam as needed.      The patient verbalized understanding with the plan and was in agreement. All questions/concerns were addressed and there were no barriers to learning.  Please note Dragon dictation software was used to dictate this note and may result in inadvertent typos.    Chantel De La Rosa DO, FAAPMR & CAQSM  Physical Medicine and Rehabilitation  Sports and Spine Medicine    PAST MEDICAL HISTORY:     Past Medical History:    Amenorrhea    Anxiety    Diabetes (HCC)    High blood pressure    Hypertension    Mixed hyperlipidemia    Obesity    Scoliosis    Shortness of breath    Sleep apnea    Varicose vein         PAST SURGICAL HISTORY:     Past Surgical History:   Procedure Laterality Date      1989         CURRENT MEDICATIONS:     Current Outpatient Medications   Medication Sig Dispense Refill    Liraglutide (VICTOZA) 18 MG/3ML Subcutaneous Solution Pen-injector Inject 0.6 mg into the skin daily for 7 days, THEN 1.2 mg daily for 7 days, THEN 1.8 mg daily. 9 mL 1    Insulin Pen Needle (PEN NEEDLES) 32G X 4 MM Does not apply Misc 1 each daily. 90 each 0     valsartan 160 MG Oral Tab Take 1 tablet (160 mg total) by mouth daily. 90 tablet 3    ketoconazole 2 % External Cream Apply to affected area twice daily 30 g 0    metFORMIN  MG Oral Tablet 24 Hr Take 1 tablet (750 mg total) by mouth daily. 90 tablet 2    Tirzepatide (MOUNJARO) 2.5 MG/0.5ML Subcutaneous Solution Pen-injector Inject 2.5 mg into the skin once a week. 2 mL 0    PEG 3350-KCl-Na Bicarb-NaCl (TRILYTE) 420 g Oral Recon Soln Take prep as directed by gastro office. May substitute with Trilyte/generic equivalent if needed. 4000 mL 0    famotidine (PEPCID) 40 MG Oral Tab Take 1 tablet (40 mg total) by mouth at bedtime. 30 tablet 1    atorvastatin 10 MG Oral Tab Take 1 tablet (10 mg total) by mouth nightly. 90 tablet 3    ergocalciferol 1.25 MG (81736 UT) Oral Cap Take 1 capsule (50,000 Units total) by mouth once a week. 12 capsule 1    Fluocinonide 0.05 % External Solution Apply twice daily  Monday-Friday. Take weekends off. Use on scalp 60 mL 12    hydrocortisone 2.5 % External Cream Apply rectally twice daily and after each bowel movement 28 g 0    ondansetron (ZOFRAN) 4 mg tablet Take 1 tablet (4 mg total) by mouth every 12 (twelve) hours as needed for Nausea. 20 tablet 0    triamcinolone 0.1 % External Cream Apply topically 2 (two) times daily as needed. 60 g 3    Potassium Chloride ER 10 MEQ Oral Tab CR Take 1 tab po daily  together  with  furosemide 90 tablet 0    furosemide 20 MG Oral Tab Take  1 tab po daily 90 tablet 0    ergocalciferol 1.25 MG (46589 UT) Oral Cap Take 1 capsule (50,000 Units total) by mouth once a week. With food for 12 weeks total then begin OTC Vitamin D at 2000 units daily with food thereafter 12 capsule 0    mometasone 0.1 % External Ointment Apply 1 Application topically daily. Apply to entrance of ear canal before sleep for `10-14 days 1 each 0    nortriptyline 25 MG Oral Cap Start with 1 pill QHS, for 1 week, then 2 pills qhs foor another week, then 3 pills qhs 90 capsule  3    metoprolol succinate ER 25 MG Oral Tablet 24 Hr Take 1 tablet (25 mg total) by mouth daily. 90 tablet 1         ALLERGIES:     Allergies   Allergen Reactions    Trulicity [Dulaglutide] NAUSEA ONLY    Lactose ITCHING         FAMILY HISTORY:   History reviewed. No pertinent family history.       SOCIAL HISTORY:     Social History     Socioeconomic History    Marital status:    Tobacco Use    Smoking status: Never    Smokeless tobacco: Never   Vaping Use    Vaping status: Never Used   Substance and Sexual Activity    Alcohol use: Not Currently    Drug use: Never    Sexual activity: Yes   Other Topics Concern    Caffeine Concern No    Exercise No    Seat Belt No    Special Diet No    Stress Concern No    Weight Concern Yes    Grew up on a farm Yes    History of tanning Yes    Outdoor occupation Yes    Breast feeding No    Reaction to local anesthetic No    Pt has a pacemaker No    Pt has a defibrillator No     Social Determinants of Health     Food Insecurity: Low Risk  (4/20/2024)    Received from [facility]    Food Insecurity     Have there been times that your food ran out, and you didn't have money to get more?: No     Are there times that you worry that this might happen?: No   Transportation Needs: Low Risk  (4/20/2024)    Received from [facility]    Transportation Needs     Do you have trouble getting transportation to medical appointments?: No          REVIEW OF SYSTEMS:   Patient-reported ROS  Constitutional  Sleep Disturbance: admits  Chills: denies  Fever: denies  Weight Gain: denies  Weight Loss: denies   Cardiovascular  Chest Pain: denies  Irregular Heartbeat: denies   Respiratory  Painful Breathing: denies  Wheezing: denies   Gastrointestinal  Bowel Incontinence: denies  Heartburn: denies  Abdominal Pain: denies  Blood in Stool : denies  Rectal Pain: denies   Hematology  Easy Bruising: denies  Easy Bleeding: denies   Genitourinary  Difficulty  Urinating: denies  Bladder Incontinence: denies  Pelvic Pain: denies  Painful Urination: denies   Musculoskeletal  Joint Stiffness: denies  Painful Joints: denies  Tailbone Pain: denies  Swollen Joints: denies   Peripheral Vascular  Swelling of Legs/Feet: denies  Cold Extremities: denies   Skin  Open Sores: denies  Nodules or Lumps: denies  Rash: denies   Neurological  Loss of Strength Since last Visit: denies  Tingling/Numbness: denies  Balance: denies   Psychiatric  Anxiety: denies  Depressed Mood: denies       PHYSICAL EXAM:   General: No immediate distress  Head: Normocephalic/ Atraumatic  Eyes: Extra-occular movements intact.   Ears: No auricular hematoma or deformities  Mouth: No lesions or ulcerations  Heart: peripheral pulses intact. Normal capillary refill.   Lungs: Non-labored respirations  Abdomen: No abdominal guarding  Extremities: No lower extremity edema bilaterally   Skin: No lesions noted   Cognition: alert & oriented x 3, attentive, able to follow 2 step commands, comprehention intact, spontaneous speech intact  Psychiatric: Mood and affect appropriate      LABS:     Lab Results   Component Value Date     (H) 07/17/2024    A1C 6.5 (H) 07/17/2024     Lab Results   Component Value Date    WBC 9.5 08/19/2024    RBC 4.91 08/19/2024    HGB 13.6 08/19/2024    HCT 41.7 08/19/2024    MCV 84.9 08/19/2024    MCH 27.7 08/19/2024    MCHC 32.6 08/19/2024    RDW 14.4 08/19/2024    .0 08/19/2024     Lab Results   Component Value Date     (H) 08/23/2024    BUN 13 08/23/2024    BUNCREA 16.3 08/23/2024    CREATSERUM 0.80 08/23/2024    ANIONGAP 5 08/23/2024    CA 9.6 08/23/2024    OSMOCALC 294 08/23/2024    ALKPHO 97 08/23/2024    AST 53 (H) 08/23/2024    ALT 75 (H) 08/23/2024    BILT 0.6 08/23/2024    TP 7.9 08/23/2024    ALB 4.6 08/23/2024    GLOBULIN 3.3 08/23/2024     08/23/2024    K 4.7 08/23/2024     08/23/2024    CO2 30.0 08/23/2024     No results found for: \"PTP\", \"PT\",  \"INR\"  Lab Results   Component Value Date    VITD 38.1 07/17/2024

## 2024-09-03 ENCOUNTER — MED REC SCAN ONLY (OUTPATIENT)
Dept: PHYSICAL MEDICINE AND REHAB | Facility: CLINIC | Age: 59
End: 2024-09-03

## 2024-10-03 ENCOUNTER — HOSPITAL ENCOUNTER (OUTPATIENT)
Dept: MRI IMAGING | Age: 59
Discharge: HOME | End: 2024-10-03
Attending: PHYSICAL MEDICINE & REHABILITATION
Payer: MEDICAID

## 2024-10-03 DIAGNOSIS — M54.16 BILATERAL LUMBAR RADICULOPATHY: ICD-10-CM

## 2024-10-03 PROCEDURE — 72148 MRI LUMBAR SPINE W/O DYE: CPT | Performed by: PHYSICAL MEDICINE & REHABILITATION

## 2024-10-08 ENCOUNTER — TELEPHONE (OUTPATIENT)
Dept: INTERNAL MEDICINE CLINIC | Facility: CLINIC | Age: 59
End: 2024-10-08

## 2024-10-08 ENCOUNTER — MED REC SCAN ONLY (OUTPATIENT)
Dept: PHYSICAL MEDICINE AND REHAB | Facility: CLINIC | Age: 59
End: 2024-10-08

## 2024-10-08 ENCOUNTER — TELEPHONE (OUTPATIENT)
Dept: PHYSICAL MEDICINE AND REHAB | Facility: CLINIC | Age: 59
End: 2024-10-08

## 2024-10-08 DIAGNOSIS — E66.813 CLASS 3 SEVERE OBESITY WITH SERIOUS COMORBIDITY AND BODY MASS INDEX (BMI) OF 50.0 TO 59.9 IN ADULT, UNSPECIFIED OBESITY TYPE (HCC): ICD-10-CM

## 2024-10-08 DIAGNOSIS — Z51.81 ENCOUNTER FOR THERAPEUTIC DRUG LEVEL MONITORING: ICD-10-CM

## 2024-10-08 DIAGNOSIS — E66.01 CLASS 3 SEVERE OBESITY WITH SERIOUS COMORBIDITY AND BODY MASS INDEX (BMI) OF 50.0 TO 59.9 IN ADULT, UNSPECIFIED OBESITY TYPE (HCC): ICD-10-CM

## 2024-10-08 DIAGNOSIS — E11.9 TYPE 2 DIABETES MELLITUS WITHOUT COMPLICATION, WITHOUT LONG-TERM CURRENT USE OF INSULIN (HCC): ICD-10-CM

## 2024-10-08 NOTE — TELEPHONE ENCOUNTER
Melvina(on HIPAA) indicated that patient has been on victoza 1.8mg daily for almost 3 weeks now. Patient has only lost 4 pounds. States she weighs 139kg currently. States that medication works great on her blood sugars. States blood sugars are in the 90's, but does not feel working very well on her weight. Wanted to know what the next step would be? States has medication left till tomorrow. Also patient was doing physical therapy for her lower back pain which it was helping since they were doing both exercises and massage therapy. Stated that patient's insurance will not pay for more physical therapy. Has been 2 weeks now that she stopped with physical therapy and still having low back pain. Patient not sure if can get more physical therapy approved and if needs to see a chiropractor? Please advise.

## 2024-10-08 NOTE — TELEPHONE ENCOUNTER
Last seen 8/9/24 1/31/2025  2:20 PM Alannah Jackson, RALPH [id number] [address]       Requesting victoza  LOV: 8/9/2024  RTC: not noted  Last Relevant Labs: 7/17/24  Filled: 8/28/24 #9 ml with 1 refills

## 2024-10-08 NOTE — TELEPHONE ENCOUNTER
MRI was performed and patient's daughter (Melvina) is calling to get Dr. De La Rosa's recommendation .  This RN scheduled patient for her follow up on 10/17/2024 in [place].    Current Pain 10/10 (per daughter' s report when patient is moving) lying down is better.     Patient's daughter reports the patient felt better with PT - insurance won't pay for more. Patient's daughter states that ED doesn't do much for her, but if Dr. De La Rosa recommends she go to ED they will go.    Routed to Dr. De La Rosa to advise patient's daughter is requesting anything that can help the patient's pain in the meantime until the MRI follow up visit.     Daughter requests a call back to her number : [phone]    MRI on 10/03/2024   MRI Impression:   \"CONCLUSION:       1. Mild degenerative disc disease at L4-5 with mild to moderate facet joint degenerative change.  There is mild bilateral neural foraminal narrowing.      2. Moderate broad-based degenerative disc bulge eccentric to the left posterior lateral aspect at L5-S1 causing moderate left neural foraminal narrowing.  There is no central canal stenosis. \"

## 2024-10-08 NOTE — TELEPHONE ENCOUNTER
Patient needs to follow-up with physician assistant Alannah Jackson, who prescribed him Victoza injections he has to make appointment and call her office today see if she will refill medication.  She always needs to make follow-up appointments with the people she sees for different reasons, please let patient know, see below call

## 2024-10-08 NOTE — TELEPHONE ENCOUNTER
[facility] PMR s/w patient's daughter (Melvina) on separate encounter dated 10/08/2024 regarding MRI completion and f/u appt made at that time. Patient is scheduled for f/u with Dr. De La Rosa on 10/17/2024.  Message was sent to Dr. De La Rosa to check on any recommendations to get the patient through until her appt with him.

## 2024-10-09 RX ORDER — GABAPENTIN 300 MG/1
300 CAPSULE ORAL AS DIRECTED
Qty: 90 CAPSULE | Refills: 0 | Status: SHIPPED | OUTPATIENT
Start: 2024-10-09

## 2024-10-09 NOTE — TELEPHONE ENCOUNTER
S/W Dr. De La Rosa end of day on 10/08/2024.  Per Dr. De La Rosa, try Gabapentin 300 mg starting nightly and then slowly titrate up to twice daily, and then three times daily if needed.    S/W patient's daughter (Melvina) to advise Dr. De La Rosa recommending Gabapentin 300 mg - went over slower titration with Melvina-advised to start with evening dose only- advised of common s/e.     Patient's daughter asked about something else to take during the day, this RN advised this is the medication Dr. De La Rosa recommended and she will be able to take during the day, but must titrate slowly to minimize s/e .  Melvina verbalized understanding.    Patient's preferred pharmacy confirmed.    This RN entered order to go to patient's pharmacy.    Gabapentin 300 mg TID w/ slow titration instructions.

## 2024-10-16 ENCOUNTER — HOSPITAL ENCOUNTER (EMERGENCY)
Facility: HOSPITAL | Age: 59
Discharge: HOME | End: 2024-10-16
Attending: EMERGENCY MEDICINE
Payer: MEDICAID

## 2024-10-16 ENCOUNTER — APPOINTMENT (OUTPATIENT)
Dept: GENERAL RADIOLOGY | Facility: HOSPITAL | Age: 59
End: 2024-10-16
Attending: EMERGENCY MEDICINE
Payer: MEDICAID

## 2024-10-16 VITALS
RESPIRATION RATE: 20 BRPM | OXYGEN SATURATION: 96 % | SYSTOLIC BLOOD PRESSURE: 149 MMHG | HEART RATE: 77 BPM | DIASTOLIC BLOOD PRESSURE: 98 MMHG | TEMPERATURE: 98 F

## 2024-10-16 DIAGNOSIS — J06.9 VIRAL UPPER RESPIRATORY TRACT INFECTION WITH COUGH: Primary | ICD-10-CM

## 2024-10-16 LAB
S PYO AG THROAT QL: NEGATIVE
SARS-COV-2 RNA RESP QL NAA+PROBE: NOT DETECTED

## 2024-10-16 PROCEDURE — 99284 EMERGENCY DEPT VISIT MOD MDM: CPT

## 2024-10-16 PROCEDURE — 71045 X-RAY EXAM CHEST 1 VIEW: CPT | Performed by: EMERGENCY MEDICINE

## 2024-10-16 PROCEDURE — 87880 STREP A ASSAY W/OPTIC: CPT

## 2024-10-16 RX ORDER — IBUPROFEN 600 MG/1
600 TABLET, FILM COATED ORAL ONCE
Status: COMPLETED | OUTPATIENT
Start: 2024-10-16 | End: 2024-10-16

## 2024-10-16 RX ORDER — FLUTICASONE PROPIONATE 50 MCG
1 SPRAY, SUSPENSION (ML) NASAL 2 TIMES DAILY
Qty: 16 G | Refills: 0 | Status: SHIPPED | OUTPATIENT
Start: 2024-10-16 | End: 2024-10-31

## 2024-10-16 RX ORDER — PREDNISONE 20 MG/1
60 TABLET ORAL ONCE
Status: COMPLETED | OUTPATIENT
Start: 2024-10-16 | End: 2024-10-16

## 2024-10-17 ENCOUNTER — OFFICE VISIT (OUTPATIENT)
Dept: PHYSICAL MEDICINE AND REHAB | Facility: CLINIC | Age: 59
End: 2024-10-17
Payer: MEDICAID

## 2024-10-17 ENCOUNTER — TELEPHONE (OUTPATIENT)
Dept: PHYSICAL MEDICINE AND REHAB | Facility: CLINIC | Age: 59
End: 2024-10-17

## 2024-10-17 DIAGNOSIS — M54.16 BILATERAL LUMBAR RADICULOPATHY: Primary | ICD-10-CM

## 2024-10-17 DIAGNOSIS — E66.01 MORBID OBESITY WITH BMI OF 50.0-59.9, ADULT (HCC): ICD-10-CM

## 2024-10-17 DIAGNOSIS — M47.816 LUMBAR FACET ARTHROPATHY: ICD-10-CM

## 2024-10-17 DIAGNOSIS — M51.26 HNP (HERNIATED NUCLEUS PULPOSUS), LUMBAR: ICD-10-CM

## 2024-10-17 DIAGNOSIS — M47.816 LUMBAR FACET ARTHROPATHY: Primary | ICD-10-CM

## 2024-10-17 PROCEDURE — 99214 OFFICE O/P EST MOD 30 MIN: CPT | Performed by: PHYSICAL MEDICINE & REHABILITATION

## 2024-10-17 NOTE — DISCHARGE INSTRUCTIONS
Mucinex for cough and congestion  Use Flonase 1 spray each nostril twice daily for 2 weeks  Return if difficulty breathing  Drink plenty of fluids

## 2024-10-17 NOTE — ED PROVIDER NOTES
Patient Seen in: [facility] Emergency Department      History     Chief Complaint   Patient presents with    Sore Throat    Body ache and/or chills    Viral Syndrome     Stated Complaint: sore throat    Subjective:   HPI      The patient is a 59-year-old female with history of hypertension diabetes who presents with 3 days of bodyaches sore throat chills fatigue and mild cough.  No known sick contacts or recent travel.  No chest pain or pleuritic pain.  No shortness of breath.  She feels like she cannot breathe out of her nose.    Objective:     Past Medical History:    Amenorrhea    Anxiety    Diabetes (HCC)    High blood pressure    Hypertension    Mixed hyperlipidemia    Obesity    Scoliosis    Shortness of breath    Sleep apnea    Varicose vein              Past Surgical History:   Procedure Laterality Date      1989                Social History     Socioeconomic History    Marital status:    Tobacco Use    Smoking status: Never    Smokeless tobacco: Never   Vaping Use    Vaping status: Never Used   Substance and Sexual Activity    Alcohol use: Not Currently    Drug use: Never    Sexual activity: Yes   Other Topics Concern    Caffeine Concern No    Exercise No    Seat Belt No    Special Diet No    Stress Concern No    Weight Concern Yes    Grew up on a farm Yes    History of tanning Yes    Outdoor occupation Yes    Breast feeding No    Reaction to local anesthetic No    Pt has a pacemaker No    Pt has a defibrillator No     Social Drivers of Health     Food Insecurity: Low Risk  (2024)    Received from [facility]    Food Insecurity     Have there been times that your food ran out, and you didn't have money to get more?: No     Are there times that you worry that this might happen?: No   Transportation Needs: Low Risk  (2024)    Received from [facility]    Transportation Needs     Do you have trouble getting transportation to  medical appointments?: No                  Physical Exam     ED Triage Vitals [10/16/24 2558]   BP (!) 149/98   Pulse 77   Resp 20   Temp 97.9 °F (36.6 °C)   Temp src    SpO2 96 %   O2 Device        Current Vitals:   Vital Signs  BP: (!) 149/98  Pulse: 77  Resp: 20  Temp: 97.9 °F (36.6 °C)    Oxygen Therapy  SpO2: 96 %        Physical Exam  Vitals and nursing note reviewed.   Constitutional:       General: She is not in acute distress.     Appearance: Normal appearance. She is well-developed. She is obese. She is not ill-appearing.   HENT:      Head: Normocephalic and atraumatic.      Right Ear: Tympanic membrane normal.      Left Ear: Tympanic membrane normal.      Nose: Congestion present.      Mouth/Throat:      Mouth: Mucous membranes are moist.      Pharynx: Pharyngeal swelling present. No oropharyngeal exudate or posterior oropharyngeal erythema.   Eyes:      Conjunctiva/sclera: Conjunctivae normal.      Pupils: Pupils are equal, round, and reactive to light.   Neck:      Vascular: No JVD.   Cardiovascular:      Rate and Rhythm: Normal rate and regular rhythm.      Heart sounds: Normal heart sounds. No murmur heard.  Pulmonary:      Effort: Pulmonary effort is normal.      Breath sounds: Normal breath sounds.   Abdominal:      General: Bowel sounds are normal. There is no distension.      Palpations: Abdomen is soft. There is no mass.      Tenderness: There is no abdominal tenderness. There is no guarding or rebound.   Musculoskeletal:         General: Normal range of motion.      Cervical back: Normal range of motion and neck supple.      Right lower leg: No edema.      Left lower leg: No edema.   Lymphadenopathy:      Cervical: No cervical adenopathy.   Skin:     General: Skin is warm and dry.      Capillary Refill: Capillary refill takes less than 2 seconds.      Findings: No rash.   Neurological:      General: No focal deficit present.      Mental Status: She is alert and oriented to person, place, and  time.      Deep Tendon Reflexes: Reflexes are normal and symmetric.   Psychiatric:         Judgment: Judgment normal.     Differential diagnosis includes strep pharyngitis, viral pharyngitis, other URI, COVID        ED Course     Labs Reviewed   POCT RAPID STREP - Normal   RAPID SARS-COV-2 BY PCR - Normal                   MDM      Pulse ox 96% on room air, normal        Medical Decision Making  Viral URI COVID and strep negative  Lungs clear normal oxygenation and negative chest x-ray  Conservative management with symptomatic treatment  Vital signs stable prior to discharge    Problems Addressed:  Viral upper respiratory tract infection with cough: acute illness or injury    Amount and/or Complexity of Data Reviewed  External Data Reviewed: labs.     Details: Baseline labs from 8/23/2024 reviewed and normal  Labs: ordered.  Radiology: ordered and independent interpretation performed.     Details: No infiltrate other results per radiologist    Risk  OTC drugs.  Prescription drug management.  Risk Details: Dosage and side effect discussed with patient        Disposition and Plan     Clinical Impression:  1. Viral upper respiratory tract infection with cough         Disposition:  Discharge  10/16/2024  8:38 pm    Follow-up:  Estelle Multani MD  [address] [place] [id number] [phone]    Schedule an appointment as soon as possible for a visit            Medications Prescribed:  Current Discharge Medication List        START taking these medications    Details   fluticasone propionate 50 MCG/ACT Nasal Suspension 1 spray by Nasal route 2 (two) times daily for 15 days.  Qty: 16 g, Refills: 0                 Supplementary Documentation:

## 2024-10-17 NOTE — TELEPHONE ENCOUNTER
Initiated authorization for Bilateral L4-5, L5-S1 Facet joint injection under fluoroscopy guidance - Only 2 levels can be approved in one session. [facility] [facility]'s 2 dx:M47.816 to be done at [facility] with [employer]    Status: Approved  Reference/Authorization #  [id number]  Valid: 10/17/24-12/16/24

## 2024-10-21 RX ORDER — LIRAGLUTIDE 6 MG/ML
INJECTION SUBCUTANEOUS
Qty: 9 ML | Refills: 1 | Status: SHIPPED | OUTPATIENT
Start: 2024-10-21

## 2024-10-21 NOTE — PROGRESS NOTES
[facility]  FOLLOW UP EVALUATION      HISTORY OF PRESENT ILLNESS:     Chief Complaint   Patient presents with    Follow - Up     LOV 8/29/24. F/U to review lumbar spine MRI on 10/3/24. Pain 10/10. Pain radiates into mid back and knees. Denies N/T. Denies weakness. Patient taking Gabapentin with no improvement. Patient finished PT with some improvement that has lapsed.       The patient is a 59 year old female with significant past medical history of anxiety, hypertension, sleep apnea, varicose veins, amenorrhea, morbid obesity who presents for follow-up evaluation of bilateral low back pain.  Patient is here for follow-up evaluation after MRI imaging.  She continues to have 10 out of 10 pain.  Pain radiates into the low back into the mid back as well as the knees.  She has primarily a difficult time standing up from a seated position but also with standing and ambulation increase activity.  She denies any significant weakness in the legs.  The pain in the legs is not as severe as the lower back.  She is taking gabapentin with no significant change.  She does find over-the-counter NSAID to be helpful.  She has finished physical therapy and is doing home exercises.  She found it to be somewhat helpful in improving her symptoms but the pain is still affecting her ability to function and stay active.    PHYSICAL EXAM:   There were no vitals taken for this visit.    Gait  Able to toe walk and heel walk without any difficulty    LUMBAR SPINE:  Inspection: no erythema, swelling, or obvious deformity.  Their iliac crest and shoulder heights are symmetrical.     Palpation: Non tender to palpation of the spinous process.   ROM: FAROM but pain with end range of flexion and extension.  Strength: 5/5 in bilateral lower extremities  Sensation: Intact to light touch in all dermatomes of the lower extremities  Reflexes: 2/4 at L4 and S1  Facet Loading: Positive bilateral lower lumbar  facet joints  Straight leg raise: negative for radicular pain symptoms  Slump test: negative for pain symptoms for radicular pain symptoms      IMAGING:     MRI lumbar spine completed on 10/3/2024 was personally reviewed which is notable for mild degenerative changes at L4-5 there is a broad-based degenerative disc bulge with moderate bilateral facet arthropathy and mild bilateral neuroforaminal narrowing.  There is moderate broad-based degenerative disc bulge at L5-S1 eccentric to the left posterior lateral aspect of the disc space.  There is moderate left neuroforaminal narrowing.  The facet joint degenerative changes as well.    Xray Lumbar spine completed 6/4/2024 is notable for mild degenerative disc disease as well as spondylosis in the lumbar spine    All imaging results were reviewed and discussed with patient.      ASSESSMENT/PLAN:     1. Bilateral lumbar radiculopathy    2. Lumbar facet arthropathy    3. Morbid obesity with BMI of 50.0-59.9, adult (HCC)    4. HNP (herniated nucleus pulposus), lumbar        Judi White is a 58-year-old female presenting today for evaluation of neck pain with radiation bilateral legs with lumbar to colopathy but also primarily pain secondary to the facet degenerative changes and facet arthropathy.  I recommended bilateral lower lumbar facet joint injection under fluoroscopy guidance.  Recommend she continue working on weight loss management which was reiterated today as well as topical treatment and Tylenol in addition to gabapentin to be continued.  If she does not have sufficient improvement with this we will consider lumbar epidural steroid injection next.      The patient verbalized understanding with the plan and was in agreement. All questions/concerns were addressed and there were no barriers to learning.  Please note Dragon dictation software was used to dictate this note and may result in inadvertent typos.    Chantel De La Rosa DO, FAAPMR & CAQSM  Physical Medicine  and Rehabilitation  Sports and Spine Medicine    PAST MEDICAL HISTORY:     Past Medical History:    Amenorrhea    Anxiety    Diabetes (HCC)    High blood pressure    Hypertension    Mixed hyperlipidemia    Obesity    Scoliosis    Shortness of breath    Sleep apnea    Varicose vein         PAST SURGICAL HISTORY:     Past Surgical History:   Procedure Laterality Date      1989         CURRENT MEDICATIONS:     Current Outpatient Medications   Medication Sig Dispense Refill    Liraglutide (VICTOZA) 18 MG/3ML Subcutaneous Solution Pen-injector Inject 1.8 mg subcutaneously daily 9 mL 1    fluticasone propionate 50 MCG/ACT Nasal Suspension 1 spray by Nasal route 2 (two) times daily for 15 days. 16 g 0    gabapentin 300 MG Oral Cap Take 1 capsule (300 mg total) by mouth As Directed. Take 1 capsule (300 mg total) nightly for 3 days, if tolerated, add morning dose of 1 capsule (300 mg total) for 3 days and if tolerated add afternoon dose of 1 capsule (300 mg ) .  NOTE: increase doses slowly to minimize side effects. 90 capsule 0    Insulin Pen Needle (PEN NEEDLES) 32G X 4 MM Does not apply Misc 1 each daily. 90 each 0    valsartan 160 MG Oral Tab Take 1 tablet (160 mg total) by mouth daily. 90 tablet 3    ketoconazole 2 % External Cream Apply to affected area twice daily 30 g 0    metFORMIN  MG Oral Tablet 24 Hr Take 1 tablet (750 mg total) by mouth daily. 90 tablet 2    Tirzepatide (MOUNJARO) 2.5 MG/0.5ML Subcutaneous Solution Pen-injector Inject 2.5 mg into the skin once a week. 2 mL 0    PEG 3350-KCl-Na Bicarb-NaCl (TRILYTE) 420 g Oral Recon Soln Take prep as directed by gastro office. May substitute with Trilyte/generic equivalent if needed. 4000 mL 0    famotidine (PEPCID) 40 MG Oral Tab Take 1 tablet (40 mg total) by mouth at bedtime. 30 tablet 1    atorvastatin 10 MG Oral Tab Take 1 tablet (10 mg total) by mouth nightly. 90 tablet 3    ergocalciferol 1.25 MG (83961 UT) Oral Cap Take 1 capsule (50,000  Units total) by mouth once a week. 12 capsule 1    Fluocinonide 0.05 % External Solution Apply twice daily  Monday-Friday. Take weekends off. Use on scalp 60 mL 12    hydrocortisone 2.5 % External Cream Apply rectally twice daily and after each bowel movement 28 g 0    ondansetron (ZOFRAN) 4 mg tablet Take 1 tablet (4 mg total) by mouth every 12 (twelve) hours as needed for Nausea. 20 tablet 0    triamcinolone 0.1 % External Cream Apply topically 2 (two) times daily as needed. 60 g 3    Potassium Chloride ER 10 MEQ Oral Tab CR Take 1 tab po daily  together  with  furosemide 90 tablet 0    furosemide 20 MG Oral Tab Take  1 tab po daily 90 tablet 0    ergocalciferol 1.25 MG (06945 UT) Oral Cap Take 1 capsule (50,000 Units total) by mouth once a week. With food for 12 weeks total then begin OTC Vitamin D at 2000 units daily with food thereafter 12 capsule 0    mometasone 0.1 % External Ointment Apply 1 Application topically daily. Apply to entrance of ear canal before sleep for `10-14 days 1 each 0    nortriptyline 25 MG Oral Cap Start with 1 pill QHS, for 1 week, then 2 pills qhs foor another week, then 3 pills qhs 90 capsule 3    metoprolol succinate ER 25 MG Oral Tablet 24 Hr Take 1 tablet (25 mg total) by mouth daily. 90 tablet 1         ALLERGIES:     Allergies   Allergen Reactions    Trulicity [Dulaglutide] NAUSEA ONLY    Lactose ITCHING         FAMILY HISTORY:   No family history on file.       SOCIAL HISTORY:     Social History     Socioeconomic History    Marital status:    Tobacco Use    Smoking status: Never    Smokeless tobacco: Never   Vaping Use    Vaping status: Never Used   Substance and Sexual Activity    Alcohol use: Not Currently    Drug use: Never    Sexual activity: Yes   Other Topics Concern    Caffeine Concern No    Exercise No    Seat Belt No    Special Diet No    Stress Concern No    Weight Concern Yes    Grew up on a farm Yes    History of tanning Yes    Outdoor occupation Yes     Breast feeding No    Reaction to local anesthetic No    Pt has a pacemaker No    Pt has a defibrillator No     Social Drivers of Health     Food Insecurity: Low Risk  (4/20/2024)    Received from [facility]    Food Insecurity     Have there been times that your food ran out, and you didn't have money to get more?: No     Are there times that you worry that this might happen?: No   Transportation Needs: Low Risk  (4/20/2024)    Received from [facility]    Transportation Needs     Do you have trouble getting transportation to medical appointments?: No          REVIEW OF SYSTEMS:   Patient-reported ROS  Constitutional  Sleep Disturbance: admits  Chills: denies  Fever: denies  Weight Gain: denies  Weight Loss: denies   Cardiovascular  Chest Pain: denies  Irregular Heartbeat: denies   Respiratory  Painful Breathing: denies  Wheezing: denies   Gastrointestinal  Bowel Incontinence: denies  Heartburn: denies  Abdominal Pain: denies  Blood in Stool : denies  Rectal Pain: denies   Hematology  Easy Bruising: denies  Easy Bleeding: denies   Genitourinary  Difficulty Urinating: denies  Bladder Incontinence: denies  Pelvic Pain: denies  Painful Urination: denies   Musculoskeletal  Joint Stiffness: denies  Painful Joints: denies  Tailbone Pain: denies  Swollen Joints: denies   Peripheral Vascular  Swelling of Legs/Feet: denies  Cold Extremities: denies   Skin  Open Sores: denies  Nodules or Lumps: denies  Rash: denies   Neurological  Loss of Strength Since last Visit: denies  Tingling/Numbness: denies  Balance: denies   Psychiatric  Anxiety: denies  Depressed Mood: denies       PHYSICAL EXAM:   General: No immediate distress  Head: Normocephalic/ Atraumatic  Eyes: Extra-occular movements intact.   Ears: No auricular hematoma or deformities  Mouth: No lesions or ulcerations  Heart: peripheral pulses intact. Normal capillary refill.   Lungs: Non-labored respirations  Abdomen: No abdominal  guarding  Extremities: No lower extremity edema bilaterally   Skin: No lesions noted   Cognition: alert & oriented x 3, attentive, able to follow 2 step commands, comprehention intact, spontaneous speech intact  Psychiatric: Mood and affect appropriate      LABS:     Lab Results   Component Value Date     (H) 07/17/2024    A1C 6.5 (H) 07/17/2024     Lab Results   Component Value Date    WBC 9.5 08/19/2024    RBC 4.91 08/19/2024    HGB 13.6 08/19/2024    HCT 41.7 08/19/2024    MCV 84.9 08/19/2024    MCH 27.7 08/19/2024    MCHC 32.6 08/19/2024    RDW 14.4 08/19/2024    .0 08/19/2024     Lab Results   Component Value Date     (H) 08/23/2024    BUN 13 08/23/2024    BUNCREA 16.3 08/23/2024    CREATSERUM 0.80 08/23/2024    ANIONGAP 5 08/23/2024    CA 9.6 08/23/2024    OSMOCALC 294 08/23/2024    ALKPHO 97 08/23/2024    AST 53 (H) 08/23/2024    ALT 75 (H) 08/23/2024    BILT 0.6 08/23/2024    TP 7.9 08/23/2024    ALB 4.6 08/23/2024    GLOBULIN 3.3 08/23/2024     08/23/2024    K 4.7 08/23/2024     08/23/2024    CO2 30.0 08/23/2024     No results found for: \"PTP\", \"PT\", \"INR\"  Lab Results   Component Value Date    VITD 38.1 07/17/2024

## 2024-10-21 NOTE — TELEPHONE ENCOUNTER
ID#: [id number]    Patient has been scheduled for Bilateral L4-5, L5-S1 Facet joint injection on 10/28/24 at the OR/[facility] with Dr. De La Rosa.   Anesthesia type:  Local  Please note: The [facility] will call the business day prior to discuss the exact time/arrival and additional instructions for your appointment.  Patient was advised that if he/she does receive the covid vaccine it needs to be at least 2 weeks before or after the injection.  Medications and allergies reviewed.  [facility] located at [address] [place] [id number]   may park in the blue/green parking lot.  Patient verbalized understanding and agrees with plan.  Scheduled in Epic: Yes  Scheduled in Surgical Case: Yes  Follow up appointment made: NOV: 10/28/2024 Chantel De La Rosa DO

## 2024-10-21 NOTE — H&P (VIEW-ONLY)
[facility]  FOLLOW UP EVALUATION      HISTORY OF PRESENT ILLNESS:     Chief Complaint   Patient presents with    Follow - Up     LOV 8/29/24. F/U to review lumbar spine MRI on 10/3/24. Pain 10/10. Pain radiates into mid back and knees. Denies N/T. Denies weakness. Patient taking Gabapentin with no improvement. Patient finished PT with some improvement that has lapsed.       The patient is a 59 year old female with significant past medical history of anxiety, hypertension, sleep apnea, varicose veins, amenorrhea, morbid obesity who presents for follow-up evaluation of bilateral low back pain.  Patient is here for follow-up evaluation after MRI imaging.  She continues to have 10 out of 10 pain.  Pain radiates into the low back into the mid back as well as the knees.  She has primarily a difficult time standing up from a seated position but also with standing and ambulation increase activity.  She denies any significant weakness in the legs.  The pain in the legs is not as severe as the lower back.  She is taking gabapentin with no significant change.  She does find over-the-counter NSAID to be helpful.  She has finished physical therapy and is doing home exercises.  She found it to be somewhat helpful in improving her symptoms but the pain is still affecting her ability to function and stay active.    PHYSICAL EXAM:   There were no vitals taken for this visit.    Gait  Able to toe walk and heel walk without any difficulty    LUMBAR SPINE:  Inspection: no erythema, swelling, or obvious deformity.  Their iliac crest and shoulder heights are symmetrical.     Palpation: Non tender to palpation of the spinous process.   ROM: FAROM but pain with end range of flexion and extension.  Strength: 5/5 in bilateral lower extremities  Sensation: Intact to light touch in all dermatomes of the lower extremities  Reflexes: 2/4 at L4 and S1  Facet Loading: Positive bilateral lower lumbar  facet joints  Straight leg raise: negative for radicular pain symptoms  Slump test: negative for pain symptoms for radicular pain symptoms      IMAGING:     MRI lumbar spine completed on 10/3/2024 was personally reviewed which is notable for mild degenerative changes at L4-5 there is a broad-based degenerative disc bulge with moderate bilateral facet arthropathy and mild bilateral neuroforaminal narrowing.  There is moderate broad-based degenerative disc bulge at L5-S1 eccentric to the left posterior lateral aspect of the disc space.  There is moderate left neuroforaminal narrowing.  The facet joint degenerative changes as well.    Xray Lumbar spine completed 6/4/2024 is notable for mild degenerative disc disease as well as spondylosis in the lumbar spine    All imaging results were reviewed and discussed with patient.      ASSESSMENT/PLAN:     1. Bilateral lumbar radiculopathy    2. Lumbar facet arthropathy    3. Morbid obesity with BMI of 50.0-59.9, adult (HCC)    4. HNP (herniated nucleus pulposus), lumbar        Judi White is a 58-year-old female presenting today for evaluation of neck pain with radiation bilateral legs with lumbar to colopathy but also primarily pain secondary to the facet degenerative changes and facet arthropathy.  I recommended bilateral lower lumbar facet joint injection under fluoroscopy guidance.  Recommend she continue working on weight loss management which was reiterated today as well as topical treatment and Tylenol in addition to gabapentin to be continued.  If she does not have sufficient improvement with this we will consider lumbar epidural steroid injection next.      The patient verbalized understanding with the plan and was in agreement. All questions/concerns were addressed and there were no barriers to learning.  Please note Dragon dictation software was used to dictate this note and may result in inadvertent typos.    Chantel De La Rosa DO, FAAPMR & CAQSM  Physical Medicine  and Rehabilitation  Sports and Spine Medicine    PAST MEDICAL HISTORY:     Past Medical History:    Amenorrhea    Anxiety    Diabetes (HCC)    High blood pressure    Hypertension    Mixed hyperlipidemia    Obesity    Scoliosis    Shortness of breath    Sleep apnea    Varicose vein         PAST SURGICAL HISTORY:     Past Surgical History:   Procedure Laterality Date      1989         CURRENT MEDICATIONS:     Current Outpatient Medications   Medication Sig Dispense Refill    Liraglutide (VICTOZA) 18 MG/3ML Subcutaneous Solution Pen-injector Inject 1.8 mg subcutaneously daily 9 mL 1    fluticasone propionate 50 MCG/ACT Nasal Suspension 1 spray by Nasal route 2 (two) times daily for 15 days. 16 g 0    gabapentin 300 MG Oral Cap Take 1 capsule (300 mg total) by mouth As Directed. Take 1 capsule (300 mg total) nightly for 3 days, if tolerated, add morning dose of 1 capsule (300 mg total) for 3 days and if tolerated add afternoon dose of 1 capsule (300 mg ) .  NOTE: increase doses slowly to minimize side effects. 90 capsule 0    Insulin Pen Needle (PEN NEEDLES) 32G X 4 MM Does not apply Misc 1 each daily. 90 each 0    valsartan 160 MG Oral Tab Take 1 tablet (160 mg total) by mouth daily. 90 tablet 3    ketoconazole 2 % External Cream Apply to affected area twice daily 30 g 0    metFORMIN  MG Oral Tablet 24 Hr Take 1 tablet (750 mg total) by mouth daily. 90 tablet 2    Tirzepatide (MOUNJARO) 2.5 MG/0.5ML Subcutaneous Solution Pen-injector Inject 2.5 mg into the skin once a week. 2 mL 0    PEG 3350-KCl-Na Bicarb-NaCl (TRILYTE) 420 g Oral Recon Soln Take prep as directed by gastro office. May substitute with Trilyte/generic equivalent if needed. 4000 mL 0    famotidine (PEPCID) 40 MG Oral Tab Take 1 tablet (40 mg total) by mouth at bedtime. 30 tablet 1    atorvastatin 10 MG Oral Tab Take 1 tablet (10 mg total) by mouth nightly. 90 tablet 3    ergocalciferol 1.25 MG (62750 UT) Oral Cap Take 1 capsule (50,000  Units total) by mouth once a week. 12 capsule 1    Fluocinonide 0.05 % External Solution Apply twice daily  Monday-Friday. Take weekends off. Use on scalp 60 mL 12    hydrocortisone 2.5 % External Cream Apply rectally twice daily and after each bowel movement 28 g 0    ondansetron (ZOFRAN) 4 mg tablet Take 1 tablet (4 mg total) by mouth every 12 (twelve) hours as needed for Nausea. 20 tablet 0    triamcinolone 0.1 % External Cream Apply topically 2 (two) times daily as needed. 60 g 3    Potassium Chloride ER 10 MEQ Oral Tab CR Take 1 tab po daily  together  with  furosemide 90 tablet 0    furosemide 20 MG Oral Tab Take  1 tab po daily 90 tablet 0    ergocalciferol 1.25 MG (65808 UT) Oral Cap Take 1 capsule (50,000 Units total) by mouth once a week. With food for 12 weeks total then begin OTC Vitamin D at 2000 units daily with food thereafter 12 capsule 0    mometasone 0.1 % External Ointment Apply 1 Application topically daily. Apply to entrance of ear canal before sleep for `10-14 days 1 each 0    nortriptyline 25 MG Oral Cap Start with 1 pill QHS, for 1 week, then 2 pills qhs foor another week, then 3 pills qhs 90 capsule 3    metoprolol succinate ER 25 MG Oral Tablet 24 Hr Take 1 tablet (25 mg total) by mouth daily. 90 tablet 1         ALLERGIES:     Allergies   Allergen Reactions    Trulicity [Dulaglutide] NAUSEA ONLY    Lactose ITCHING         FAMILY HISTORY:   No family history on file.       SOCIAL HISTORY:     Social History     Socioeconomic History    Marital status:    Tobacco Use    Smoking status: Never    Smokeless tobacco: Never   Vaping Use    Vaping status: Never Used   Substance and Sexual Activity    Alcohol use: Not Currently    Drug use: Never    Sexual activity: Yes   Other Topics Concern    Caffeine Concern No    Exercise No    Seat Belt No    Special Diet No    Stress Concern No    Weight Concern Yes    Grew up on a farm Yes    History of tanning Yes    Outdoor occupation Yes     Breast feeding No    Reaction to local anesthetic No    Pt has a pacemaker No    Pt has a defibrillator No     Social Drivers of Health     Food Insecurity: Low Risk  (4/20/2024)    Received from [facility]    Food Insecurity     Have there been times that your food ran out, and you didn't have money to get more?: No     Are there times that you worry that this might happen?: No   Transportation Needs: Low Risk  (4/20/2024)    Received from [facility]    Transportation Needs     Do you have trouble getting transportation to medical appointments?: No          REVIEW OF SYSTEMS:   Patient-reported ROS  Constitutional  Sleep Disturbance: admits  Chills: denies  Fever: denies  Weight Gain: denies  Weight Loss: denies   Cardiovascular  Chest Pain: denies  Irregular Heartbeat: denies   Respiratory  Painful Breathing: denies  Wheezing: denies   Gastrointestinal  Bowel Incontinence: denies  Heartburn: denies  Abdominal Pain: denies  Blood in Stool : denies  Rectal Pain: denies   Hematology  Easy Bruising: denies  Easy Bleeding: denies   Genitourinary  Difficulty Urinating: denies  Bladder Incontinence: denies  Pelvic Pain: denies  Painful Urination: denies   Musculoskeletal  Joint Stiffness: denies  Painful Joints: denies  Tailbone Pain: denies  Swollen Joints: denies   Peripheral Vascular  Swelling of Legs/Feet: denies  Cold Extremities: denies   Skin  Open Sores: denies  Nodules or Lumps: denies  Rash: denies   Neurological  Loss of Strength Since last Visit: denies  Tingling/Numbness: denies  Balance: denies   Psychiatric  Anxiety: denies  Depressed Mood: denies       PHYSICAL EXAM:   General: No immediate distress  Head: Normocephalic/ Atraumatic  Eyes: Extra-occular movements intact.   Ears: No auricular hematoma or deformities  Mouth: No lesions or ulcerations  Heart: peripheral pulses intact. Normal capillary refill.   Lungs: Non-labored respirations  Abdomen: No abdominal  guarding  Extremities: No lower extremity edema bilaterally   Skin: No lesions noted   Cognition: alert & oriented x 3, attentive, able to follow 2 step commands, comprehention intact, spontaneous speech intact  Psychiatric: Mood and affect appropriate      LABS:     Lab Results   Component Value Date     (H) 07/17/2024    A1C 6.5 (H) 07/17/2024     Lab Results   Component Value Date    WBC 9.5 08/19/2024    RBC 4.91 08/19/2024    HGB 13.6 08/19/2024    HCT 41.7 08/19/2024    MCV 84.9 08/19/2024    MCH 27.7 08/19/2024    MCHC 32.6 08/19/2024    RDW 14.4 08/19/2024    .0 08/19/2024     Lab Results   Component Value Date     (H) 08/23/2024    BUN 13 08/23/2024    BUNCREA 16.3 08/23/2024    CREATSERUM 0.80 08/23/2024    ANIONGAP 5 08/23/2024    CA 9.6 08/23/2024    OSMOCALC 294 08/23/2024    ALKPHO 97 08/23/2024    AST 53 (H) 08/23/2024    ALT 75 (H) 08/23/2024    BILT 0.6 08/23/2024    TP 7.9 08/23/2024    ALB 4.6 08/23/2024    GLOBULIN 3.3 08/23/2024     08/23/2024    K 4.7 08/23/2024     08/23/2024    CO2 30.0 08/23/2024     No results found for: \"PTP\", \"PT\", \"INR\"  Lab Results   Component Value Date    VITD 38.1 07/17/2024

## 2024-10-24 NOTE — DISCHARGE INSTRUCTIONS
POST-OPERATIVE GENERAL INSTRUCTIONS      POST PROCEDURE CARE AND INFECTION PREVENTION:  1.  Your dressing may be removed in 24 hours.  If it falls off before that time, you need not reapply.  2.  Call your Doctor for UNUSUAL conditions such as: bleeding, increased pain, severe headache, or a temperature greater than 101 degrees.  If you experience any increasing numbness or tingling or have any changes in your bowel or bladder habits, please notify the office.  3.  Ice the injection site for 15 minutes every hour during the day of your injection.  4.  You may shower tomorrow, but no tub baths or hot tubs for 2 days.  5.  If you have any questions please call your Doctor's office.  The office phone number is [phone].  6.  Please call to make an appointment for your follow-up visit as instructed by your Doctor.  7.  You may return to previous activities tomorrow.  8.  You may return to school or work per your Doctor's instructions.  9.  Wash your hands before and after touching your dressing.  Be sure to rub your hands together with warm water and soap for 20 seconds or longer when washing.      MEDICATION:  1.  If you take any anti-inflammatory medications, you may resume taking them in 24 hours.  2.  If you take Metformin, you may resume taking it in 2 days.  3.  You may resume all other usual medications unless instructed otherwise by your Doctor.  4.  See Medication Reconciliation Form for any new prescriptions.  5.  Do not drive, operate machinery or drink alcoholic beverages while taking narcotic pain medication.  Narcotic pain medication may cause constipation.  If no bowel movement in 48 hours, please contact your physician.        IN CASE OF EMERGENCY:  If you are unable to reach your Doctor, call or go to the nearest emergency room.  The [facility] Emergency Department's phone number is [phone].

## 2024-10-28 ENCOUNTER — HOSPITAL ENCOUNTER (OUTPATIENT)
Facility: HOSPITAL | Age: 59
Setting detail: HOSPITAL OUTPATIENT SURGERY
Discharge: HOME | End: 2024-10-28
Attending: PHYSICAL MEDICINE & REHABILITATION | Admitting: PHYSICAL MEDICINE & REHABILITATION
Payer: MEDICAID

## 2024-10-28 ENCOUNTER — APPOINTMENT (OUTPATIENT)
Dept: PEDIATRICS CLINIC | Facility: CLINIC | Age: 59
End: 2024-10-28
Payer: MEDICAID

## 2024-10-28 ENCOUNTER — APPOINTMENT (OUTPATIENT)
Dept: GENERAL RADIOLOGY | Facility: HOSPITAL | Age: 59
End: 2024-10-28
Attending: PHYSICAL MEDICINE & REHABILITATION
Payer: MEDICAID

## 2024-10-28 VITALS
RESPIRATION RATE: 14 BRPM | SYSTOLIC BLOOD PRESSURE: 136 MMHG | BODY MASS INDEX: 50.02 KG/M2 | WEIGHT: 293 LBS | OXYGEN SATURATION: 98 % | HEART RATE: 78 BPM | HEIGHT: 64 IN | DIASTOLIC BLOOD PRESSURE: 74 MMHG

## 2024-10-28 PROCEDURE — 64494 INJ PARAVERT F JNT L/S 2 LEV: CPT | Performed by: PHYSICAL MEDICINE & REHABILITATION

## 2024-10-28 PROCEDURE — 3E0R3BZ INTRODUCTION OF ANESTHETIC AGENT INTO SPINAL CANAL, PERCUTANEOUS APPROACH: ICD-10-PCS | Performed by: PHYSICAL MEDICINE & REHABILITATION

## 2024-10-28 PROCEDURE — 64493 INJ PARAVERT F JNT L/S 1 LEV: CPT | Performed by: PHYSICAL MEDICINE & REHABILITATION

## 2024-10-28 PROCEDURE — 3E0R33Z INTRODUCTION OF ANTI-INFLAMMATORY INTO SPINAL CANAL, PERCUTANEOUS APPROACH: ICD-10-PCS | Performed by: PHYSICAL MEDICINE & REHABILITATION

## 2024-10-28 RX ORDER — LIDOCAINE HYDROCHLORIDE 10 MG/ML
INJECTION, SOLUTION EPIDURAL; INFILTRATION; INTRACAUDAL; PERINEURAL AS NEEDED
Status: DISCONTINUED | OUTPATIENT
Start: 2024-10-28 | End: 2024-10-28 | Stop reason: HOSPADM

## 2024-10-28 RX ORDER — DEXAMETHASONE SODIUM PHOSPHATE 10 MG/ML
INJECTION, SOLUTION INTRAMUSCULAR; INTRAVENOUS AS NEEDED
Status: DISCONTINUED | OUTPATIENT
Start: 2024-10-28 | End: 2024-10-28 | Stop reason: HOSPADM

## 2024-10-28 RX ORDER — IOPAMIDOL 408 MG/ML
INJECTION, SOLUTION INTRATHECAL AS NEEDED
Status: DISCONTINUED | OUTPATIENT
Start: 2024-10-28 | End: 2024-10-28 | Stop reason: HOSPADM

## 2024-10-28 NOTE — INTERVAL H&P NOTE
Pre-op Diagnosis: Lumbar facet arthropathy [M47.816]    The above referenced H&P was reviewed by Chantel De La Rosa DO on 10/28/2024, the patient was examined and no significant changes have occurred in the patient's condition since the H&P was performed.  I discussed with the patient and/or legal representative the potential benefits, risks and side effects of this procedure; the likelihood of the patient achieving goals; and potential problems that might occur during recuperation.  I discussed reasonable alternatives to the procedure, including risks, benefits and side effects related to the alternatives and risks related to not receiving this procedure.  We will proceed with procedure as planned.

## 2024-10-28 NOTE — OPERATIVE REPORT
[facility]    LUMBAR Z-JOINT/FACET INJECTIONS  NAME:  Judi White    MR #:    [id number] :  1965     PHYSICIAN:  Chantel De La Rosa DO        Operative Report    DATE OF PROCEDURE: 10/28/2024   PREOPERATIVE DIAGNOSES: Lumbar facet arthropathy [M47.816]   POSTOPERATIVE DIAGNOSES:   Lumbar facet arthropathy [M47.816]   PROCEDURES: bilateral L4-5 and L5-S1 Z-joint/facet injection done under fluoroscopic guidance with contrast enhancement.   SURGEON: Chantel De La Rosa DO   ANESTHESIA: Local   INDICATIONS:      OPERATIVE PROCEDURE:  Written consent was obtained from the patient.  The patient was brought into the operating room and placed in the prone position on the fluoroscopy table with a pillow underneath the abdomen.  The patient's skin was cleaned and draped in a normal sterile fashion.  Using AP fluoroscopy, all five lumbar vertebrae were identified.  Then the bilateral L4-5 and L5-S1 z-joints were identified on AP view.  At this point in time, the patient's skin was anesthetized with 1 to 2 cc of 1% PF lidocaine without epinephrine over each joint site.  Then, 5 inch, 22-gauge spinal needles were inserted and directed towards the bilateral L4-5 and L5-S1 z-joints .  When it felt to be in good position, bilateral anterior oblique fluoroscopy was used to advance the needle into the correct z-joint.  At this point in time, Omnipaque-240 contrast was used to obtain a good athrogram indicating correct needle placement.  Then, aspiration was performed.  No blood, fluid, or air was aspirated and each joint was injected with a 1 cc solution of 1/2 cc of 40 mg/cc of Kenalog and 1/2 cc of 1% PF lidocaine without epinephrine.  After this, the needles were removed.  The patient's skin was cleaned.  Band-Aids were applied.  The patient was transferred to the cart and into [facility].  The patient was given discharge instructions and will follow up in the clinic as scheduled.  Throughout the whole  procedure, the patient's pulse oximetry and vital signs were monitored and they remained completely stable.  Also, throughout the whole procedure, prior to injection of any medication, aspiration was performed.  No blood, fluid, or air was aspirated at anytime.    Chantel De La Rosa DO, FAAPMR & CAQSM  Physical Medicine and Rehabilitation/Sports Medicine  [facility]

## 2024-11-02 ENCOUNTER — LAB ENCOUNTER (OUTPATIENT)
Dept: LAB | Age: 59
End: 2024-11-02
Attending: INTERNAL MEDICINE
Payer: MEDICAID

## 2024-11-02 ENCOUNTER — HOSPITAL ENCOUNTER (OUTPATIENT)
Dept: GENERAL RADIOLOGY | Age: 59
Discharge: HOME | End: 2024-11-02
Attending: INTERNAL MEDICINE
Payer: MEDICAID

## 2024-11-02 ENCOUNTER — OFFICE VISIT (OUTPATIENT)
Dept: INTERNAL MEDICINE CLINIC | Facility: CLINIC | Age: 59
End: 2024-11-02
Payer: MEDICAID

## 2024-11-02 VITALS
HEIGHT: 64 IN | DIASTOLIC BLOOD PRESSURE: 85 MMHG | WEIGHT: 293 LBS | SYSTOLIC BLOOD PRESSURE: 139 MMHG | HEART RATE: 76 BPM | BODY MASS INDEX: 50.02 KG/M2

## 2024-11-02 DIAGNOSIS — M79.18 MYOFASCIAL PAIN SYNDROME OF THORACIC SPINE: ICD-10-CM

## 2024-11-02 DIAGNOSIS — E78.49 OTHER HYPERLIPIDEMIA: ICD-10-CM

## 2024-11-02 DIAGNOSIS — R60.0 BILATERAL LEG EDEMA: ICD-10-CM

## 2024-11-02 DIAGNOSIS — R74.8 ELEVATED LIVER ENZYMES: ICD-10-CM

## 2024-11-02 DIAGNOSIS — E66.813 CLASS 3 SEVERE OBESITY DUE TO EXCESS CALORIES WITHOUT SERIOUS COMORBIDITY WITH BODY MASS INDEX (BMI) OF 50.0 TO 59.9 IN ADULT (HCC): ICD-10-CM

## 2024-11-02 DIAGNOSIS — E11.9 TYPE 2 DIABETES MELLITUS WITHOUT COMPLICATION, WITHOUT LONG-TERM CURRENT USE OF INSULIN (HCC): ICD-10-CM

## 2024-11-02 DIAGNOSIS — Z00.00 ANNUAL PHYSICAL EXAM: Primary | ICD-10-CM

## 2024-11-02 DIAGNOSIS — K59.01 SLOW TRANSIT CONSTIPATION: ICD-10-CM

## 2024-11-02 DIAGNOSIS — S69.91XA INJURY OF FINGER OF RIGHT HAND, INITIAL ENCOUNTER: ICD-10-CM

## 2024-11-02 DIAGNOSIS — E66.01 CLASS 3 SEVERE OBESITY DUE TO EXCESS CALORIES WITHOUT SERIOUS COMORBIDITY WITH BODY MASS INDEX (BMI) OF 50.0 TO 59.9 IN ADULT (HCC): ICD-10-CM

## 2024-11-02 LAB
ALBUMIN SERPL-MCNC: 4.8 G/DL (ref 3.2–4.8)
ALBUMIN/GLOB SERPL: 1.5 {RATIO} (ref 1–2)
ALP LIVER SERPL-CCNC: 89 U/L
ALT SERPL-CCNC: 108 U/L
ANION GAP SERPL CALC-SCNC: 10 MMOL/L (ref 0–18)
AST SERPL-CCNC: 58 U/L (ref ?–34)
BASOPHILS # BLD AUTO: 0.05 X10(3) UL (ref 0–0.2)
BASOPHILS NFR BLD AUTO: 0.5 %
BILIRUB SERPL-MCNC: 0.7 MG/DL (ref 0.3–1.2)
BUN BLD-MCNC: 19 MG/DL (ref 9–23)
BUN/CREAT SERPL: 25.7 (ref 10–20)
CALCIUM BLD-MCNC: 9.8 MG/DL (ref 8.7–10.4)
CHLORIDE SERPL-SCNC: 108 MMOL/L (ref 98–112)
CHOLEST SERPL-MCNC: 200 MG/DL (ref ?–200)
CO2 SERPL-SCNC: 26 MMOL/L (ref 21–32)
CREAT BLD-MCNC: 0.74 MG/DL
DEPRECATED RDW RBC AUTO: 47.1 FL (ref 35.1–46.3)
EGFRCR SERPLBLD CKD-EPI 2021: 93 ML/MIN/1.73M2 (ref 60–?)
EOSINOPHIL # BLD AUTO: 0.13 X10(3) UL (ref 0–0.7)
EOSINOPHIL NFR BLD AUTO: 1.3 %
ERYTHROCYTE [DISTWIDTH] IN BLOOD BY AUTOMATED COUNT: 15.3 % (ref 11–15)
EST. AVERAGE GLUCOSE BLD GHB EST-MCNC: 154 MG/DL (ref 68–126)
FASTING PATIENT LIPID ANSWER: YES
FASTING STATUS PATIENT QL REPORTED: YES
GLOBULIN PLAS-MCNC: 3.1 G/DL (ref 2–3.5)
GLUCOSE BLD-MCNC: 129 MG/DL (ref 70–99)
HBA1C MFR BLD: 7 % (ref ?–5.7)
HCT VFR BLD AUTO: 39.8 %
HDLC SERPL-MCNC: 52 MG/DL (ref 40–59)
HGB BLD-MCNC: 12.7 G/DL
IMM GRANULOCYTES # BLD AUTO: 0.02 X10(3) UL (ref 0–1)
IMM GRANULOCYTES NFR BLD: 0.2 %
LDLC SERPL CALC-MCNC: 133 MG/DL (ref ?–100)
LYMPHOCYTES # BLD AUTO: 3.2 X10(3) UL (ref 1–4)
LYMPHOCYTES NFR BLD AUTO: 30.8 %
MCH RBC QN AUTO: 26.7 PG (ref 26–34)
MCHC RBC AUTO-ENTMCNC: 31.9 G/DL (ref 31–37)
MCV RBC AUTO: 83.6 FL
MONOCYTES # BLD AUTO: 0.83 X10(3) UL (ref 0.1–1)
MONOCYTES NFR BLD AUTO: 8 %
NEUTROPHILS # BLD AUTO: 6.17 X10 (3) UL (ref 1.5–7.7)
NEUTROPHILS # BLD AUTO: 6.17 X10(3) UL (ref 1.5–7.7)
NEUTROPHILS NFR BLD AUTO: 59.2 %
NONHDLC SERPL-MCNC: 148 MG/DL (ref ?–130)
OSMOLALITY SERPL CALC.SUM OF ELEC: 302 MOSM/KG (ref 275–295)
PLATELET # BLD AUTO: 270 10(3)UL (ref 150–450)
POTASSIUM SERPL-SCNC: 4.8 MMOL/L (ref 3.5–5.1)
PROT SERPL-MCNC: 7.9 G/DL (ref 5.7–8.2)
RBC # BLD AUTO: 4.76 X10(6)UL
SODIUM SERPL-SCNC: 144 MMOL/L (ref 136–145)
TRIGL SERPL-MCNC: 84 MG/DL (ref 30–149)
VLDLC SERPL CALC-MCNC: 15 MG/DL (ref 0–30)
WBC # BLD AUTO: 10.4 X10(3) UL (ref 4–11)

## 2024-11-02 PROCEDURE — 36415 COLL VENOUS BLD VENIPUNCTURE: CPT

## 2024-11-02 PROCEDURE — 80061 LIPID PANEL: CPT

## 2024-11-02 PROCEDURE — 85025 COMPLETE CBC W/AUTO DIFF WBC: CPT

## 2024-11-02 PROCEDURE — 99396 PREV VISIT EST AGE 40-64: CPT | Performed by: INTERNAL MEDICINE

## 2024-11-02 PROCEDURE — 83036 HEMOGLOBIN GLYCOSYLATED A1C: CPT

## 2024-11-02 PROCEDURE — 73140 X-RAY EXAM OF FINGER(S): CPT | Performed by: INTERNAL MEDICINE

## 2024-11-02 PROCEDURE — 80053 COMPREHEN METABOLIC PANEL: CPT

## 2024-11-02 NOTE — TELEPHONE ENCOUNTER
Cologuard  Specimen: Stool - Rectum structure (body structure)  Component  Ref Range & Units 4 d ago Comments   Test Result  N/A Sample Could Not Be Processed 6 The specimen was not collected according to the provided instructions. The patient will be contacted to initiate a new sample collection.
[employer] Eula Boyer #[id number]    I spoke to the pt with the help of a [facility] interpretor. Pt was not aware her specimen was not obtained correctly    I called [employer] and spoke to [place].  She told me they will be reaching out to the pt today    They need to send her a new kit to obtain a new specimen    Someone will be going over the instructions with her    I let Babar Talley know pt will need a [facility] interpretor
No.

## 2024-11-05 ENCOUNTER — NURSE ONLY (OUTPATIENT)
Dept: ALLERGY | Facility: CLINIC | Age: 59
End: 2024-11-05

## 2024-11-05 ENCOUNTER — OFFICE VISIT (OUTPATIENT)
Dept: ALLERGY | Facility: CLINIC | Age: 59
End: 2024-11-05
Payer: MEDICAID

## 2024-11-05 DIAGNOSIS — Z91.018 FOOD ALLERGY: ICD-10-CM

## 2024-11-05 DIAGNOSIS — J30.2 SEASONAL AND PERENNIAL ALLERGIC RHINOCONJUNCTIVITIS: Primary | ICD-10-CM

## 2024-11-05 DIAGNOSIS — Z23 FLU VACCINE NEED: ICD-10-CM

## 2024-11-05 DIAGNOSIS — J30.89 SEASONAL AND PERENNIAL ALLERGIC RHINOCONJUNCTIVITIS: Primary | ICD-10-CM

## 2024-11-05 DIAGNOSIS — T50.Z95A ADVERSE EFFECT OF VACCINE, INITIAL ENCOUNTER: ICD-10-CM

## 2024-11-05 DIAGNOSIS — H10.10 SEASONAL AND PERENNIAL ALLERGIC RHINOCONJUNCTIVITIS: Primary | ICD-10-CM

## 2024-11-05 DIAGNOSIS — L29.9 PRURITUS: ICD-10-CM

## 2024-11-05 DIAGNOSIS — Z23 NEED FOR COVID-19 VACCINE: ICD-10-CM

## 2024-11-05 PROCEDURE — 95004 PERQ TESTS W/ALRGNC XTRCS: CPT | Performed by: ALLERGY & IMMUNOLOGY

## 2024-11-05 PROCEDURE — 95024 IQ TESTS W/ALLERGENIC XTRCS: CPT | Performed by: ALLERGY & IMMUNOLOGY

## 2024-11-05 PROCEDURE — 99204 OFFICE O/P NEW MOD 45 MIN: CPT | Performed by: ALLERGY & IMMUNOLOGY

## 2024-11-05 RX ORDER — LEVOCETIRIZINE DIHYDROCHLORIDE 5 MG/1
5 TABLET, FILM COATED ORAL EVERY EVENING
Qty: 90 TABLET | Refills: 1 | Status: SHIPPED | OUTPATIENT
Start: 2024-11-05

## 2024-11-05 RX ORDER — FLUOCINOLONE ACETONIDE 0.11 MG/ML
OIL TOPICAL
Qty: 118 ML | Refills: 0 | Status: SHIPPED | OUTPATIENT
Start: 2024-11-05

## 2024-11-05 NOTE — PATIENT INSTRUCTIONS
#1 allergic rhinitis  See above skin testing to screen for allergic triggers  Reviewed avoidance measures and potential treatment option immunotherapy  Trial of Xyzal, levocetirizine 5 mg once a day as an antihistamine for symptoms of runny nose sneezing itching.  May add Flonase or Nasacort if having prominent nasal congestion or postnasal drip      #2 adverse reaction to vaccine.  See above clinical history.  No prior hepatitis B vaccine in the past.  Reviewed with patient that if no prior hepatitis vaccine in the past and no signs of allergies.  The recommendation would be to vaccinate with hepatitis B and observed for 30 minutes if patient has concerns.    #3 pruritus  Reviewed prior labs including CBC TSH CMP from earlier this year.  See above skin testing to screen for allergic triggers  Patient reports concern for foods contributing to itching.  No specific food by history  See above skin testing to common food allergens  Dove is a soap, moisturizers include Cetaphil CeraVe Vanicream Aquaphor  Trial of Derma-Smoothe twice a day    #4 flu vaccine recommended in the fall  Please obtain flu vaccine through [employer] [facility].    #5 COVID-vaccine booster recommended.  Please check with [employer] [facility] as we do not stock this vaccine         Orders This Visit:  No orders of the defined types were placed in this encounter.      Meds This Visit:  Requested Prescriptions     Signed Prescriptions Disp Refills    Fluocinolone Acetonide Body (DERMA-SMOOTHE/FS BODY) 0.01 % External Oil 118 mL 0     Sig: Apply twice a day to involved areas of itching and rash    levocetirizine 5 MG Oral Tab 90 tablet 1     Sig: Take 1 tablet (5 mg total) by mouth every evening.

## 2024-11-05 NOTE — PROGRESS NOTES
Judi White is a 59 year old female.    HPI:     Chief Complaint   Patient presents with    Allergies     Pt in for consult, would like allergy testing     Patient is a 59-year-old female who presents for allergy consultation upon referral of her PCP, nurse practitioner Reese  Prior note from visit with PCP from 2024 recommended allergy and dermatology evaluation.  PCP  initiated treatment with triamcinolone and ketoconazole    Review of records show patient seen by dermatology Dr. Gonzalez on 2024.  Medications listed include Lidex hydrocortisone triamcinolone     today patient reports      Allergies?   Duration: this year   Timing: YR   Symptoms:  rn deonna jones   Severity: moderate   Status: no change   Triggers: allergies?   Pets: none   Nonsmoker   Had  Hep a  abs  had Hep A vaccine as child   Pt reports no Hep B abs  Per pt no prior Hep B vaccine in past . Need Hep B vaccine   Patient reports that doctor in [facility] told her the hepatitis B is not a good vaccine for her.  No other rationale behind it.  No prior hepatitis B vaccine in the past.    Hx of asthma, ad, or food allergy: denies     Food allergy? Pruritus   Labs from 2024 reviewed cbc, tsh, cmp     Prior derm eval for hair loss           HISTORY:  Past Medical History:    Amenorrhea    Anxiety    Diabetes (HCC)    High blood pressure    High cholesterol    Hypertension    Mixed hyperlipidemia    Obesity    Scoliosis    Shortness of breath    Sleep apnea    needs new CPAP machine    Varicose vein      Past Surgical History:   Procedure Laterality Date      1989    Upper gi endoscopy,exam        History reviewed. No pertinent family history.   Social History:   Social History     Socioeconomic History    Marital status:    Tobacco Use    Smoking status: Never     Passive exposure: Never    Smokeless tobacco: Never   Vaping Use    Vaping status: Never Used   Substance and Sexual Activity    Alcohol use: Not  Currently    Drug use: Never    Sexual activity: Yes   Other Topics Concern    Caffeine Concern No    Exercise No    Seat Belt No    Special Diet No    Stress Concern No    Weight Concern Yes    Grew up on a farm Yes    History of tanning Yes    Outdoor occupation Yes    Breast feeding No    Reaction to local anesthetic No    Pt has a pacemaker No    Pt has a defibrillator No     Social Drivers of Health     Food Insecurity: Low Risk  (4/20/2024)    Received from [facility]    Food Insecurity     Have there been times that your food ran out, and you didn't have money to get more?: No     Are there times that you worry that this might happen?: No   Transportation Needs: Low Risk  (4/20/2024)    Received from [facility]    Transportation Needs     Do you have trouble getting transportation to medical appointments?: No        Medications (Active prior to today's visit):  Current Outpatient Medications   Medication Sig Dispense Refill    Fluocinolone Acetonide Body (DERMA-SMOOTHE/FS BODY) 0.01 % External Oil Apply twice a day to involved areas of itching and rash 118 mL 0    levocetirizine 5 MG Oral Tab Take 1 tablet (5 mg total) by mouth every evening. 90 tablet 1    valsartan 160 MG Oral Tab Take 1 tablet (160 mg total) by mouth daily. 90 tablet 3    metFORMIN  MG Oral Tablet 24 Hr Take 1 tablet (750 mg total) by mouth daily. 90 tablet 2    atorvastatin 10 MG Oral Tab Take 1 tablet (10 mg total) by mouth nightly. 90 tablet 3    ergocalciferol 1.25 MG (06076 UT) Oral Cap Take 1 capsule (50,000 Units total) by mouth once a week. 12 capsule 1    ondansetron (ZOFRAN) 4 mg tablet Take 1 tablet (4 mg total) by mouth every 12 (twelve) hours as needed for Nausea. 20 tablet 0    Potassium Chloride ER 10 MEQ Oral Tab CR Take 1 tab po daily  together  with  furosemide 90 tablet 0    Liraglutide (VICTOZA) 18 MG/3ML Subcutaneous Solution Pen-injector Inject 1.8 mg subcutaneously  daily (Patient not taking: Reported on 11/5/2024) 9 mL 1    gabapentin 300 MG Oral Cap Take 1 capsule (300 mg total) by mouth As Directed. Take 1 capsule (300 mg total) nightly for 3 days, if tolerated, add morning dose of 1 capsule (300 mg total) for 3 days and if tolerated add afternoon dose of 1 capsule (300 mg ) .  NOTE: increase doses slowly to minimize side effects. 90 capsule 0    Insulin Pen Needle (PEN NEEDLES) 32G X 4 MM Does not apply Misc 1 each daily. 90 each 0    ketoconazole 2 % External Cream Apply to affected area twice daily 30 g 0    PEG 3350-KCl-Na Bicarb-NaCl (TRILYTE) 420 g Oral Recon Soln Take prep as directed by gastro office. May substitute with Trilyte/generic equivalent if needed. (Patient not taking: Reported on 11/2/2024) 4000 mL 0    Fluocinonide 0.05 % External Solution Apply twice daily  Monday-Friday. Take weekends off. Use on scalp 60 mL 12    hydrocortisone 2.5 % External Cream Apply rectally twice daily and after each bowel movement 28 g 0    triamcinolone 0.1 % External Cream Apply topically 2 (two) times daily as needed. 60 g 3    furosemide 20 MG Oral Tab Take  1 tab po daily 90 tablet 0       Allergies:  Allergies[1]      ROS:     Allergic/Immuno:  See HPI  Cardiovascular:  Negative for irregular heartbeat/palpitations, chest pain, edema  Constitutional:  Negative night sweats,weight loss, irritability and lethargy  Endocrine:  Negative for cold intolerance, polydipsia and polyphagia  ENMT:  Negative for ear drainage, hearing loss and nasal drainage  Eyes:  Negative for eye discharge and vision loss  Gastrointestinal:  Negative for abdominal pain, diarrhea and vomiting  Genitourinary:  Negative for dysuria and hematuria  Hema/Lymph:  Negative for easy bleeding and easy bruising  Integumentary:  Negative for pruritus and rash  Musculoskeletal:  Negative for joint symptoms  Neurological:  Negative for dizziness, seizures  Psychiatric:  Negative for inappropriate interaction and  psychiatric symptoms  Respiratory:  Negative for cough, dyspnea and wheezing      PHYSICAL EXAM:   Constitutional: responsive, no acute distress noted  Head/Face: NC/Atraumatic  Eyes/Vision: conjunctiva and lids are normal extraocular motion is intact   Ears/Audiometry: tympanic membranes are normal bilaterally hearing is grossly intact  Nose/Mouth/Throat: nose and throat are clear mucous membranes are moist   Neck/Thyroid: neck is supple without adenopathy  Lymphatic: no abnormal cervical, supraclavicular or axillary adenopathy is noted  Respiratory: normal to inspection lungs are clear to auscultation bilaterally normal respiratory effort   Cardiovascular: regular rate and rhythm no murmurs, gallups, or rubs  Abdomen: soft non-tender non-distended  Skin/Hair: no unusual rashes present  Extremities: no edema, cyanosis, or clubbing  Neurological:Oriented to time, place, person & situation       ASSESSMENT/PLAN:   Assessment   Encounter Diagnoses   Name Primary?    Seasonal and perennial allergic rhinoconjunctivitis Yes    Adverse effect of vaccine, initial encounter     Pruritus     Flu vaccine need     Need for COVID-19 vaccine      Skin testing today to common indoor and outdoor environmental allergies was+ to dust mite     Skin testing today to common foods including milk egg wheat soy almond walnut peanut was negative     Positive histamine control    #1 allergic rhinitis  See above skin testing to screen for allergic triggers  Reviewed avoidance measures and potential treatment option immunotherapy  Trial of Xyzal, levocetirizine 5 mg once a day as an antihistamine for symptoms of runny nose sneezing itching.  May add Flonase or Nasacort if having prominent nasal congestion or postnasal drip      #2 adverse reaction to vaccine.  See above clinical history.  No prior hepatitis B vaccine in the past.  Reviewed with patient that if no prior hepatitis vaccine in the past and no signs of allergies.  The  recommendation would be to vaccinate with hepatitis B  thru pcp and observed for 30 minutes if patient has concerns.    #3 pruritus  Reviewed prior labs including CBC TSH CMP from earlier this year.  See above skin testing to screen for allergic triggers  Patient reports concern for foods contributing to itching.  No specific food by history  See above skin testing to common food allergens  Dove is a soap, moisturizers include Cetaphil CeraVe Vanicream Aquaphor  Trial of Derma-Smoothe twice a day    #4 flu vaccine recommended in the fall  Please obtain flu vaccine through [facility].    #5 COVID-vaccine booster recommended.  Please check with [facility] as we do not stock this vaccine         Orders This Visit:  No orders of the defined types were placed in this encounter.      Meds This Visit:  Requested Prescriptions     Signed Prescriptions Disp Refills    Fluocinolone Acetonide Body (DERMA-SMOOTHE/FS BODY) 0.01 % External Oil 118 mL 0     Sig: Apply twice a day to involved areas of itching and rash    levocetirizine 5 MG Oral Tab 90 tablet 1     Sig: Take 1 tablet (5 mg total) by mouth every evening.       Imaging & Referrals:  None     11/5/2024  Cecil Mosley MD      If medication samples were provided today, they were provided solely for patient education and training related to self administration of these medications.  Teaching, instruction and sample was provided to the patient by myself.  Teaching included  a review of potential adverse side effects as well as potential efficacy.  Patient's questions were answered in regards to medication administration and dosing and potential side effects. Teaching was provided via the teach back method         [1]   Allergies  Allergen Reactions    Trulicity [Dulaglutide] NAUSEA ONLY    Lactose ITCHING

## 2024-11-07 ENCOUNTER — TELEPHONE (OUTPATIENT)
Dept: INTERNAL MEDICINE CLINIC | Facility: CLINIC | Age: 59
End: 2024-11-07

## 2024-11-07 NOTE — TELEPHONE ENCOUNTER
Patient family member called regarding the Hepatitis vaccine. Advised of the note below and placed the information on patient's my chart.

## 2024-11-07 NOTE — TELEPHONE ENCOUNTER
Spoke to patient, she wants to proceed with hepatitis B vaccination, I advised her that she needs 3 injection 1 month, 2 months in 6 months, she can make nursing appointment in the office will check with insurance for coverage

## 2024-11-08 ENCOUNTER — TELEPHONE (OUTPATIENT)
Dept: INTERNAL MEDICINE CLINIC | Facility: CLINIC | Age: 59
End: 2024-11-08

## 2024-11-08 NOTE — PROGRESS NOTES
Subjective:     Patient ID: Judi White is a 59 year old female.  Is for physical exam and follow-up on multiple medical conditions.    HPI  History taken in [place] patient may take language.  She is frustrated that she cannot lose weight he has been taking Victoza 1.8 mg last 3 months, following a very strict diet even daughter-in-law who is present in the room confirms that patient barely eats, she tries to walk around for exercise perhaps could do more exercise.  Patient weight is 311 pounds, BMI 53.38, she has gained about 30 pounds since coming to [place] 1 year ago from [facility].  She is dealing with a chronic low back pain received steroidal injection 2 weeks ago which so far is not helpful.  Patient follows with weight loss specialist, also diagnosed with diabetes mellitus and has been taking metformin 750 mg twice a day.  Today's hemoglobin A1c is 7.0 up from 6.5 several months ago.  She has elevated liver enzymes had extensive workup no other reason for elevation of liver enzymes were found, very concerned about that.  Would like to receive hepatitis B vaccination she was recommended.  Patient is snoring at night, was seen by ENT specialist recommended to consider surgical intervention but she is very high risk due to body habitus and this is causing anxiety  Patient slipped and fell yesterday, he has pain in the right thumb it is slightly swollen  Review of Systems       Constitutional:  Negative for decreased activity, fever, irritability and lethargy  Cardiovascular:  Negative for chest pain and irregular heartbeat/palpitations  Respiratory:  Negative for cough, dyspnea and wheezing.  Eyes:  Negative for eye discharge and vision loss  Endocrine:  Negative for polydipsia and polyphagia  Integumentary:  Negative for pruritus and rash  Neurological:  Negative for gait disturbance, paresthesias.   Psychiatric:  Negative for inappropriate interaction and psychiatric symptoms  Current Outpatient  Medications   Medication Sig Dispense Refill    Liraglutide (VICTOZA) 18 MG/3ML Subcutaneous Solution Pen-injector Inject 1.8 mg subcutaneously daily (Patient not taking: Reported on 11/5/2024) 9 mL 1    gabapentin 300 MG Oral Cap Take 1 capsule (300 mg total) by mouth As Directed. Take 1 capsule (300 mg total) nightly for 3 days, if tolerated, add morning dose of 1 capsule (300 mg total) for 3 days and if tolerated add afternoon dose of 1 capsule (300 mg ) .  NOTE: increase doses slowly to minimize side effects. 90 capsule 0    Insulin Pen Needle (PEN NEEDLES) 32G X 4 MM Does not apply Misc 1 each daily. 90 each 0    valsartan 160 MG Oral Tab Take 1 tablet (160 mg total) by mouth daily. 90 tablet 3    ketoconazole 2 % External Cream Apply to affected area twice daily 30 g 0    metFORMIN  MG Oral Tablet 24 Hr Take 1 tablet (750 mg total) by mouth daily. 90 tablet 2    ergocalciferol 1.25 MG (08168 UT) Oral Cap Take 1 capsule (50,000 Units total) by mouth once a week. 12 capsule 1    Fluocinonide 0.05 % External Solution Apply twice daily  Monday-Friday. Take weekends off. Use on scalp 60 mL 12    hydrocortisone 2.5 % External Cream Apply rectally twice daily and after each bowel movement 28 g 0    ondansetron (ZOFRAN) 4 mg tablet Take 1 tablet (4 mg total) by mouth every 12 (twelve) hours as needed for Nausea. 20 tablet 0    triamcinolone 0.1 % External Cream Apply topically 2 (two) times daily as needed. 60 g 3    Potassium Chloride ER 10 MEQ Oral Tab CR Take 1 tab po daily  together  with  furosemide 90 tablet 0    furosemide 20 MG Oral Tab Take  1 tab po daily 90 tablet 0    semaglutide (OZEMPIC, 0.25 OR 0.5 MG/DOSE,) 2 MG/3ML Subcutaneous Solution Pen-injector Inject 0.25 mg into the skin once a week. 3 mL 0    Fluocinolone Acetonide Body (DERMA-SMOOTHE/FS BODY) 0.01 % External Oil Apply twice a day to involved areas of itching and rash 118 mL 0    levocetirizine 5 MG Oral Tab Take 1 tablet (5 mg total) by  mouth every evening. 90 tablet 1    PEG 3350-KCl-Na Bicarb-NaCl (TRILYTE) 420 g Oral Recon Soln Take prep as directed by gastro office. May substitute with Trilyte/generic equivalent if needed. (Patient not taking: Reported on 2024) 4000 mL 0    atorvastatin 10 MG Oral Tab Take 1 tablet (10 mg total) by mouth nightly. 90 tablet 3     Allergies:Allergies[1]    Past Medical History:    Amenorrhea    Anxiety    Diabetes (HCC)    High blood pressure    High cholesterol    Hypertension    Mixed hyperlipidemia    Obesity    Scoliosis    Shortness of breath    Sleep apnea    needs new CPAP machine    Varicose vein      Past Surgical History:   Procedure Laterality Date      1989    Upper gi endoscopy,exam        History reviewed. No pertinent family history.   Social History:   Social History     Socioeconomic History    Marital status:    Tobacco Use    Smoking status: Never     Passive exposure: Never    Smokeless tobacco: Never   Vaping Use    Vaping status: Never Used   Substance and Sexual Activity    Alcohol use: Not Currently    Drug use: Never    Sexual activity: Yes   Other Topics Concern    Caffeine Concern No    Exercise No    Seat Belt No    Special Diet No    Stress Concern No    Weight Concern Yes    Grew up on a farm Yes    History of tanning Yes    Outdoor occupation Yes    Breast feeding No    Reaction to local anesthetic No    Pt has a pacemaker No    Pt has a defibrillator No     Social Drivers of Health     Food Insecurity: Low Risk  (2024)    Received from [facility]    Food Insecurity     Have there been times that your food ran out, and you didn't have money to get more?: No     Are there times that you worry that this might happen?: No   Transportation Needs: Low Risk  (2024)    Received from [facility]    Transportation Needs     Do you have trouble getting transportation to medical appointments?: No        /85 (BP  Location: Left arm, Patient Position: Sitting, Cuff Size: large)   Pulse 76   Ht 5' 4\" (1.626 m)   Wt (!) 311 lb (141.1 kg)   BMI 53.38 kg/m²    Physical Exam  Constitutional:       Appearance: Normal appearance. She is obese.   HENT:      Head: Normocephalic and atraumatic.   Eyes:      General: No scleral icterus.     Extraocular Movements: Extraocular movements intact.      Conjunctiva/sclera: Conjunctivae normal.      Pupils: Pupils are equal, round, and reactive to light.   Neck:      Vascular: No carotid bruit.   Cardiovascular:      Rate and Rhythm: Normal rate and regular rhythm.      Heart sounds: No murmur heard.     No gallop.   Pulmonary:      Effort: Pulmonary effort is normal. No respiratory distress.      Breath sounds: No wheezing or rhonchi.   Abdominal:      General: Bowel sounds are normal. There is no distension.      Palpations: Abdomen is soft.      Tenderness: There is no abdominal tenderness.   Musculoskeletal:         General: Normal range of motion.      Cervical back: Normal range of motion and neck supple.      Right lower leg: No edema.      Left lower leg: No edema.   Lymphadenopathy:      Cervical: No cervical adenopathy.   Skin:     General: Skin is warm.      Coloration: Skin is not jaundiced.   Neurological:      General: No focal deficit present.      Mental Status: She is alert and oriented to person, place, and time. Mental status is at baseline.   Psychiatric:         Mood and Affect: Mood normal.         Behavior: Behavior normal.         Thought Content: Thought content normal.         Assessment & Plan:   1. Annual physical exam    2. Type 2 diabetes mellitus without complication, without long-term current use of insulin (HCC) uncontrolled, will consider changing Victoza to Ozempic injection every 7 days, discussed side effects with patient such as nausea, bloating, constipation   3. Other hyperlipidemia check lipids, monitor liver enzymes continue atorvastatin 10 mg for  now until test results available   4. Injury of finger of right hand, initial encounter rule out fracture will get x-ray of the right thumb advised to keep thumb elevated to immobilize for few days   5. Elevated liver enzymes continue to monitor periodically especially with statin on board   6. Class 3 severe obesity due to excess calories without serious comorbidity with body mass index (BMI) of 50.0 to 59.9 in adult (HCC) continue calorie restricted diet, regular physical activities as tolerated, advised patient consider pool exercises if feasible   7. Myofascial pain syndrome of thoracic spine    8. Slow transit constipation patient scheduled for colonoscopy   9. Bilateral leg edema continue to keep legs elevated when resting, compression support stockings advised     Patient can get hepatitis B vaccine, advised patient to test her injections, she can make nursing appointment, will get medication at the pharmacy.  I did advise patient that vaccination is not covered in our office  Orders Placed This Encounter   Procedures    CBC With Differential With Platelet    Comp Metabolic Panel (14)    Lipid Panel    Hemoglobin A1C    Hepatic Function Panel (7) [E]       Meds This Visit:  Requested Prescriptions      No prescriptions requested or ordered in this encounter     Follow-up in 3 months  Imaging & Referrals:  DME DIABETIC TEST STRIPS AND SUPPLIES            [1]   Allergies  Allergen Reactions    Trulicity [Dulaglutide] NAUSEA ONLY    Lactose ITCHING

## 2024-11-08 NOTE — TELEPHONE ENCOUNTER
semaglutide (OZEMPIC, 0.25 OR 0.5 MG/DOSE,) 2 MG/3ML Subcutaneous Solution Pen-injector, Inject 0.25 mg into the skin once a week., Disp: 3 mL, Rfl: 0

## 2024-11-13 RX ORDER — SEMAGLUTIDE 0.68 MG/ML
INJECTION, SOLUTION SUBCUTANEOUS
Qty: 3 ML | Refills: 0 | OUTPATIENT
Start: 2024-11-13

## 2024-11-21 ENCOUNTER — HOSPITAL ENCOUNTER (OUTPATIENT)
Dept: GENERAL RADIOLOGY | Age: 59
Discharge: HOME | End: 2024-11-21
Attending: PHYSICAL MEDICINE & REHABILITATION
Payer: MEDICAID

## 2024-11-21 ENCOUNTER — HOSPITAL ENCOUNTER (OUTPATIENT)
Age: 59
Discharge: HOME | End: 2024-11-21
Payer: MEDICAID

## 2024-11-21 ENCOUNTER — OFFICE VISIT (OUTPATIENT)
Dept: PHYSICAL MEDICINE AND REHAB | Facility: CLINIC | Age: 59
End: 2024-11-21
Payer: MEDICAID

## 2024-11-21 VITALS
HEART RATE: 79 BPM | SYSTOLIC BLOOD PRESSURE: 135 MMHG | DIASTOLIC BLOOD PRESSURE: 82 MMHG | OXYGEN SATURATION: 99 % | TEMPERATURE: 98 F | RESPIRATION RATE: 16 BRPM

## 2024-11-21 VITALS
WEIGHT: 293 LBS | DIASTOLIC BLOOD PRESSURE: 84 MMHG | SYSTOLIC BLOOD PRESSURE: 156 MMHG | BODY MASS INDEX: 50.02 KG/M2 | HEIGHT: 64 IN

## 2024-11-21 DIAGNOSIS — E66.01 MORBID OBESITY WITH BMI OF 50.0-59.9, ADULT (HCC): ICD-10-CM

## 2024-11-21 DIAGNOSIS — M79.18 CERVICAL MYOFASCIAL PAIN SYNDROME: Primary | ICD-10-CM

## 2024-11-21 DIAGNOSIS — R53.83 FATIGUE, UNSPECIFIED TYPE: Primary | ICD-10-CM

## 2024-11-21 DIAGNOSIS — M54.16 BILATERAL LUMBAR RADICULOPATHY: ICD-10-CM

## 2024-11-21 DIAGNOSIS — M79.18 CERVICAL MYOFASCIAL PAIN SYNDROME: ICD-10-CM

## 2024-11-21 LAB
#MXD IC: 0.5 X10ˆ3/UL (ref 0.1–1)
ATRIAL RATE: 77 BPM
BILIRUB UR QL STRIP: NEGATIVE
BUN BLD-MCNC: 17 MG/DL (ref 7–18)
CHLORIDE BLD-SCNC: 107 MMOL/L (ref 98–112)
CLARITY UR: CLEAR
CO2 BLD-SCNC: 25 MMOL/L (ref 21–32)
CREAT BLD-MCNC: 0.8 MG/DL
EGFRCR SERPLBLD CKD-EPI 2021: 85 ML/MIN/1.73M2 (ref 60–?)
GLUCOSE BLD-MCNC: 99 MG/DL (ref 70–99)
GLUCOSE UR STRIP-MCNC: NEGATIVE MG/DL
HCT VFR BLD AUTO: 43.1 %
HCT VFR BLD CALC: 46 %
HGB BLD-MCNC: 13.2 G/DL
HGB UR QL STRIP: NEGATIVE
ISTAT IONIZED CALCIUM FOR CHEM 8: 1.21 MMOL/L (ref 1.12–1.32)
KETONES UR STRIP-MCNC: NEGATIVE MG/DL
LEUKOCYTE ESTERASE UR QL STRIP: NEGATIVE
LYMPHOCYTES # BLD AUTO: 2.9 X10ˆ3/UL (ref 1–4)
LYMPHOCYTES NFR BLD AUTO: 37.8 %
MCH RBC QN AUTO: 26.9 PG (ref 26–34)
MCHC RBC AUTO-ENTMCNC: 30.6 G/DL (ref 31–37)
MCV RBC AUTO: 87.8 FL (ref 80–100)
MIXED CELL %: 6.2 %
NEUTROPHILS # BLD AUTO: 4.2 X10ˆ3/UL (ref 1.5–7.7)
NEUTROPHILS NFR BLD AUTO: 56 %
NITRITE UR QL STRIP: NEGATIVE
P AXIS: 40 DEGREES
P-R INTERVAL: 180 MS
PH UR STRIP: 5.5 [PH]
PLATELET # BLD AUTO: 251 X10ˆ3/UL (ref 150–450)
POTASSIUM BLD-SCNC: 5.2 MMOL/L (ref 3.6–5.1)
PROT UR STRIP-MCNC: NEGATIVE MG/DL
Q-T INTERVAL: 370 MS
QRS DURATION: 82 MS
QTC CALCULATION (BEZET): 418 MS
R AXIS: -27 DEGREES
RBC # BLD AUTO: 4.91 X10ˆ6/UL
SODIUM BLD-SCNC: 142 MMOL/L (ref 136–145)
SP GR UR STRIP: >=1.03
T AXIS: 58 DEGREES
TROPONIN I BLD-MCNC: <0.02 NG/ML
UROBILINOGEN UR STRIP-ACNC: <2 MG/DL
VENTRICULAR RATE: 77 BPM
WBC # BLD AUTO: 7.6 X10ˆ3/UL (ref 4–11)

## 2024-11-21 PROCEDURE — 93005 ELECTROCARDIOGRAM TRACING: CPT

## 2024-11-21 PROCEDURE — 84484 ASSAY OF TROPONIN QUANT: CPT

## 2024-11-21 PROCEDURE — 80047 BASIC METABLC PNL IONIZED CA: CPT

## 2024-11-21 PROCEDURE — 93010 ELECTROCARDIOGRAM REPORT: CPT

## 2024-11-21 PROCEDURE — 99214 OFFICE O/P EST MOD 30 MIN: CPT

## 2024-11-21 PROCEDURE — 81002 URINALYSIS NONAUTO W/O SCOPE: CPT

## 2024-11-21 PROCEDURE — 85025 COMPLETE CBC W/AUTO DIFF WBC: CPT | Performed by: PHYSICIAN ASSISTANT

## 2024-11-21 PROCEDURE — 36415 COLL VENOUS BLD VENIPUNCTURE: CPT

## 2024-11-21 PROCEDURE — 99214 OFFICE O/P EST MOD 30 MIN: CPT | Performed by: PHYSICAL MEDICINE & REHABILITATION

## 2024-11-21 PROCEDURE — 72050 X-RAY EXAM NECK SPINE 4/5VWS: CPT | Performed by: PHYSICAL MEDICINE & REHABILITATION

## 2024-11-21 RX ORDER — MELOXICAM 15 MG/1
15 TABLET ORAL DAILY
Qty: 14 TABLET | Refills: 0 | Status: SHIPPED | OUTPATIENT
Start: 2024-11-21 | End: 2024-12-05

## 2024-11-21 NOTE — PROGRESS NOTES
[facility]  FOLLOW UP EVALUATION      HISTORY OF PRESENT ILLNESS:     Chief Complaint   Patient presents with    Follow - Up     INJ: 10/28/2024 pt comes in to f/u on Bilateral L4-5 and L5-S1 facet joint injection. 45% improvement. Presents with midline low back pain. Denies T/N or weakness. Rates pain 4/10. Also c/o posterior neck pain and headache. Rates pain 9/10. Takes ibuprofen PRN.       The patient is a 59 year old female with significant past medical history of anxiety, hypertension, sleep apnea, varicose veins, amenorrhea, morbid obesity who presents for follow-up evaluation of bilateral low back pain as well as new evaluation of neck pain.  She had facet joint injections for the lumbar spine which has provided good improvement.  The neck pain has been ongoing for the last 3 to 4 days.  She recently went to [place] and was sitting for prolonged periods in a car and feels like this aggravated the pain.  Now the pain is present and constant at rest but also aggravated with movement of the cervical spine specifically with forward flexion.  She denies any radiating symptoms in the arms, any numbness tingling or weakness.  She rates pain to be 4 out of 10.  She rates pain to be up to 9 out of 10.     PHYSICAL EXAM:   /84   Ht 64\"   Wt (!) 311 lb (141.1 kg)   BMI 53.38 kg/m²     Gait  Able to toe walk and heel walk without any difficulty    LUMBAR SPINE:  Inspection: no erythema, swelling, or obvious deformity.  Their iliac crest and shoulder heights are symmetrical.     Palpation: Non tender to palpation of the spinous process.   ROM: FAROM but pain with end range of flexion and extension.  Strength: 5/5 in bilateral lower extremities  Sensation: Intact to light touch in all dermatomes of the lower extremities  Reflexes: 2/4 at L4 and S1  Facet Loading: Negative bilateral lower lumbar facet joints  Straight leg raise: negative for radicular pain  symptoms  Slump test: negative for pain symptoms for radicular pain symptoms    Full active range of motion of the cervical spine but pain reproduced with forward flexion and extension.  Mild tenderness to palpation of the cervical thoracic junction.  Strength is 5 out of 5 in bilateral upper extremities, sensation intact to light touch.  Reflexes +2 and symmetric at C5 and C6 bilaterally.  Spurling's negative.  Negative Radha's      IMAGING:     MRI lumbar spine completed on 10/3/2024 was personally reviewed which is notable for mild degenerative changes at L4-5 there is a broad-based degenerative disc bulge with moderate bilateral facet arthropathy and mild bilateral neuroforaminal narrowing.  There is moderate broad-based degenerative disc bulge at L5-S1 eccentric to the left posterior lateral aspect of the disc space.  There is moderate left neuroforaminal narrowing.  The facet joint degenerative changes as well.    Xray Lumbar spine completed 6/4/2024 is notable for mild degenerative disc disease as well as spondylosis in the lumbar spine    All imaging results were reviewed and discussed with patient.      ASSESSMENT/PLAN:     1. Cervical myofascial pain syndrome    2. Bilateral lumbar radiculopathy    3. Morbid obesity with BMI of 50.0-59.9, adult (HCC)        Judi White is a 58-year-old female presenting today for follow-up evaluation of low back pain with lumbar facet arthropathy after lumbar facet joint injections which has provided good improvement.  She is having persistent neck pain with cervical myofascial symptoms but also with poor posture overall and thoracic kyphosis noted.  She needs good physical therapy for the neck which I have recommended now and topical treatment as well as meloxicam 15 mg daily for the next 2 weeks.  Recommend she follow-up with me in 2 months in the office.      The patient verbalized understanding with the plan and was in agreement. All questions/concerns were  addressed and there were no barriers to learning.  Please note Dragon dictation software was used to dictate this note and may result in inadvertent typos.    Chantel De La Rosa DO, FAAPMR & CAQSM  Physical Medicine and Rehabilitation  Sports and Spine Medicine    PAST MEDICAL HISTORY:     Past Medical History:    Amenorrhea    Anxiety    Diabetes (HCC)    High blood pressure    High cholesterol    Hypertension    Mixed hyperlipidemia    Obesity    Scoliosis    Shortness of breath    Sleep apnea    needs new CPAP machine    Varicose vein         PAST SURGICAL HISTORY:     Past Surgical History:   Procedure Laterality Date      1989    Upper gi endoscopy,exam           CURRENT MEDICATIONS:     Current Outpatient Medications   Medication Sig Dispense Refill    Meloxicam (MOBIC) 15 MG Oral Tab Take 1 tablet (15 mg total) by mouth daily for 14 days. 14 tablet 0    semaglutide (OZEMPIC, 0.25 OR 0.5 MG/DOSE,) 2 MG/3ML Subcutaneous Solution Pen-injector Inject 0.25 mg into the skin once a week. 3 mL 0    Fluocinolone Acetonide Body (DERMA-SMOOTHE/FS BODY) 0.01 % External Oil Apply twice a day to involved areas of itching and rash 118 mL 0    levocetirizine 5 MG Oral Tab Take 1 tablet (5 mg total) by mouth every evening. 90 tablet 1    Liraglutide (VICTOZA) 18 MG/3ML Subcutaneous Solution Pen-injector Inject 1.8 mg subcutaneously daily (Patient not taking: Reported on 2024) 9 mL 1    gabapentin 300 MG Oral Cap Take 1 capsule (300 mg total) by mouth As Directed. Take 1 capsule (300 mg total) nightly for 3 days, if tolerated, add morning dose of 1 capsule (300 mg total) for 3 days and if tolerated add afternoon dose of 1 capsule (300 mg ) .  NOTE: increase doses slowly to minimize side effects. 90 capsule 0    Insulin Pen Needle (PEN NEEDLES) 32G X 4 MM Does not apply Misc 1 each daily. 90 each 0    valsartan 160 MG Oral Tab Take 1 tablet (160 mg total) by mouth daily. 90 tablet 3    ketoconazole 2 % External Cream  Apply to affected area twice daily 30 g 0    metFORMIN  MG Oral Tablet 24 Hr Take 1 tablet (750 mg total) by mouth daily. 90 tablet 2    PEG 3350-KCl-Na Bicarb-NaCl (TRILYTE) 420 g Oral Recon Soln Take prep as directed by gastro office. May substitute with Trilyte/generic equivalent if needed. (Patient not taking: Reported on 11/2/2024) 4000 mL 0    atorvastatin 10 MG Oral Tab Take 1 tablet (10 mg total) by mouth nightly. 90 tablet 3    ergocalciferol 1.25 MG (10095 UT) Oral Cap Take 1 capsule (50,000 Units total) by mouth once a week. 12 capsule 1    Fluocinonide 0.05 % External Solution Apply twice daily  Monday-Friday. Take weekends off. Use on scalp 60 mL 12    hydrocortisone 2.5 % External Cream Apply rectally twice daily and after each bowel movement 28 g 0    ondansetron (ZOFRAN) 4 mg tablet Take 1 tablet (4 mg total) by mouth every 12 (twelve) hours as needed for Nausea. 20 tablet 0    triamcinolone 0.1 % External Cream Apply topically 2 (two) times daily as needed. 60 g 3    Potassium Chloride ER 10 MEQ Oral Tab CR Take 1 tab po daily  together  with  furosemide 90 tablet 0    furosemide 20 MG Oral Tab Take  1 tab po daily 90 tablet 0         ALLERGIES:     Allergies   Allergen Reactions    Trulicity [Dulaglutide] NAUSEA ONLY    Lactose ITCHING         FAMILY HISTORY:   No family history on file.       SOCIAL HISTORY:     Social History     Socioeconomic History    Marital status:    Tobacco Use    Smoking status: Never     Passive exposure: Never    Smokeless tobacco: Never   Vaping Use    Vaping status: Never Used   Substance and Sexual Activity    Alcohol use: Not Currently    Drug use: Never    Sexual activity: Yes   Other Topics Concern    Caffeine Concern No    Exercise No    Seat Belt No    Special Diet No    Stress Concern No    Weight Concern Yes    Grew up on a farm Yes    History of tanning Yes    Outdoor occupation Yes    Breast feeding No    Reaction to local anesthetic No    Pt  has a pacemaker No    Pt has a defibrillator No     Social Drivers of Health     Food Insecurity: Low Risk  (4/20/2024)    Received from [facility]    Food Insecurity     Have there been times that your food ran out, and you didn't have money to get more?: No     Are there times that you worry that this might happen?: No   Transportation Needs: Low Risk  (4/20/2024)    Received from [facility]    Transportation Needs     Do you have trouble getting transportation to medical appointments?: No          REVIEW OF SYSTEMS:   Patient-reported ROS  Constitutional  Sleep Disturbance: admits  Chills: denies  Fever: denies  Weight Gain: denies  Weight Loss: denies   Cardiovascular  Chest Pain: denies  Irregular Heartbeat: denies   Respiratory  Painful Breathing: denies  Wheezing: denies   Gastrointestinal  Bowel Incontinence: denies  Heartburn: denies  Abdominal Pain: denies  Blood in Stool : denies  Rectal Pain: denies   Hematology  Easy Bruising: denies  Easy Bleeding: denies   Genitourinary  Difficulty Urinating: denies  Bladder Incontinence: denies  Pelvic Pain: denies  Painful Urination: denies   Musculoskeletal  Joint Stiffness: denies  Painful Joints: denies  Tailbone Pain: denies  Swollen Joints: denies   Peripheral Vascular  Swelling of Legs/Feet: denies  Cold Extremities: denies   Skin  Open Sores: denies  Nodules or Lumps: denies  Rash: denies   Neurological  Loss of Strength Since last Visit: denies  Tingling/Numbness: denies  Balance: denies   Psychiatric  Anxiety: denies  Depressed Mood: denies       PHYSICAL EXAM:   General: No immediate distress  Head: Normocephalic/ Atraumatic  Eyes: Extra-occular movements intact.   Ears: No auricular hematoma or deformities  Mouth: No lesions or ulcerations  Heart: peripheral pulses intact. Normal capillary refill.   Lungs: Non-labored respirations  Abdomen: No abdominal guarding  Extremities: No lower extremity edema bilaterally    Skin: No lesions noted   Cognition: alert & oriented x 3, attentive, able to follow 2 step commands, comprehention intact, spontaneous speech intact  Psychiatric: Mood and affect appropriate      LABS:     Lab Results   Component Value Date     (H) 11/02/2024    A1C 7.0 (H) 11/02/2024     Lab Results   Component Value Date    WBC 10.4 11/02/2024    RBC 4.76 11/02/2024    HGB 12.7 11/02/2024    HCT 39.8 11/02/2024    MCV 87.8 11/21/2024    MCH 26.7 11/02/2024    MCHC 30.6 (L) 11/21/2024    RDW 15.3 (H) 11/02/2024    .0 11/02/2024     Lab Results   Component Value Date     (H) 11/02/2024    BUN 19 11/02/2024    BUNCREA 25.7 (H) 11/02/2024    CREATSERUM 0.74 11/02/2024    ANIONGAP 10 11/02/2024    CA 9.8 11/02/2024    OSMOCALC 302 (H) 11/02/2024    ALKPHO 89 11/02/2024    AST 58 (H) 11/02/2024     (H) 11/02/2024    BILT 0.7 11/02/2024    TP 7.9 11/02/2024    ALB 4.8 11/02/2024    GLOBULIN 3.1 11/02/2024     11/02/2024    K 4.8 11/02/2024     11/02/2024    CO2 26.0 11/02/2024     No results found for: \"PTP\", \"PT\", \"INR\"  Lab Results   Component Value Date    VITD 38.1 07/17/2024

## 2024-11-21 NOTE — PATIENT INSTRUCTIONS
-Xray of the cervical spine today  -Start PT and home exercises  -Ice/Heat as tolerated  -Follow up in 2 months in office  -If no better, will discuss MRI or injection

## 2024-11-21 NOTE — ED INITIAL ASSESSMENT (HPI)
Patient with hx of neck strain, states that her neck pain is causing headache x 3 days     Using Meloxicam, Tylenol and Ibuprofen at home without relief     States she sees spots in her vision, no recent head injury    + upper chest discomfort with nausea, no emesis     Taking her BP medications at home

## 2024-11-22 NOTE — ED PROVIDER NOTES
Patient Seen in: [facility] [place]      History     Chief Complaint   Patient presents with    Weakness    Headache     Stated Complaint: pain, high b/p, kidney issue. daughter on phone translating    Subjective:   HPI    59-year-old female with history of hypertension, hyperlipidemia, diabetes now presenting to the immediate care for evaluation of back pain, elevated blood pressure, fatigue.  History is obtained through assistance of [place]  as well as the patient's daughter who is on the phone: She is currently seeing physical medicine and rehab for a neck strain, feels this is causing a generalized headache for the last 3 days.  She is also experiencing upper back and chest discomfort.  Has nausea at times, no vomiting.  She is concerned her blood pressure may be elevated.  Patient denies cough, congestion or URI symptoms.  She denies any shortness of breath.  No recent travel.    Objective:     No pertinent past medical history.            No pertinent past surgical history.              No pertinent social history.            Review of Systems    Positive for stated complaint: pain, high b/p, kidney issue. daughter on phone translating  Other systems are as noted in HPI.  Constitutional and vital signs reviewed.      All other systems reviewed and negative except as noted above.    Physical Exam     ED Triage Vitals [11/21/24 1015]   /82   Pulse 79   Resp 16   Temp 98 °F (36.7 °C)   Temp src Oral   SpO2 99 %   O2 Device None (Room air)       Current Vitals:   Vital Signs  BP: 135/82  Pulse: 79  Resp: 16  Temp: 98 °F (36.7 °C)  Temp src: Oral    Oxygen Therapy  SpO2: 99 %  O2 Device: None (Room air)        Physical Exam  Vitals and nursing note reviewed.   Constitutional:       General: She is not in acute distress.  HENT:      Head: Normocephalic and atraumatic.      Right Ear: External ear normal.      Left Ear: External ear normal.      Nose: Nose normal.      Mouth/Throat:       Mouth: Mucous membranes are moist.   Eyes:      Extraocular Movements: Extraocular movements intact.      Pupils: Pupils are equal, round, and reactive to light.   Cardiovascular:      Rate and Rhythm: Normal rate.      Heart sounds: No murmur heard.  Pulmonary:      Effort: Pulmonary effort is normal.      Breath sounds: No wheezing or rhonchi.   Abdominal:      General: Abdomen is flat.   Musculoskeletal:         General: Normal range of motion.      Cervical back: Normal range of motion.   Skin:     General: Skin is warm.   Neurological:      General: No focal deficit present.      Mental Status: She is alert and oriented to person, place, and time.      Cranial Nerves: No cranial nerve deficit.      Motor: No weakness.   Psychiatric:         Mood and Affect: Mood normal.         Behavior: Behavior normal.             ED Course     Labs Reviewed   POCT CBC - Abnormal; Notable for the following components:       Result Value    MCHC IC 30.6 (*)     All other components within normal limits   POCT ISTAT CHEM8 CARTRIDGE - Abnormal; Notable for the following components:    ISTAT Potassium 5.2 (*)     All other components within normal limits   ISTAT TROPONIN - Normal   EM POCT URINALYSIS DIPSTICK     EKG    Rate, intervals and axes as noted on EKG Report.  Rate: 77  Rhythm: Sinus Rhythm  Reading: Sinus rhythm, heart rate 77.  No arrhythmia, no ectopy.  No STEMI.  There is no significant change when compared to EKG dated 8/2024            59-year-old female presenting with complaint of back pain, headache, neck strain.  She is somewhat vague in her description of these symptoms.  States she came to the immediate care today because she wanted to check on her heart and kidneys.  She is afebrile and very well-appearing.  Blood pressure 135/82.  She is compliant with her medications.  UA is negative for infection.  CBC, BMP reassuring and the troponin is negative.    Most of patient's symptoms are somewhat chronic in  nature, she has been evaluated recently by physical medicine and rehab, ophthalmology and her primary care provider.  Ddx-back strain, spasm, anginal equivalent, UTI    Discussed reassuring workup with patient and her daughters.  She will follow-up with her primary care provider.  ER return precautions advised           MDM              Medical Decision Making      Disposition and Plan     Clinical Impression:  1. Fatigue, unspecified type         Disposition:  Discharge  11/21/2024 11:21 am    Follow-up:  Estelle Multani MD  [address] [place] [address] [phone]      follow up to discuss immediate care visit and labs          Medications Prescribed:  Discharge Medication List as of 11/21/2024 11:22 AM              Supplementary Documentation:

## 2024-11-25 NOTE — PAT NURSING NOTE
Spoke to patient for PAT via . Instructed patient to call Dr. Mayer's office regarding when to start holding metformin prior to procedure. Informed patient to hold Ozempic 1 week before procedure. Patient verbalized understanding and all questions answered at this time.

## 2024-11-26 ENCOUNTER — OFFICE VISIT (OUTPATIENT)
Dept: INTERNAL MEDICINE CLINIC | Facility: CLINIC | Age: 59
End: 2024-11-26

## 2024-11-26 ENCOUNTER — NURSE TRIAGE (OUTPATIENT)
Dept: INTERNAL MEDICINE CLINIC | Facility: CLINIC | Age: 59
End: 2024-11-26

## 2024-11-26 VITALS
SYSTOLIC BLOOD PRESSURE: 126 MMHG | BODY MASS INDEX: 50.02 KG/M2 | HEART RATE: 81 BPM | WEIGHT: 293 LBS | DIASTOLIC BLOOD PRESSURE: 85 MMHG | HEIGHT: 64 IN

## 2024-11-26 DIAGNOSIS — N95.0 POST-MENOPAUSAL BLEEDING: Primary | ICD-10-CM

## 2024-11-26 PROCEDURE — 99214 OFFICE O/P EST MOD 30 MIN: CPT | Performed by: NURSE PRACTITIONER

## 2024-11-26 NOTE — TELEPHONE ENCOUNTER
Action Requested: Summary for Provider     []  Critical Lab, Recommendations Needed  [] Need Additional Advice  []   FYI    []   Need Orders  [] Need Medications Sent to Pharmacy  []  Other     SUMMARY: Per Protocol disposition advised to be seen in the office for possible vaginal bleeding/spotting.  Future Appointments   Date Time Provider Department Center   2024  5:40 PM Chata Leigh APRN [id number] [facility] [place]   2024 10:00 AM SAMEER, PROCEDURE [id number] None   2025  8:15 AM Chantel De La Rosa DO [facility] [place] [facility] [place]   2025  2:20 PM Alannah Jackson PA-C [id number] [address]   2025  7:15 AM Eyad Gonzalez MD [facility]     Reason for call: Acute  Onset: yesterday    Patient's DIL, Melvina calling (name and , SISI verified) for patient that c/o vaginal bleeding describes as dark blood, small amount. Noted-three way galindo with patient and DIL on the phone. Denies any burning with urination. Denies any diarrhea or vomiting. Denies any weakness of dizziness.     Denies any abd pain. Denies any back pain.   Reason for Disposition   Postmenopausal vaginal bleeding    Protocols used: Vaginal Bleeding - [id number]

## 2024-11-27 NOTE — PROGRESS NOTES
Judi White is a 59 year old female.  HPI:   Pt reports LMP 2019, noticed vaginal bleeding that started today. She reports it was enough to soak through undergarments. She denies any pelvic pain, vaginal discharge,   Current Outpatient Medications   Medication Sig Dispense Refill    omeprazole 20 MG Oral Capsule Delayed Release Take 1 capsule (20 mg total) by mouth every morning before breakfast.      Meloxicam (MOBIC) 15 MG Oral Tab Take 1 tablet (15 mg total) by mouth daily for 14 days. 14 tablet 0    semaglutide (OZEMPIC, 0.25 OR 0.5 MG/DOSE,) 2 MG/3ML Subcutaneous Solution Pen-injector Inject 0.25 mg into the skin once a week. 3 mL 0    Fluocinolone Acetonide Body (DERMA-SMOOTHE/FS BODY) 0.01 % External Oil Apply twice a day to involved areas of itching and rash 118 mL 0    levocetirizine 5 MG Oral Tab Take 1 tablet (5 mg total) by mouth every evening. 90 tablet 1    gabapentin 300 MG Oral Cap Take 1 capsule (300 mg total) by mouth As Directed. Take 1 capsule (300 mg total) nightly for 3 days, if tolerated, add morning dose of 1 capsule (300 mg total) for 3 days and if tolerated add afternoon dose of 1 capsule (300 mg ) .  NOTE: increase doses slowly to minimize side effects. 90 capsule 0    Insulin Pen Needle (PEN NEEDLES) 32G X 4 MM Does not apply Misc 1 each daily. 90 each 0    valsartan 160 MG Oral Tab Take 1 tablet (160 mg total) by mouth daily. 90 tablet 3    ketoconazole 2 % External Cream Apply to affected area twice daily 30 g 0    metFORMIN  MG Oral Tablet 24 Hr Take 1 tablet (750 mg total) by mouth daily. 90 tablet 2    PEG 3350-KCl-Na Bicarb-NaCl (TRILYTE) 420 g Oral Recon Soln Take prep as directed by gastro office. May substitute with Trilyte/generic equivalent if needed. 4000 mL 0    atorvastatin 10 MG Oral Tab Take 1 tablet (10 mg total) by mouth nightly. 90 tablet 3    ergocalciferol 1.25 MG (08562 UT) Oral Cap Take 1 capsule (50,000 Units total) by mouth once a week. 12 capsule 1     Fluocinonide 0.05 % External Solution Apply twice daily  Monday-Friday. Take weekends off. Use on scalp 60 mL 12    hydrocortisone 2.5 % External Cream Apply rectally twice daily and after each bowel movement 28 g 0    triamcinolone 0.1 % External Cream Apply topically 2 (two) times daily as needed. 60 g 3    Potassium Chloride ER 10 MEQ Oral Tab CR Take 1 tab po daily  together  with  furosemide 90 tablet 0    furosemide 20 MG Oral Tab Take  1 tab po daily 90 tablet 0      Past Medical History:    Amenorrhea    Anxiety    Diabetes (HCC)    High blood pressure    High cholesterol    Hypertension    Mixed hyperlipidemia    Obesity    Scoliosis    Shortness of breath    Sleep apnea    needs new CPAP machine    Varicose vein      Social History:  Social History     Socioeconomic History    Marital status:    Tobacco Use    Smoking status: Never     Passive exposure: Never    Smokeless tobacco: Never   Vaping Use    Vaping status: Never Used   Substance and Sexual Activity    Alcohol use: Not Currently    Drug use: Never    Sexual activity: Yes   Other Topics Concern    Caffeine Concern No    Exercise No    Seat Belt No    Special Diet No    Stress Concern No    Weight Concern Yes    Grew up on a farm Yes    History of tanning Yes    Outdoor occupation Yes    Breast feeding No    Reaction to local anesthetic No    Pt has a pacemaker No    Pt has a defibrillator No     Social Drivers of Health     Food Insecurity: Low Risk  (4/20/2024)    Received from [facility]    Food Insecurity     Have there been times that your food ran out, and you didn't have money to get more?: No     Are there times that you worry that this might happen?: No   Transportation Needs: Low Risk  (4/20/2024)    Received from [facility]    Transportation Needs     Do you have trouble getting transportation to medical appointments?: No        REVIEW OF SYSTEMS:   Review of Systems   All other systems  reviewed and are negative.         EXAM:   /85 (BP Location: Left arm, Patient Position: Sitting, Cuff Size: large)   Pulse 81   Ht 5' 4\" (1.626 m)   Wt (!) 311 lb (141.1 kg)   BMI 53.38 kg/m²     Physical Exam  Vitals reviewed.   Constitutional:       General: She is not in acute distress.     Appearance: She is obese.   HENT:      Head: Normocephalic.   Eyes:      General: No scleral icterus.     Conjunctiva/sclera: Conjunctivae normal.      Pupils: Pupils are equal, round, and reactive to light.   Cardiovascular:      Rate and Rhythm: Normal rate and regular rhythm.      Pulses: Normal pulses.      Heart sounds: Normal heart sounds.   Pulmonary:      Effort: Pulmonary effort is normal.      Breath sounds: Normal breath sounds.   Abdominal:      General: Abdomen is flat. Bowel sounds are normal. There is no distension.      Palpations: Abdomen is soft. There is no mass.      Tenderness: There is no abdominal tenderness. There is no right CVA tenderness, left CVA tenderness, guarding or rebound.      Hernia: No hernia is present.   Genitourinary:     Comments: deferred  Skin:     General: Skin is warm.      Coloration: Skin is not jaundiced or pale.   Neurological:      Mental Status: She is alert and oriented to person, place, and time.   Psychiatric:         Mood and Affect: Mood normal.         Judgment: Judgment normal.            ASSESSMENT AND PLAN:   1. Post-menopausal bleeding  -Urine and labs ordered.  -Pelvic US ordered. Hx of fibroids reported.   -Referral to Gyne, advised to schedule asap.   -Reviewed concerning s/s.   - Urinalysis, Routine [E]; Future  - Urine Culture, Routine [E]; Future  - TSH W Reflex To Free T4 [E]; Future  - US PELVIS (TRANSABDOMINAL AND TRANSVAGINAL) (CPT=76856/89136); Future  - OBG Referral - In Network  - CBC W Differential W Platelet [E]; Future       The patient indicates understanding of these issues and agrees to the plan.  The patient is asked to return in 1  week.     The above note was creating using Dragon speech recognition technology. Please excuse any typos.

## 2024-11-30 ENCOUNTER — LAB ENCOUNTER (OUTPATIENT)
Dept: LAB | Age: 59
End: 2024-11-30
Attending: NURSE PRACTITIONER
Payer: MEDICAID

## 2024-11-30 DIAGNOSIS — N95.0 POST-MENOPAUSAL BLEEDING: ICD-10-CM

## 2024-11-30 DIAGNOSIS — R74.8 ELEVATED LIVER ENZYMES: ICD-10-CM

## 2024-11-30 LAB
ALBUMIN SERPL-MCNC: 4.5 G/DL (ref 3.2–4.8)
ALP LIVER SERPL-CCNC: 85 U/L
ALT SERPL-CCNC: 42 U/L
AST SERPL-CCNC: 28 U/L (ref ?–34)
BASOPHILS # BLD AUTO: 0.05 X10(3) UL (ref 0–0.2)
BASOPHILS NFR BLD AUTO: 0.7 %
BILIRUB DIRECT SERPL-MCNC: 0.2 MG/DL (ref ?–0.3)
BILIRUB SERPL-MCNC: 0.6 MG/DL (ref 0.3–1.2)
BILIRUB UR QL: NEGATIVE
COLOR UR: YELLOW
DEPRECATED RDW RBC AUTO: 47.7 FL (ref 35.1–46.3)
EOSINOPHIL # BLD AUTO: 0.17 X10(3) UL (ref 0–0.7)
EOSINOPHIL NFR BLD AUTO: 2.4 %
ERYTHROCYTE [DISTWIDTH] IN BLOOD BY AUTOMATED COUNT: 14.8 % (ref 11–15)
GLUCOSE UR-MCNC: NORMAL MG/DL
HCT VFR BLD AUTO: 40.9 %
HGB BLD-MCNC: 13.1 G/DL
IMM GRANULOCYTES # BLD AUTO: 0.02 X10(3) UL (ref 0–1)
IMM GRANULOCYTES NFR BLD: 0.3 %
KETONES UR-MCNC: NEGATIVE MG/DL
LEUKOCYTE ESTERASE UR QL STRIP.AUTO: 250
LYMPHOCYTES # BLD AUTO: 2.75 X10(3) UL (ref 1–4)
LYMPHOCYTES NFR BLD AUTO: 39.3 %
MCH RBC QN AUTO: 27.9 PG (ref 26–34)
MCHC RBC AUTO-ENTMCNC: 32 G/DL (ref 31–37)
MCV RBC AUTO: 87.2 FL
MONOCYTES # BLD AUTO: 0.58 X10(3) UL (ref 0.1–1)
MONOCYTES NFR BLD AUTO: 8.3 %
NEUTROPHILS # BLD AUTO: 3.42 X10 (3) UL (ref 1.5–7.7)
NEUTROPHILS # BLD AUTO: 3.42 X10(3) UL (ref 1.5–7.7)
NEUTROPHILS NFR BLD AUTO: 49 %
NITRITE UR QL STRIP.AUTO: NEGATIVE
PH UR: 6 [PH] (ref 5–8)
PLATELET # BLD AUTO: 264 10(3)UL (ref 150–450)
PROT SERPL-MCNC: 7.7 G/DL (ref 5.7–8.2)
PROT UR-MCNC: 20 MG/DL
RBC # BLD AUTO: 4.69 X10(6)UL
SP GR UR STRIP: 1.03 (ref 1–1.03)
TSI SER-ACNC: 1.65 UIU/ML (ref 0.55–4.78)
UROBILINOGEN UR STRIP-ACNC: NORMAL
WBC # BLD AUTO: 7 X10(3) UL (ref 4–11)

## 2024-11-30 PROCEDURE — 85025 COMPLETE CBC W/AUTO DIFF WBC: CPT

## 2024-11-30 PROCEDURE — 87086 URINE CULTURE/COLONY COUNT: CPT

## 2024-11-30 PROCEDURE — 36415 COLL VENOUS BLD VENIPUNCTURE: CPT

## 2024-11-30 PROCEDURE — 81001 URINALYSIS AUTO W/SCOPE: CPT

## 2024-11-30 PROCEDURE — 80076 HEPATIC FUNCTION PANEL: CPT

## 2024-11-30 PROCEDURE — 84443 ASSAY THYROID STIM HORMONE: CPT

## 2024-12-02 ENCOUNTER — OFFICE VISIT (OUTPATIENT)
Dept: OBGYN CLINIC | Facility: CLINIC | Age: 59
End: 2024-12-02
Payer: MEDICAID

## 2024-12-02 ENCOUNTER — TELEPHONE (OUTPATIENT)
Dept: INTERNAL MEDICINE CLINIC | Facility: CLINIC | Age: 59
End: 2024-12-02

## 2024-12-02 VITALS
SYSTOLIC BLOOD PRESSURE: 148 MMHG | DIASTOLIC BLOOD PRESSURE: 92 MMHG | WEIGHT: 293 LBS | BODY MASS INDEX: 50.02 KG/M2 | HEIGHT: 64 IN

## 2024-12-02 DIAGNOSIS — E66.01 CLASS 3 SEVERE OBESITY DUE TO EXCESS CALORIES WITH BODY MASS INDEX (BMI) OF 50.0 TO 59.9 IN ADULT, UNSPECIFIED WHETHER SERIOUS COMORBIDITY PRESENT (HCC): ICD-10-CM

## 2024-12-02 DIAGNOSIS — E66.813 CLASS 3 SEVERE OBESITY DUE TO EXCESS CALORIES WITH BODY MASS INDEX (BMI) OF 50.0 TO 59.9 IN ADULT, UNSPECIFIED WHETHER SERIOUS COMORBIDITY PRESENT (HCC): ICD-10-CM

## 2024-12-02 DIAGNOSIS — I10 PRIMARY HYPERTENSION: ICD-10-CM

## 2024-12-02 DIAGNOSIS — N95.0 POSTMENOPAUSAL BLEEDING: Primary | ICD-10-CM

## 2024-12-02 PROCEDURE — 99204 OFFICE O/P NEW MOD 45 MIN: CPT | Performed by: OBSTETRICS & GYNECOLOGY

## 2024-12-02 NOTE — PROGRESS NOTES
HPI:   Judi White is a 59 year old female who presents for a    hx postmenopausal bleeding.    Ultrasound 8/2024 reviewed, pt has new order from jim bernal ordered.   Pt stated had cherry colored spot on 11/26/24 and none after that.  Pt was in [place] prior to that.  No pain.  The dark cherry colored blood was on her underwear, dark cherry colored.  Same amount of blood as a period on her pad/underwear only 1 day per pt through  [facility].  Pt counseled that a biopsy of the uterine lining, either by endometrial biopsy or by hysteroscopy/d & c advised.  Ultrasound pending 12/16/24. Pt had hormone tx 2018 and had bleeding then.  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   Last pap smear unknown.  Pt requested pap today and endometrial biopsy Thursday, pt plans premedication with tylenol/motrin.   Pt's daughter present and all questions  Wt Readings from Last 6 Encounters:   11/26/24 (!) 311 lb (141.1 kg)   11/21/24 (!) 311 lb (141.1 kg)   11/02/24 (!) 311 lb (141.1 kg)   10/24/24 (!) 310 lb (140.6 kg)   08/29/24 (!) 313 lb (142 kg)   08/27/24 (!) 313 lb 12.8 oz (142.3 kg)     There is no height or weight on file to calculate BMI.    Cholesterol, Total (mg/dL)   Date Value   11/02/2024 200 (H)   04/18/2024 196   06/28/2023 208 (H)     HDL Cholesterol (mg/dL)   Date Value   11/02/2024 52   04/18/2024 35 (L)   06/28/2023 37 (L)     LDL Cholesterol (mg/dL)   Date Value   11/02/2024 133 (H)   04/18/2024 136 (H)   06/28/2023 136 (H)     AST (U/L)   Date Value   11/30/2024 28   11/02/2024 58 (H)   08/23/2024 53 (H)     ALT (U/L)   Date Value   11/30/2024 42   11/02/2024 108 (H)   08/23/2024 75 (H)        Current Outpatient Medications   Medication Sig Dispense Refill    omeprazole 20 MG Oral Capsule Delayed Release Take 1 capsule (20 mg total) by mouth every morning before breakfast.      Meloxicam (MOBIC) 15 MG Oral Tab Take 1 tablet (15 mg total) by mouth daily for  14 days. 14 tablet 0    semaglutide (OZEMPIC, 0.25 OR 0.5 MG/DOSE,) 2 MG/3ML Subcutaneous Solution Pen-injector Inject 0.25 mg into the skin once a week. 3 mL 0    Fluocinolone Acetonide Body (DERMA-SMOOTHE/FS BODY) 0.01 % External Oil Apply twice a day to involved areas of itching and rash 118 mL 0    levocetirizine 5 MG Oral Tab Take 1 tablet (5 mg total) by mouth every evening. 90 tablet 1    gabapentin 300 MG Oral Cap Take 1 capsule (300 mg total) by mouth As Directed. Take 1 capsule (300 mg total) nightly for 3 days, if tolerated, add morning dose of 1 capsule (300 mg total) for 3 days and if tolerated add afternoon dose of 1 capsule (300 mg ) .  NOTE: increase doses slowly to minimize side effects. 90 capsule 0    Insulin Pen Needle (PEN NEEDLES) 32G X 4 MM Does not apply Misc 1 each daily. 90 each 0    valsartan 160 MG Oral Tab Take 1 tablet (160 mg total) by mouth daily. 90 tablet 3    ketoconazole 2 % External Cream Apply to affected area twice daily 30 g 0    metFORMIN  MG Oral Tablet 24 Hr Take 1 tablet (750 mg total) by mouth daily. 90 tablet 2    PEG 3350-KCl-Na Bicarb-NaCl (TRILYTE) 420 g Oral Recon Soln Take prep as directed by gastro office. May substitute with Trilyte/generic equivalent if needed. 4000 mL 0    atorvastatin 10 MG Oral Tab Take 1 tablet (10 mg total) by mouth nightly. 90 tablet 3    ergocalciferol 1.25 MG (66857 UT) Oral Cap Take 1 capsule (50,000 Units total) by mouth once a week. 12 capsule 1    Fluocinonide 0.05 % External Solution Apply twice daily  Monday-Friday. Take weekends off. Use on scalp 60 mL 12    hydrocortisone 2.5 % External Cream Apply rectally twice daily and after each bowel movement 28 g 0    triamcinolone 0.1 % External Cream Apply topically 2 (two) times daily as needed. 60 g 3    Potassium Chloride ER 10 MEQ Oral Tab CR Take 1 tab po daily  together  with  furosemide 90 tablet 0    furosemide 20 MG Oral Tab Take  1 tab po daily 90 tablet 0      Past  Medical History:    Amenorrhea    Anxiety    Diabetes (HCC)    High blood pressure    High cholesterol    Hypertension    Mixed hyperlipidemia    Obesity    Scoliosis    Shortness of breath    Sleep apnea    needs new CPAP machine    Varicose vein      Past Surgical History:   Procedure Laterality Date      1989    Upper gi endoscopy,exam        No family history on file.   Social History:   Social History     Socioeconomic History    Marital status:    Tobacco Use    Smoking status: Never     Passive exposure: Never    Smokeless tobacco: Never   Vaping Use    Vaping status: Never Used   Substance and Sexual Activity    Alcohol use: Not Currently    Drug use: Never    Sexual activity: Yes   Other Topics Concern    Caffeine Concern No    Exercise No    Seat Belt No    Special Diet No    Stress Concern No    Weight Concern Yes    Grew up on a farm Yes    History of tanning Yes    Outdoor occupation Yes    Breast feeding No    Reaction to local anesthetic No    Pt has a pacemaker No    Pt has a defibrillator No     Social Drivers of Health     Food Insecurity: Low Risk  (2024)    Received from [facility]    Food Insecurity     Have there been times that your food ran out, and you didn't have money to get more?: No     Are there times that you worry that this might happen?: No   Transportation Needs: Low Risk  (2024)    Received from [facility]    Transportation Needs     Do you have trouble getting transportation to medical appointments?: No            REVIEW OF SYSTEMS:   GENERAL: feels well otherwise  SKIN: denies any unusual skin lesions  EYES:denies blurred vision or double vision  HEENT: denies nasal congestion, sinus pain or ST  LUNGS: denies shortness of breath with exertion  CARDIOVASCULAR: denies chest pain on exertion  GI: denies abdominal pain,denies heartburn  : denies dysuria, vaginal discharge or itching,periods regular    MUSCULOSKELETAL: denies back pain  NEURO: denies headaches  PSYCHE: denies depression or anxiety  HEMATOLOGIC: denies hx of anemia  ENDOCRINE: denies thyroid history  ALL/ASTHMA: denies hx of allergy or asthma    EXAM:   There were no vitals taken for this visit.  There is no height or weight on file to calculate BMI.   GENERAL: well developed, well nourished,in no apparent distress  SKIN: no rashes,no suspicious lesions  HEENT: atraumatic, normocephalic  EYES:normal in appearance  NECK: supple,no adenopathy  CHEST: no chest tenderness  BREAST: no dominant or suspicious mass  LUNGS: clear to auscultation  CARDIO: RRR without murmur  GI: good BS's,no masses, HSM or tenderness -  MUSCULOSKELETAL: back is not tender,FROM of the back  EXTREMITIES: no cyanosis, clubbing or edema  NEURO: Oriented times three    Study Result    Narrative   PROCEDURE: US PELVIS W EV (CPT=76856/80223)     COMPARISON: [facility], CT ABDOMEN + PELVIS (CONTRAST ONLY) (CPT=74177), 7/13/2024, 5:16 PM.  [facility], CT CHEST+ABDOMEN+PELVIS(ALL CNTRST ONLY)(CPT=71260/69879), 7/14/2023, 3:23 PM.     INDICATIONS: Pelvic pain     TECHNIQUE: Pelvic ultrasound using transabdominal and transvaginal technique.  A transvaginal scan was performed for attempted better delineation of the ovaries and endometrium.     FINDINGS:  UTERUS:   Size is 7.7 x 4.0 x 6.0 cm.       ENDOMETRIUM: Mild nonspecific endometrial fluid.  Endometrium measures 0.34 cm.  MYOMETRIUM: Posterior intramural body fibroid measures 1.8 x 1.5 x 1.7 cm.     OVARIES AND ADNEXA:     RIGHT:   The right ovary measures 1.5 x 1.1 x 1.8 cm.  Normal appearance with no masses.    LEFT:   The left ovary measures 2.1 x 1.2 x 1.4 cm.  Normal appearance with no masses.       CUL-DE-SAC:   No free fluid or mass.  OTHER: Negative.               Impression   CONCLUSION:  1. 1.8 cm posterior intramural uterine fibroid.  2. Mild nonspecific endometrial fluid.            Dictated by (CST): Broderick Horn MD on 8/03/2024 at 1:07 PM      Finalized by (CST): Broderick Horn MD on 8/03/2024 at 1:09 PM     Collected 6/22/2024 11:08 AM       Status: Final result       Dx: Cervical cancer screening    1 Result Note       1 Patient Communication      Component 6/22/24 11:08 AM   Diagnosis: NEGATIVE FOR INTRAEPITHELIAL LESION OR MALIGNANCY.   Specimen Adequacy: Comment   Comment: Satisfactory for evaluation.  Endocervical and/or squamous metaplastic  cells (endocervical component) are present.   Performed By: Comment   Comment: Jerson Bone, Cytotechnologist (ASCP)   . .   Note: Comment   Comment: The Pap smear is a screening test designed to aid in the detection of  premalignant and malignant conditions of the uterine cervix.  It is not a  diagnostic procedure and should not be used as the sole means of detecting  cervical cancer.  Both false-positive and false-negative reports do occur.   Test Methodology: Comment   Comment: This liquid based ThinPrep(R) pap test was screened with the  use of an image guided system.   Clinician provided ICD10: Comment   Comment: Z12.4   Resulting Agency [facility]              Narrative  [facility]formed by: [facility]  Specimen Comment: [id number]  Specimen Comment: Source.............Endocervix  Specimen Comment: No. of containers..01 ThinPrep Vial  Performed at:  [address] [place] [address]  [id number]  : Rachael Calix MD, Phone:  [phone]   Specimen Collected: 06/22/24 11:08 AM Last Resulted: 06/26/24 11:06 PM       A/P  HPI:   Judi White is a 59 year old female who presents for a      hx postmenopausal bleeding.    Ultrasound 8/2024 reviewed, pt has new order from jim bernal ordered.   Pt stated had cherry colored spot on 11/26/24 and none after that.  Pt was in [place] prior to that.  No pain.  The dark cherry colored blood was on her underwear, dark cherry colored.  Same amount of blood as a period on  her pad/underwear only 1 day per pt through  [facility].  Pt counseled that a biopsy of the uterine lining, either by endometrial biopsy or by hysteroscopy/d & c advised.  Ultrasound pending 12/16/24. Pt had hormone tx 2018 and had bleeding then.  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   Last pap smear unknown.  Pt requested pap today and endometrial biopsy Thursday, pt plans premedication with tylenol/motrin.   Hx of morbid obesity and risk of pmb.   Hx of hypertension,  noted today.  Pt had stopped her bp meds after talking to her GI doctor, I counseled pt that this may be a misunderstanding and I spoke with pt's pcp Chata Leigh and she is calling pt to clarify this now so pt can restart her bp meds.   Pt's daughter present and all questions.

## 2024-12-03 ENCOUNTER — TELEPHONE (OUTPATIENT)
Facility: CLINIC | Age: 59
End: 2024-12-03

## 2024-12-03 NOTE — TELEPHONE ENCOUNTER
Received a call from Bharati in PAT    Patient on the phone with Turkmen  through [employer]    Patient was calling to go over bowel prep instructions/diet prior to colonoscopy    I reviewed the bowel prep instructions/diet prior to colonoscopy with the patient in detail     Patient verbalized understanding and has no further questions at this time

## 2024-12-03 NOTE — TELEPHONE ENCOUNTER
Patients daughter called to go over preparation instructions for the procedure the patient has tomorrow with Dr. Mayer. Please call.

## 2024-12-03 NOTE — TELEPHONE ENCOUNTER
Pt's daughter calling    She states her mother was experiencing what they thought was vaginal bleeding    Saw gynecologist today for the bleeding    Daughter wondering if the bleeding is from the rectum. States her mother states her stools have been dark    Recent CBC 11/30/2024 is normal. No anemia    Pt's daughter stating her mother is experiencing pain and burning in her abdomen.    Asking if an EGD can be added to her colonoscopy    Pt was told to stop all medications one week prior to colonoscopy. She has not been taking her omeprazole    I advised the daughter it is ok for her mother to take her omeprazole    I spoke to Dr Mayer. He did an EGD on the pt one year ago    He did not think adding an EGD would be of any benefit at this time    Can discuss tomorrow when he sees the pt

## 2024-12-03 NOTE — TELEPHONE ENCOUNTER
Received msg from Dr Kenney OB-Gyne regarding pt having elevated BP in clinic 160/110, was able to reduce down to 148/92. PT has not been taking her BP meds, states she was told not to take any meds 1 week before colonoscopy. Informed patient. BP at home 140/80. She restarted her BP meds today and will take them tomorrow, colonoscopy is in 2 days. She has stopped the metformin and Ozempic.

## 2024-12-03 NOTE — TELEPHONE ENCOUNTER
I spoke to the patient before her procedure, she said she is experience dysphagia for 1 week and feels like something is stuck in the center of her chest. Has pain and difficulty swallowing.     Given this, the patient needs an URGENT EGD so just does not present with a food impaction.    Will add on EGD to the colonoscopy.    Will do under general anesthesia.

## 2024-12-04 ENCOUNTER — ANESTHESIA (OUTPATIENT)
Dept: ENDOSCOPY | Facility: HOSPITAL | Age: 59
End: 2024-12-04
Payer: MEDICAID

## 2024-12-04 ENCOUNTER — HOSPITAL ENCOUNTER (OUTPATIENT)
Dept: CT IMAGING | Facility: HOSPITAL | Age: 59
Discharge: HOME | End: 2024-12-04
Attending: STUDENT IN AN ORGANIZED HEALTH CARE EDUCATION/TRAINING PROGRAM
Payer: MEDICAID

## 2024-12-04 ENCOUNTER — TELEPHONE (OUTPATIENT)
Dept: OTOLARYNGOLOGY | Facility: CLINIC | Age: 59
End: 2024-12-04

## 2024-12-04 ENCOUNTER — ANESTHESIA EVENT (OUTPATIENT)
Dept: ENDOSCOPY | Facility: HOSPITAL | Age: 59
End: 2024-12-04
Payer: MEDICAID

## 2024-12-04 ENCOUNTER — HOSPITAL ENCOUNTER (OUTPATIENT)
Facility: HOSPITAL | Age: 59
Setting detail: HOSPITAL OUTPATIENT SURGERY
Discharge: HOME | End: 2024-12-04
Attending: STUDENT IN AN ORGANIZED HEALTH CARE EDUCATION/TRAINING PROGRAM | Admitting: STUDENT IN AN ORGANIZED HEALTH CARE EDUCATION/TRAINING PROGRAM
Payer: MEDICAID

## 2024-12-04 ENCOUNTER — TELEPHONE (OUTPATIENT)
Facility: CLINIC | Age: 59
End: 2024-12-04

## 2024-12-04 VITALS
WEIGHT: 293 LBS | HEART RATE: 86 BPM | TEMPERATURE: 98 F | SYSTOLIC BLOOD PRESSURE: 110 MMHG | HEIGHT: 64.96 IN | OXYGEN SATURATION: 92 % | RESPIRATION RATE: 13 BRPM | DIASTOLIC BLOOD PRESSURE: 73 MMHG | BODY MASS INDEX: 48.82 KG/M2

## 2024-12-04 DIAGNOSIS — R09.A2 GLOBUS SENSATION: ICD-10-CM

## 2024-12-04 DIAGNOSIS — R09.A2 GLOBUS SENSATION: Primary | ICD-10-CM

## 2024-12-04 DIAGNOSIS — M79.5 FOREIGN BODY (FB) IN SOFT TISSUE: ICD-10-CM

## 2024-12-04 DIAGNOSIS — K62.5 BRBPR (BRIGHT RED BLOOD PER RECTUM): ICD-10-CM

## 2024-12-04 LAB
CREAT BLD-MCNC: 0.7 MG/DL
EGFRCR SERPLBLD CKD-EPI 2021: 100 ML/MIN/1.73M2 (ref 60–?)
GLUCOSE BLDC GLUCOMTR-MCNC: 79 MG/DL (ref 70–99)
GLUCOSE BLDC GLUCOMTR-MCNC: 91 MG/DL (ref 70–99)

## 2024-12-04 PROCEDURE — 71260 CT THORAX DX C+: CPT | Performed by: STUDENT IN AN ORGANIZED HEALTH CARE EDUCATION/TRAINING PROGRAM

## 2024-12-04 PROCEDURE — 0DB78ZX EXCISION OF STOMACH, PYLORUS, VIA NATURAL OR ARTIFICIAL OPENING ENDOSCOPIC, DIAGNOSTIC: ICD-10-PCS | Performed by: STUDENT IN AN ORGANIZED HEALTH CARE EDUCATION/TRAINING PROGRAM

## 2024-12-04 PROCEDURE — 45385 COLONOSCOPY W/LESION REMOVAL: CPT | Performed by: STUDENT IN AN ORGANIZED HEALTH CARE EDUCATION/TRAINING PROGRAM

## 2024-12-04 PROCEDURE — 0DB18ZX EXCISION OF UPPER ESOPHAGUS, VIA NATURAL OR ARTIFICIAL OPENING ENDOSCOPIC, DIAGNOSTIC: ICD-10-PCS | Performed by: STUDENT IN AN ORGANIZED HEALTH CARE EDUCATION/TRAINING PROGRAM

## 2024-12-04 PROCEDURE — 70491 CT SOFT TISSUE NECK W/DYE: CPT | Performed by: STUDENT IN AN ORGANIZED HEALTH CARE EDUCATION/TRAINING PROGRAM

## 2024-12-04 PROCEDURE — 0DB38ZX EXCISION OF LOWER ESOPHAGUS, VIA NATURAL OR ARTIFICIAL OPENING ENDOSCOPIC, DIAGNOSTIC: ICD-10-PCS | Performed by: STUDENT IN AN ORGANIZED HEALTH CARE EDUCATION/TRAINING PROGRAM

## 2024-12-04 PROCEDURE — 0DBL8ZX EXCISION OF TRANSVERSE COLON, VIA NATURAL OR ARTIFICIAL OPENING ENDOSCOPIC, DIAGNOSTIC: ICD-10-PCS | Performed by: STUDENT IN AN ORGANIZED HEALTH CARE EDUCATION/TRAINING PROGRAM

## 2024-12-04 PROCEDURE — 43239 EGD BIOPSY SINGLE/MULTIPLE: CPT | Performed by: STUDENT IN AN ORGANIZED HEALTH CARE EDUCATION/TRAINING PROGRAM

## 2024-12-04 PROCEDURE — 0DBH8ZX EXCISION OF CECUM, VIA NATURAL OR ARTIFICIAL OPENING ENDOSCOPIC, DIAGNOSTIC: ICD-10-PCS | Performed by: STUDENT IN AN ORGANIZED HEALTH CARE EDUCATION/TRAINING PROGRAM

## 2024-12-04 PROCEDURE — 74177 CT ABD & PELVIS W/CONTRAST: CPT | Performed by: STUDENT IN AN ORGANIZED HEALTH CARE EDUCATION/TRAINING PROGRAM

## 2024-12-04 PROCEDURE — 82565 ASSAY OF CREATININE: CPT

## 2024-12-04 DEVICE — REPLAY HEMOSTASIS CLIP, 11MM SPAN
Type: IMPLANTABLE DEVICE | Site: COLON | Status: FUNCTIONAL
Brand: REPLAY

## 2024-12-04 RX ORDER — ROCURONIUM BROMIDE 10 MG/ML
INJECTION, SOLUTION INTRAVENOUS AS NEEDED
Status: DISCONTINUED | OUTPATIENT
Start: 2024-12-04 | End: 2024-12-04 | Stop reason: SURG

## 2024-12-04 RX ORDER — SODIUM CHLORIDE, SODIUM LACTATE, POTASSIUM CHLORIDE, CALCIUM CHLORIDE 600; 310; 30; 20 MG/100ML; MG/100ML; MG/100ML; MG/100ML
INJECTION, SOLUTION INTRAVENOUS CONTINUOUS
Status: DISCONTINUED | OUTPATIENT
Start: 2024-12-04 | End: 2024-12-04

## 2024-12-04 RX ORDER — EPHEDRINE SULFATE 50 MG/ML
INJECTION INTRAVENOUS AS NEEDED
Status: DISCONTINUED | OUTPATIENT
Start: 2024-12-04 | End: 2024-12-04 | Stop reason: SURG

## 2024-12-04 RX ORDER — NALOXONE HYDROCHLORIDE 0.4 MG/ML
0.08 INJECTION, SOLUTION INTRAMUSCULAR; INTRAVENOUS; SUBCUTANEOUS AS NEEDED
Status: DISCONTINUED | OUTPATIENT
Start: 2024-12-04 | End: 2024-12-04 | Stop reason: HOSPADM

## 2024-12-04 RX ORDER — HYDROMORPHONE HYDROCHLORIDE 1 MG/ML
0.4 INJECTION, SOLUTION INTRAMUSCULAR; INTRAVENOUS; SUBCUTANEOUS EVERY 5 MIN PRN
Status: DISCONTINUED | OUTPATIENT
Start: 2024-12-04 | End: 2024-12-04 | Stop reason: HOSPADM

## 2024-12-04 RX ORDER — ALBUTEROL SULFATE 0.83 MG/ML
2.5 SOLUTION RESPIRATORY (INHALATION) AS NEEDED
Status: DISCONTINUED | OUTPATIENT
Start: 2024-12-04 | End: 2024-12-04 | Stop reason: HOSPADM

## 2024-12-04 RX ORDER — HYDROMORPHONE HYDROCHLORIDE 1 MG/ML
0.2 INJECTION, SOLUTION INTRAMUSCULAR; INTRAVENOUS; SUBCUTANEOUS EVERY 5 MIN PRN
Status: DISCONTINUED | OUTPATIENT
Start: 2024-12-04 | End: 2024-12-04 | Stop reason: HOSPADM

## 2024-12-04 RX ORDER — SODIUM CHLORIDE, SODIUM LACTATE, POTASSIUM CHLORIDE, CALCIUM CHLORIDE 600; 310; 30; 20 MG/100ML; MG/100ML; MG/100ML; MG/100ML
INJECTION, SOLUTION INTRAVENOUS CONTINUOUS
Status: DISCONTINUED | OUTPATIENT
Start: 2024-12-04 | End: 2024-12-04 | Stop reason: HOSPADM

## 2024-12-04 RX ORDER — ONDANSETRON 2 MG/ML
INJECTION INTRAMUSCULAR; INTRAVENOUS AS NEEDED
Status: DISCONTINUED | OUTPATIENT
Start: 2024-12-04 | End: 2024-12-04 | Stop reason: SURG

## 2024-12-04 RX ORDER — LIDOCAINE HYDROCHLORIDE 10 MG/ML
INJECTION, SOLUTION EPIDURAL; INFILTRATION; INTRACAUDAL; PERINEURAL AS NEEDED
Status: DISCONTINUED | OUTPATIENT
Start: 2024-12-04 | End: 2024-12-04 | Stop reason: SURG

## 2024-12-04 RX ORDER — DEXTROSE MONOHYDRATE 25 G/50ML
50 INJECTION, SOLUTION INTRAVENOUS
Status: DISCONTINUED | OUTPATIENT
Start: 2024-12-04 | End: 2024-12-04 | Stop reason: HOSPADM

## 2024-12-04 RX ORDER — NICOTINE POLACRILEX 4 MG
15 LOZENGE BUCCAL
Status: DISCONTINUED | OUTPATIENT
Start: 2024-12-04 | End: 2024-12-04 | Stop reason: HOSPADM

## 2024-12-04 RX ORDER — NICOTINE POLACRILEX 4 MG
30 LOZENGE BUCCAL
Status: DISCONTINUED | OUTPATIENT
Start: 2024-12-04 | End: 2024-12-04 | Stop reason: HOSPADM

## 2024-12-04 RX ORDER — OMEPRAZOLE 40 MG/1
40 CAPSULE, DELAYED RELEASE ORAL
Qty: 180 CAPSULE | Refills: 0 | Status: SHIPPED | OUTPATIENT
Start: 2024-12-04 | End: 2025-03-04

## 2024-12-04 RX ADMIN — ROCURONIUM BROMIDE 50 MG: 10 INJECTION, SOLUTION INTRAVENOUS at 11:27:00

## 2024-12-04 RX ADMIN — SODIUM CHLORIDE, SODIUM LACTATE, POTASSIUM CHLORIDE, CALCIUM CHLORIDE: 600; 310; 30; 20 INJECTION, SOLUTION INTRAVENOUS at 11:16:00

## 2024-12-04 RX ADMIN — LIDOCAINE HYDROCHLORIDE 50 MG: 10 INJECTION, SOLUTION EPIDURAL; INFILTRATION; INTRACAUDAL; PERINEURAL at 11:22:00

## 2024-12-04 RX ADMIN — EPHEDRINE SULFATE 10 MG: 50 INJECTION INTRAVENOUS at 11:54:00

## 2024-12-04 RX ADMIN — ONDANSETRON 4 MG: 2 INJECTION INTRAMUSCULAR; INTRAVENOUS at 11:48:00

## 2024-12-04 NOTE — TELEPHONE ENCOUNTER
I called the patient's daughter Melvina, discussed that there may be a small bone in the hypopharynx that is not easily visualized during an EGD.    I contacted ENT. They will see the patient tomorrow in the office to discuss a plan and likely perform a laryngoscopy.

## 2024-12-04 NOTE — TELEPHONE ENCOUNTER
PPD     Please see the note from Dr Mayer below     EGD was added on today at time of Colonoscopy - PA needed fro EGD

## 2024-12-04 NOTE — TELEPHONE ENCOUNTER
Dr. Cota requested that we schedule pt for an appt tomorrow in the [place] office, fish bone stuck in her throat.  Attempted to call Judi and her son and either the mailbox is not set up or full, unable to leave a message.     Was able to get a hold of patient's daughter, scheduled her for 12/5/24 at 3:50 PM with Dr. Cota

## 2024-12-04 NOTE — DISCHARGE INSTRUCTIONS
Home Care Instructions for Colonoscopy  Diet:  - Resume your regular diet as tolerated unless otherwise instructed.  - Start with light meals to minimize bloating.  - Do not drink alcohol today.    Medication:  - If you have questions about resuming your normal medications, please contact your Primary Care Physician.    Activities:  - Take it easy today. Do not return to work today.  - Do not drive today.  - Do not operate any machinery today (including kitchen equipment).    Colonoscopy:  - You may notice some rectal \"spotting\" (a little blood on the toilet tissue) for a day or two after the exam. This is normal.  - If you experience any rectal bleeding (not spotting), persistent tenderness or sharp severe abdominal pains, oral temperature over 100 degrees Fahrenheit, light-headedness or dizziness, or any other problems, contact your doctor.      **If unable to reach your doctor, please go to the [facility] Emergency Room**    - Your referring physician will receive a full report of your examination.  - If you do not hear from your doctor's office within two weeks of your biopsy, please call them for your results.    You may be able to see your laboratory results in [email or web address] between 4 and 7 business days.  In some cases, your physician may not have viewed the results before they are released to [email or web address].  If you have questions regarding your results contact the physician who ordered the test/exam by phone or via [email or web address] by choosing \"Ask a Medical Question.\"

## 2024-12-04 NOTE — ANESTHESIA POSTPROCEDURE EVALUATION
Patient: Judi White    Procedure Summary       Date: 12/04/24 Room / Location: [facility] ENDOSCOPY 03 / [facility] ENDOSCOPY    Anesthesia Start: 1116 Anesthesia Stop: 1221    Procedures:       COLONOSCOPY      ESOPHAGOGASTRODUODENOSCOPY (EGD) Diagnosis:       BRBPR (bright red blood per rectum)      (esophagitis,Hiatal hernia;colon polyps,hemorrhoids)    Surgeons: Ghassan Mayer MD Anesthesiologist: Serena Gee CRNA    Anesthesia Type: MAC ASA Status: 3            Anesthesia Type: MAC    Vitals Value Taken Time   /85 12/04/24 1221   Temp 97 12/04/24 1221   Pulse 85 12/04/24 1221   Resp 12 12/04/24 1221   SpO2 100 12/04/24 1221       [facility] AN Post Evaluation:   Patient Evaluated in PACU  Patient Participation: complete - patient participated  Level of Consciousness: awake  Pain Score: 0  Pain Management: adequate  Airway Patency:patent  Dental exam unchanged from preop  Yes    Nausea/Vomiting: none  Cardiovascular Status: acceptable  Respiratory Status: acceptable  Postoperative Hydration acceptable      Serena Gee CRNA  12/4/2024 12:21 PM

## 2024-12-04 NOTE — OPERATIVE REPORT
"Occupational Therapy      Patient Name:  Johny Rodriguez   MRN:  [id number]    Rosy Fulton OT  11:50  " ESOPHAGOGASTRODUODENOSCOPY (EGD) & COLONOSCOPY REPORT    Judi White     1965 Age 59 year old   PCP Estelle Multani MD Endoscopist Ghassan Mayer MD       Date of procedure: 24    Procedure: EGD w/ biopsies & Colonoscopy w/ cold snare polypectomy with clip placement, cold forceps polypectomy    Pre-operative diagnosis: globus, dysphagia, abdominal pain, screening, BRBPR    Post-operative diagnosis: see impression    Medications: General    Withdrawal time: 17 minutes    Complications: none    Procedure: Informed consent was obtained from the patient after the risks of the procedure were discussed, including but not limited to bleeding, perforation, aspiration, infection, or possibility of a missed lesion. We discussed the risks/benefits and alternatives to this procedure, as well as the planned sedation. EGD procedure: The patient was placed in the left lateral decubitus position and begun on continuous blood pressure pulse oximetry and EKG monitoring and this was maintained throughout the procedure. Once an adequate level of sedation was obtained a bite block was placed. Then the lubricated tip of the [id number] diagnostic video upper endoscope was inserted and advanced using direct visualization into the posterior pharynx and ultimately into the esophagus with  distal extent of the second portion of the duodenum.     Colonoscopy procedure: Once an adequate level of sedation was obtained a digital rectal exam was completed. Then the lubricated tip of the [id number] diagnostic video colonoscope was inserted and advanced without difficulty to the cecum using the CO2 insufflation technique. The cecum was identified by localizing the trifold, the appendix and the ileocecal valve. A routine second examination of the cecum/ascending colon was performed. Withdrawal was begun with thorough washing and careful examination of the colonic walls and folds. Photodocumentation was obtained.  The bowel prep was good. Views of the colon were good with washing. I then carefully withdrew the instrument from the patient who tolerated the procedure well.     Complications: None    EGD findings:      1. Esophagus: The squamocolumnar junction was noted at 35 cm and appeared regular. The diaphragmatic pinch was noted noted at 38 cm from the incisors. 3 cm hiatal hernia. The esophageal mucosa appeared erythematous and congested in the distal third with multiple small mucosal breaks. There was no evidence of cid's esophagus. No stricture. The gastroscope passed with ease through the esophagus and into the stomach. Biopsies were taken of the distal/proximal esophagus.  2. Stomach: The stomach distended normally. Normal rugal folds were seen. The pylorus was patent. Retroflexion revealed a normal fundus. The gastric mucosa appeared unremarkable. Biopsies were taken with a cold forceps from the antrum, incisura and body for histology.   3. Duodenum: The duodenal mucosa appeared normal in the bulb and 2nd portion of the duodenum.     *The hypopharynx was evaluated BUT views are limited in this region during an EGD and the endotracheal tube in place. I did not appreciated any foreign body but to reiterate views are limited.    *Multiple passes were made through the esophagus, stomach and duodenum, no foreign body visualized.*    EGD Impression:  -Esophagus: LA grade B esophagitis. Hiatal hernia. Biopsies taken from the distal/proximal esophagus.  -Stomach: Unremarkable. Biopsied.  -Duodenum: Unremarkable.  -There was no foreign body visualized on this exam or stricture.   -Views of the hypopharynx are limited during EGD and the foreign body was reportedly swallowed 5 days ago so may be difficult to see with swelling and it could be embedded in the wall.    Colonoscopy findings:    1. 2 polyps noted as follows:      A. 2 mm polyp in the cecum; sessile morphology; cold forceps polypectomy performed, polyp  retrieved.      B. 8 mm polyp in the transverse colon; sessile morphology; cold snare polypectomy performed, polyp retrieved. 3 hemostatic clips placed to prevent future bleeding.    2. Diverticulosis: no large diverticula appreciated.  3. Ileocecal valve appeared normal. Terminal ileum was visualized  4. The colonic mucosa throughout the colon showed normal vascular pattern, without evidence of angioectasias or inflammation.   5. A retroflexed view of the rectum revealed small internal hemorrhoids.  6. STEVEN: normal rectal tone, no masses palpated.     Colonoscopy Impression:  2 polyps removed as outlined above.  Unremarkable terminal ileum.  Small hemorrhoids. No other source of rectal bleeding seen except for internal hemorrhoids.  Colon was otherwise normal with glistening mucosa and intact vascular pattern throughout.    Recommend:  Await pathology.   Start Omeprazole 40mg twice per day and take 30 to 60 minutes before both breakfast and dinner.  CT soft tissue neck, chest, abdomen/pelvis now given complaints of throat discomfort, neck discomfort abdominal discomfort and reported swallowing of a bone 1 week ago. I have ordered these stat for the patient to get done today at a facility that can do them today. Family is aware.    >>>If tissue was obtained and you have not received your pathology results either by phone or letter within 2 weeks, please call our office at [phone].    Specimens: gastric, esophagus (distal/proximal), polyps    Blood loss: <1 ml

## 2024-12-04 NOTE — ANESTHESIA PREPROCEDURE EVALUATION
Anesthesia PreOp Note    HPI:     Judi White is a 59 year old female who presents for preoperative consultation requested by: Ghassan Mayer MD    Date of Surgery: 2024    Procedure(s):  COLONOSCOPY  Indication: BRBPR (bright red blood per rectum)    Relevant Problems   No relevant active problems       NPO:                         History Review:  Patient Active Problem List    Diagnosis Date Noted    Pulmonary nodules 2024    Elevated liver enzymes 2024    Mixed hyperlipidemia 2024    Controlled type 2 diabetes mellitus without complication, without long-term current use of insulin ([facility]) 2024    Abnormal EKG 2024    Gastroesophageal reflux disease 2023    Hepatic steatosis 2023    Sarcoid 2023    Primary hypertension 2023    Class 3 severe obesity due to excess calories with body mass index (BMI) of 50.0 to 59.9 in adult ([facility]) 2023       Past Medical History:    Amenorrhea    Anxiety    Diabetes ([facility])    High blood pressure    High cholesterol    Hypertension    Mixed hyperlipidemia    Obesity    Scoliosis    Shortness of breath    Sleep apnea    needs new CPAP machine    Varicose vein       Past Surgical History:   Procedure Laterality Date    Colonoscopy N/A 2024    ;      1989    Upper gi endoscopy,exam         Prescriptions Prior to Admission[1]  Current Medications and Prescriptions Ordered in Epic[2]    Allergies[3]    History reviewed. No pertinent family history.  Social History     Socioeconomic History    Marital status:    Tobacco Use    Smoking status: Never     Passive exposure: Never    Smokeless tobacco: Never   Vaping Use    Vaping status: Never Used   Substance and Sexual Activity    Alcohol use: Not Currently    Drug use: Never    Sexual activity: Yes   Other Topics Concern    Caffeine Concern No    Exercise No    Seat Belt No    Special Diet No    Stress Concern No    Weight Concern  Yes    Grew up on a farm Yes    History of tanning Yes    Outdoor occupation Yes    Breast feeding No    Reaction to local anesthetic No    Pt has a pacemaker No    Pt has a defibrillator No       Available pre-op labs reviewed.  Lab Results   Component Value Date    WBC 7.0 11/30/2024    RBC 4.69 11/30/2024    HGB 13.1 11/30/2024    HCT 40.9 11/30/2024    MCV 87.2 11/30/2024    MCH 27.9 11/30/2024    MCHC 32.0 11/30/2024    RDW 14.8 11/30/2024    .0 11/30/2024     Lab Results   Component Value Date     11/02/2024    K 4.8 11/02/2024     11/02/2024    CO2 26.0 11/02/2024    BUN 19 11/02/2024    CREATSERUM 0.74 11/02/2024     (H) 11/02/2024    CA 9.8 11/02/2024          Vital Signs:  Body mass index is 51.42 kg/m².   height is 1.65 m (5' 4.96\") and weight is 140 kg (308 lb 10.3 oz) (abnormal).   Vitals:    11/25/24 1501   Weight: (!) 140 kg (308 lb 10.3 oz)   Height: 1.65 m (5' 4.96\")        Anesthesia Evaluation     Patient summary reviewed and Nursing notes reviewed    Airway   Mallampati: II  TM distance: >3 FB  Neck ROM: full  Dental    (+) upper dentures and lower dentures    Pulmonary - normal exam   (+) asthma, shortness of breath, sleep apnea  Cardiovascular - normal exam  (+) hypertension, CAD, CHF, BECERRA    ROS comment: EF 67%    Neuro/Psych    (+)  anxiety/panic attacks,        GI/Hepatic/Renal    (+) GERD, liver disease    Endo/Other    (+) diabetes mellitus type 2  Abdominal  - normal exam                 Anesthesia Plan:   ASA:  3  Plan:   MAC  Informed Consent Plan and Risks Discussed With:  Patient  Discussed plan with:  CRNA      I have informed Judi White and/or legal guardian or family member of the nature of the anesthetic plan, benefits, risks including possible dental damage if relevant, major complications, and any alternative forms of anesthetic management.   All of the patient's questions were answered to the best of my ability. The patient desires the  anesthetic management as planned.  Serena GeeJOSE  2024 9:58 AM  Present on Admission:  **None**           [1]   Medications Prior to Admission   Medication Sig Dispense Refill Last Dose/Taking    omeprazole 20 MG Oral Capsule Delayed Release Take 1 capsule (20 mg total) by mouth every morning before breakfast. (Patient not taking: Reported on 2024)   Taking    Meloxicam (MOBIC) 15 MG Oral Tab Take 1 tablet (15 mg total) by mouth daily for 14 days. (Patient not taking: Reported on 2024) 14 tablet 0 Taking    gabapentin 300 MG Oral Cap Take 1 capsule (300 mg total) by mouth As Directed. Take 1 capsule (300 mg total) nightly for 3 days, if tolerated, add morning dose of 1 capsule (300 mg total) for 3 days and if tolerated add afternoon dose of 1 capsule (300 mg ) .  NOTE: increase doses slowly to minimize side effects. (Patient not taking: Reported on 2024) 90 capsule 0 Taking    valsartan 160 MG Oral Tab Take 1 tablet (160 mg total) by mouth daily. (Patient not taking: Reported on 2024) 90 tablet 3 Taking    atorvastatin 10 MG Oral Tab Take 1 tablet (10 mg total) by mouth nightly. (Patient not taking: Reported on 2024) 90 tablet 3 Taking    Blood Pressure Monitoring Does not apply Kit Check BP twice daily 1 kit 0     semaglutide (OZEMPIC, 0.25 OR 0.5 MG/DOSE,) 2 MG/3ML Subcutaneous Solution Pen-injector Inject 0.25 mg into the skin once a week. (Patient not taking: Reported on 2024) 3 mL 0     Fluocinolone Acetonide Body (DERMA-SMOOTHE/FS BODY) 0.01 % External Oil Apply twice a day to involved areas of itching and rash (Patient not taking: Reported on 2024) 118 mL 0     levocetirizine 5 MG Oral Tab Take 1 tablet (5 mg total) by mouth every evening. (Patient not taking: Reported on 2024) 90 tablet 1     [] fluticasone propionate 50 MCG/ACT Nasal Suspension 1 spray by Nasal route 2 (two) times daily for 15 days. 16 g 0     Insulin Pen Needle (PEN NEEDLES) 32G X  4 MM Does not apply Misc 1 each daily. (Patient not taking: Reported on 2024) 90 each 0     ketoconazole 2 % External Cream Apply to affected area twice daily (Patient not taking: Reported on 2024) 30 g 0     metFORMIN  MG Oral Tablet 24 Hr Take 1 tablet (750 mg total) by mouth daily. (Patient not taking: Reported on 2024) 90 tablet 2     [] diphenhydrAMINE 25 MG Oral Cap Take 2 capsules (50 mg total) by mouth every 6 (six) hours as needed for Itching. 15 capsule 0     PEG 3350-KCl-Na Bicarb-NaCl (TRILYTE) 420 g Oral Recon Soln Take prep as directed by gastro office. May substitute with Trilyte/generic equivalent if needed. (Patient not taking: Reported on 2024) 4000 mL 0     ergocalciferol 1.25 MG (31846 UT) Oral Cap Take 1 capsule (50,000 Units total) by mouth once a week. (Patient not taking: Reported on 2024) 12 capsule 1     Fluocinonide 0.05 % External Solution Apply twice daily  Monday-Friday. Take weekends off. Use on scalp (Patient not taking: Reported on 2024) 60 mL 12     hydrocortisone 2.5 % External Cream Apply rectally twice daily and after each bowel movement (Patient not taking: Reported on 2024) 28 g 0     triamcinolone 0.1 % External Cream Apply topically 2 (two) times daily as needed. (Patient not taking: Reported on 2024) 60 g 3     Potassium Chloride ER 10 MEQ Oral Tab CR Take 1 tab po daily  together  with  furosemide (Patient not taking: Reported on 2024) 90 tablet 0     furosemide 20 MG Oral Tab Take  1 tab po daily (Patient not taking: Reported on 2024) 90 tablet 0    [2]   No current Epic-ordered facility-administered medications on file.     No current Epic-ordered outpatient medications on file.   [3]   Allergies  Allergen Reactions    Trulicity [Dulaglutide] NAUSEA ONLY    Lactose ITCHING

## 2024-12-04 NOTE — ANESTHESIA PROCEDURE NOTES
Airway  Date/Time: 12/4/2024 11:24 AM    General Information and Staff    Patient location during procedure: endo  Resident/CRNA: Serena Gee CRNA  Performed: CRNA   Performed by: Serena Gee CRNA  Authorized by: Serena Gee CRNA      Indications and Patient Condition  Indications for airway management: airway protection and anesthesia  Spontaneous Ventilation: absent  Sedation level: deep  Preoxygenated: yes  Patient position: sniffing  MILS maintained throughout  Mask difficulty assessment: 0 - not attempted    Final Airway Details  Final airway type: endotracheal airway      Successful airway: ETT     Successful intubation technique: Video laryngoscopy  Blade: GlideScope  Blade size: #4  ETT size (mm): 7.5    Cormack-Lehane Classification: grade I - full view of glottis  Measured from: lips  ETT to lips (cm): 22  Number of attempts at approach: 1

## 2024-12-04 NOTE — H&P
History & Physical Examination    Patient Name: Judi White  MRN: [id number]  CSN: [id number]  YOB: 1965    Diagnosis: dysphagia, screening for colon cancer. EGD and colonoscopy today.    Prescriptions Prior to Admission[1]  No current facility-administered medications for this encounter.       Allergies: Allergies[2]    Past Medical History:    Amenorrhea    Anxiety    Diabetes (HCC)    High blood pressure    High cholesterol    Hypertension    Mixed hyperlipidemia    Obesity    Scoliosis    Shortness of breath    Sleep apnea    needs new CPAP machine    Varicose vein     Past Surgical History:   Procedure Laterality Date    Colonoscopy N/A 2024    ;      1989    Upper gi endoscopy,exam       History reviewed. No pertinent family history.  Social History     Tobacco Use    Smoking status: Never     Passive exposure: Never    Smokeless tobacco: Never   Substance Use Topics    Alcohol use: Not Currently       SYSTEM Check if Review is Normal Check if Physical Exam is Normal If not normal, please explain:   HEENT [X ] [ X]    NECK  [X ] [ X]    HEART [X ] [ X]    LUNGS [X ] [ X]    ABDOMEN [X ] [ X]    EXTREMITIES [X ] [ X]    OTHER        I have discussed the risks and benefits and alternatives of the procedure with the patient/family.  They understand and agree to proceed with plan of care.   I have reviewed the History and Physical done within the last 30 days.  Any changes noted above.    Ghassan Mayer MD  [facility]                   [1]   Medications Prior to Admission   Medication Sig Dispense Refill Last Dose/Taking    omeprazole 20 MG Oral Capsule Delayed Release Take 1 capsule (20 mg total) by mouth every morning before breakfast. (Patient not taking: Reported on 2024)   Taking    Meloxicam (MOBIC) 15 MG Oral Tab Take 1 tablet (15 mg total) by mouth daily for 14 days. (Patient not taking: Reported on 2024) 14 tablet 0 Taking     gabapentin 300 MG Oral Cap Take 1 capsule (300 mg total) by mouth As Directed. Take 1 capsule (300 mg total) nightly for 3 days, if tolerated, add morning dose of 1 capsule (300 mg total) for 3 days and if tolerated add afternoon dose of 1 capsule (300 mg ) .  NOTE: increase doses slowly to minimize side effects. (Patient not taking: Reported on 2024) 90 capsule 0 Taking    valsartan 160 MG Oral Tab Take 1 tablet (160 mg total) by mouth daily. (Patient not taking: Reported on 2024) 90 tablet 3 Taking    atorvastatin 10 MG Oral Tab Take 1 tablet (10 mg total) by mouth nightly. (Patient not taking: Reported on 2024) 90 tablet 3 Taking    Blood Pressure Monitoring Does not apply Kit Check BP twice daily 1 kit 0     semaglutide (OZEMPIC, 0.25 OR 0.5 MG/DOSE,) 2 MG/3ML Subcutaneous Solution Pen-injector Inject 0.25 mg into the skin once a week. (Patient not taking: Reported on 2024) 3 mL 0     Fluocinolone Acetonide Body (DERMA-SMOOTHE/FS BODY) 0.01 % External Oil Apply twice a day to involved areas of itching and rash (Patient not taking: Reported on 2024) 118 mL 0     levocetirizine 5 MG Oral Tab Take 1 tablet (5 mg total) by mouth every evening. (Patient not taking: Reported on 2024) 90 tablet 1     [] fluticasone propionate 50 MCG/ACT Nasal Suspension 1 spray by Nasal route 2 (two) times daily for 15 days. 16 g 0     Insulin Pen Needle (PEN NEEDLES) 32G X 4 MM Does not apply Misc 1 each daily. (Patient not taking: Reported on 2024) 90 each 0     ketoconazole 2 % External Cream Apply to affected area twice daily (Patient not taking: Reported on 2024) 30 g 0     metFORMIN  MG Oral Tablet 24 Hr Take 1 tablet (750 mg total) by mouth daily. (Patient not taking: Reported on 2024) 90 tablet 2     [] diphenhydrAMINE 25 MG Oral Cap Take 2 capsules (50 mg total) by mouth every 6 (six) hours as needed for Itching. 15 capsule 0     PEG 3350-KCl-Na Bicarb-NaCl  (TRILYTE) 420 g Oral Recon Soln Take prep as directed by gastro office. May substitute with Trilyte/generic equivalent if needed. (Patient not taking: Reported on 12/2/2024) 4000 mL 0     ergocalciferol 1.25 MG (04848 UT) Oral Cap Take 1 capsule (50,000 Units total) by mouth once a week. (Patient not taking: Reported on 12/2/2024) 12 capsule 1     Fluocinonide 0.05 % External Solution Apply twice daily  Monday-Friday. Take weekends off. Use on scalp (Patient not taking: Reported on 12/2/2024) 60 mL 12     hydrocortisone 2.5 % External Cream Apply rectally twice daily and after each bowel movement (Patient not taking: Reported on 12/2/2024) 28 g 0     triamcinolone 0.1 % External Cream Apply topically 2 (two) times daily as needed. (Patient not taking: Reported on 12/2/2024) 60 g 3     Potassium Chloride ER 10 MEQ Oral Tab CR Take 1 tab po daily  together  with  furosemide (Patient not taking: Reported on 12/2/2024) 90 tablet 0     furosemide 20 MG Oral Tab Take  1 tab po daily (Patient not taking: Reported on 12/2/2024) 90 tablet 0    [2]   Allergies  Allergen Reactions    Trulicity [Dulaglutide] NAUSEA ONLY    Lactose ITCHING

## 2024-12-05 ENCOUNTER — OFFICE VISIT (OUTPATIENT)
Dept: OBGYN CLINIC | Facility: CLINIC | Age: 59
End: 2024-12-05
Payer: MEDICAID

## 2024-12-05 ENCOUNTER — LAB ENCOUNTER (OUTPATIENT)
Dept: LAB | Facility: HOSPITAL | Age: 59
End: 2024-12-05
Attending: OBSTETRICS & GYNECOLOGY
Payer: MEDICAID

## 2024-12-05 ENCOUNTER — OFFICE VISIT (OUTPATIENT)
Dept: OTOLARYNGOLOGY | Facility: CLINIC | Age: 59
End: 2024-12-05
Payer: MEDICAID

## 2024-12-05 VITALS — BODY MASS INDEX: 53 KG/M2 | WEIGHT: 293 LBS

## 2024-12-05 VITALS — SYSTOLIC BLOOD PRESSURE: 140 MMHG | DIASTOLIC BLOOD PRESSURE: 87 MMHG

## 2024-12-05 DIAGNOSIS — K21.00 GASTROESOPHAGEAL REFLUX DISEASE WITH ESOPHAGITIS, UNSPECIFIED WHETHER HEMORRHAGE: ICD-10-CM

## 2024-12-05 DIAGNOSIS — Z01.818 PRE-PROCEDURAL EXAMINATION: ICD-10-CM

## 2024-12-05 DIAGNOSIS — R07.0 THROAT PAIN: ICD-10-CM

## 2024-12-05 DIAGNOSIS — T17.208A FOREIGN BODY IN PHARYNX, INITIAL ENCOUNTER: ICD-10-CM

## 2024-12-05 DIAGNOSIS — N95.0 POSTMENOPAUSAL BLEEDING: ICD-10-CM

## 2024-12-05 DIAGNOSIS — Z32.01 PREGNANCY TEST POSITIVE (HCC): Primary | ICD-10-CM

## 2024-12-05 DIAGNOSIS — R04.1 HEMORRHAGE OF VOCAL CORD: Primary | ICD-10-CM

## 2024-12-05 DIAGNOSIS — Z32.01 PREGNANCY TEST POSITIVE (HCC): ICD-10-CM

## 2024-12-05 LAB
AFP-TM SERPL-MCNC: <2.2 NG/ML
B-HCG SERPL-ACNC: 3.6 MIU/ML
CONTROL LINE PRESENT WITH A CLEAR BACKGROUND (YES/NO): YES YES/NO
DHEA-S SERPL-MCNC: 35.5 UG/DL
ESTRADIOL SERPL-MCNC: 31.2 PG/ML
KIT LOT #: NORMAL NUMERIC
LDH SERPL L TO P-CCNC: 288 U/L

## 2024-12-05 PROCEDURE — 84410 TESTOSTERONE BIOAVAILABLE: CPT

## 2024-12-05 PROCEDURE — 83615 LACTATE (LD) (LDH) ENZYME: CPT

## 2024-12-05 PROCEDURE — 83520 IMMUNOASSAY QUANT NOS NONAB: CPT

## 2024-12-05 PROCEDURE — 36415 COLL VENOUS BLD VENIPUNCTURE: CPT

## 2024-12-05 PROCEDURE — 84702 CHORIONIC GONADOTROPIN TEST: CPT

## 2024-12-05 PROCEDURE — 82157 ASSAY OF ANDROSTENEDIONE: CPT

## 2024-12-05 PROCEDURE — 82105 ALPHA-FETOPROTEIN SERUM: CPT

## 2024-12-05 PROCEDURE — 82627 DEHYDROEPIANDROSTERONE: CPT

## 2024-12-05 PROCEDURE — 82670 ASSAY OF TOTAL ESTRADIOL: CPT

## 2024-12-05 RX ORDER — DIPHENHYDRAMINE HCL 12.5MG/5ML
LIQUID (ML) ORAL
Qty: 500 ML | Refills: 0 | Status: SHIPPED | OUTPATIENT
Start: 2024-12-05

## 2024-12-05 RX ORDER — HYDROCORTISONE 20 MG/1
20 TABLET ORAL DAILY
Qty: 8 TABLET | Refills: 0 | Status: SHIPPED | OUTPATIENT
Start: 2024-12-05

## 2024-12-05 RX ORDER — METHYLPREDNISOLONE 4 MG/1
TABLET ORAL
Qty: 21 TABLET | Refills: 0 | Status: SHIPPED | OUTPATIENT
Start: 2024-12-05

## 2024-12-05 NOTE — PROGRESS NOTES
Judi White is a 59 year old female.   Chief Complaint   Patient presents with    Throat Problem     Patient is here for chicken bone stuck on the throat Ct scan was done results on the system.      HPI:   59-year-old who thinks she may have had a choking episode with a chicken bone or fishbone last week.  Had an upper endoscopy and colonoscopy yesterday with evidence of esophagitis and a hiatal hernia.  GI physician ordered a CT scan of her neck that demonstrated a small hypopharyngeal linear density.  She is complaining of some difficulty swallowing for the last several days although worsened since the procedure    Current Outpatient Medications   Medication Sig Dispense Refill    methylPREDNISolone 4 MG Oral Tablet Therapy Pack Take by oral route. 21 tablet 0    hydrocortisone 20 MG Oral Tab Take 1 tablet (20 mg total) by mouth daily. Crush and mix tabs into Benadryl elixir 8 tablet 0    diphenhydrAMINE 12.5 MG/5ML Oral Elixir Dispense 500cc Benadryl. To be mixed with the 8 tabs of 20mg Hydrocortisone. Swish and can swallow 5cc of the mixture every 6 hours x 10 days. 500 mL 0    Omeprazole 40 MG Oral Capsule Delayed Release Take 1 capsule (40 mg total) by mouth 2 (two) times daily before meals. 180 capsule 0    Blood Pressure Monitoring Does not apply Kit Check BP twice daily 1 kit 0    semaglutide (OZEMPIC, 0.25 OR 0.5 MG/DOSE,) 2 MG/3ML Subcutaneous Solution Pen-injector Inject 0.25 mg into the skin once a week. 3 mL 0    gabapentin 300 MG Oral Cap Take 1 capsule (300 mg total) by mouth As Directed. Take 1 capsule (300 mg total) nightly for 3 days, if tolerated, add morning dose of 1 capsule (300 mg total) for 3 days and if tolerated add afternoon dose of 1 capsule (300 mg ) .  NOTE: increase doses slowly to minimize side effects. 90 capsule 0    valsartan 160 MG Oral Tab Take 1 tablet (160 mg total) by mouth daily. 90 tablet 3    metFORMIN  MG Oral Tablet 24 Hr Take 1 tablet (750 mg total) by mouth  daily. 90 tablet 2    atorvastatin 10 MG Oral Tab Take 1 tablet (10 mg total) by mouth nightly. 90 tablet 3    furosemide 20 MG Oral Tab Take  1 tab po daily 90 tablet 0    Insulin Pen Needle (PEN NEEDLES) 32G X 4 MM Does not apply Misc 1 each daily. (Patient not taking: Reported on 12/2/2024) 90 each 0    ketoconazole 2 % External Cream Apply to affected area twice daily (Patient not taking: Reported on 12/2/2024) 30 g 0      Past Medical History:    Amenorrhea    Anxiety    Diabetes (HCC)    High blood pressure    High cholesterol    Hypertension    Mixed hyperlipidemia    Obesity    Scoliosis    Shortness of breath    Sleep apnea    needs new CPAP machine    Varicose vein      Social History:  Social History     Socioeconomic History    Marital status:    Tobacco Use    Smoking status: Never     Passive exposure: Never    Smokeless tobacco: Never   Vaping Use    Vaping status: Never Used   Substance and Sexual Activity    Alcohol use: Not Currently    Drug use: Never    Sexual activity: Yes   Other Topics Concern    Caffeine Concern No    Exercise No    Seat Belt No    Special Diet No    Stress Concern No    Weight Concern Yes    Grew up on a farm Yes    History of tanning Yes    Outdoor occupation Yes    Breast feeding No    Reaction to local anesthetic No    Pt has a pacemaker No    Pt has a defibrillator No     Social Drivers of Health     Food Insecurity: Low Risk  (4/20/2024)    Received from [facility]    Food Insecurity     Have there been times that your food ran out, and you didn't have money to get more?: No     Are there times that you worry that this might happen?: No   Transportation Needs: Low Risk  (4/20/2024)    Received from [facility]    Transportation Needs     Do you have trouble getting transportation to medical appointments?: No      Past Surgical History:   Procedure Laterality Date    Colonoscopy N/A 12/04/2024    ; colon  polyps,hemorrhoids    Colonoscopy N/A 2024    Procedure: COLONOSCOPY;  Surgeon: Ghassan Mayer MD;  Location: [facility] ENDOSCOPY    Egd N/A 2024    ; esophagitis,Hiatal hernia      1989    Upper gi endoscopy,exam           EXAM:   Wt (!) 307 lb 1.6 oz (139.3 kg)   BMI 52.71 kg/m²     System Details   Skin Inspection - Normal.   Constitutional Overall appearance - Normal.   Head/Face Symmetric, TMJ tenderness not present    Eyes EOMI, PERRL   Right ear:  Canal clear, TM intact, no BERLIN   Left ear:  Canal clear, TM intact, no BERLIN   Nose: Septum midline, inferior turbinates not enlarged, nasal valves without collapse    Oral cavity/Oropharynx: No lesions or masses on inspection or palpation, tonsils symmetric    Neck: Soft without LAD, thyroid not enlarged  Voice clear/ no stridor   Other:      SCOPES AND PROCEDURES:       Flexible Laryngoscopy Procedure Note (22606)    Due to inability for adequate examination of the larynx and need for magnification to perform the examination, endoscopy was performed.  Risks and benefits were discussed with patient/family and they have given verbal consent to proceed.    Pre-operative Diagnosis:   1. Hemorrhage of vocal cord    2. Throat pain    3. Gastroesophageal reflux disease with esophagitis, unspecified whether hemorrhage    4. Foreign body in pharynx, initial encounter        Post-operative Diagnosis: Same    Procedure: Diagnostic flexible fiberoptic laryngoscopy    Anesthesia: Topical anesthetic Merino     Surgeon Jarad Cota MD    EBL: 0cc    Procedure Detail & Findings:     After placement of topical anesthetic intranasally the flexible laryngoscope was inserted into the nare and driven through the nasal cavity with no significant abnormal findings noted in the nasal cavities or nasopharynx. The oropharynx, hypopharynx and larynx were subsequently examined and the following findings were noted:        Base of Tongue: Normal        Valeculla:  Normal        Arytenoids: Normal/Erythemous: Erythmous        Introitus of the esophagus: Normal        Epiglottis: Normal        False vocal cords: Normal        True vocal cords: Hemorrhage of both vocal cords worse in the left side.        Subglottic space: Normal        Mobility of True vocal cords: Normal  Does not have any postcricoid edema or erythema    Condition: Stable    Complications: Patient tolerated the procedure well with no immediate complication.    Jarda Cota MD    AUDIOGRAM AND IMAGING:     CT neck  CONCLUSION:      1. A 5 mm linear area of bone density involving the right paramedian aspect of the hypopharynx just above the level of the false vocal cords, which may reflect an ingested foreign body.  Consider correlation with direct inspection.    2. No neck mass or cervical lymphadenopathy.   3. Mild prominence of the palatine tonsils with a punctate left palatine tonsillolith; these findings are likely chronic.   4. Lesser incidental findings described above.     IMPRESSION:   1. Hemorrhage of vocal cord    2. Throat pain    3. Gastroesophageal reflux disease with esophagitis, unspecified whether hemorrhage    4. Foreign body in pharynx, initial encounter  - CT SOFT TISSUE OF NECK(CONTRAST ONLY) (CPT=70491); Future       Recommendations:  -CT reviewed, small linear area of bone density of the right postcricoid area.  Laryngoscopy also with hemorrhage of the vocal cords worse on the left side.  Possibly from the intubation yesterday or from her coughing episodes from the possible foreign body  -Somewhat difficult situation due to her body habitus and risks of anesthesia and difficulty with endoscopy/ equipment needs  -Noted possible concern by OB/GYN today for pregnancy?  -Will trial on a steroid gargle and oral steroid for edema of her vocal cords and postcricoid region where this foreign body may be  -Will repeat the CT scan next week as this could represent artifact   -Will need to take  into consideration her body habitus, risk of anesthesia if concern for foreign body persist on imaging and symptoms persist despite treatment and equipment availability if this is feasible here or she may need a university opinion    The patient indicates understanding of these issues and agrees to the plan.  Consult from Dr Mayer regarding throat FB    Jarad Cota MD  12/5/2024  4:21 PM

## 2024-12-05 NOTE — PROGRESS NOTES
HPI:   Judi White is a 59 year old female who presents for a hx of postmenopausal bleeding.   Pt counseled and pt requested endometrial biopsy.     Wt Readings from Last 6 Encounters:   11/25/24 (!) 308 lb 10.3 oz (140 kg)   12/02/24 (!) 311 lb (141.1 kg)   11/26/24 (!) 311 lb (141.1 kg)   11/21/24 (!) 311 lb (141.1 kg)   11/02/24 (!) 311 lb (141.1 kg)   10/24/24 (!) 310 lb (140.6 kg)     There is no height or weight on file to calculate BMI.    Cholesterol, Total (mg/dL)   Date Value   11/02/2024 200 (H)   04/18/2024 196   06/28/2023 208 (H)     HDL Cholesterol (mg/dL)   Date Value   11/02/2024 52   04/18/2024 35 (L)   06/28/2023 37 (L)     LDL Cholesterol (mg/dL)   Date Value   11/02/2024 133 (H)   04/18/2024 136 (H)   06/28/2023 136 (H)     AST (U/L)   Date Value   11/30/2024 28   11/02/2024 58 (H)   08/23/2024 53 (H)     ALT (U/L)   Date Value   11/30/2024 42   11/02/2024 108 (H)   08/23/2024 75 (H)        Current Outpatient Medications   Medication Sig Dispense Refill    Omeprazole 40 MG Oral Capsule Delayed Release Take 1 capsule (40 mg total) by mouth 2 (two) times daily before meals. 180 capsule 0    Blood Pressure Monitoring Does not apply Kit Check BP twice daily 1 kit 0    semaglutide (OZEMPIC, 0.25 OR 0.5 MG/DOSE,) 2 MG/3ML Subcutaneous Solution Pen-injector Inject 0.25 mg into the skin once a week. 3 mL 0    gabapentin 300 MG Oral Cap Take 1 capsule (300 mg total) by mouth As Directed. Take 1 capsule (300 mg total) nightly for 3 days, if tolerated, add morning dose of 1 capsule (300 mg total) for 3 days and if tolerated add afternoon dose of 1 capsule (300 mg ) .  NOTE: increase doses slowly to minimize side effects. 90 capsule 0    valsartan 160 MG Oral Tab Take 1 tablet (160 mg total) by mouth daily. 90 tablet 3    metFORMIN  MG Oral Tablet 24 Hr Take 1 tablet (750 mg total) by mouth daily. 90 tablet 2    atorvastatin 10 MG Oral Tab Take 1 tablet (10 mg total) by mouth nightly. 90 tablet  3    furosemide 20 MG Oral Tab Take  1 tab po daily 90 tablet 0    Insulin Pen Needle (PEN NEEDLES) 32G X 4 MM Does not apply Misc 1 each daily. (Patient not taking: Reported on 2024) 90 each 0    ketoconazole 2 % External Cream Apply to affected area twice daily (Patient not taking: Reported on 2024) 30 g 0      Past Medical History:    Amenorrhea    Anxiety    Diabetes (HCC)    High blood pressure    High cholesterol    Hypertension    Mixed hyperlipidemia    Obesity    Scoliosis    Shortness of breath    Sleep apnea    needs new CPAP machine    Varicose vein      Past Surgical History:   Procedure Laterality Date    Colonoscopy N/A 2024    ; colon polyps,hemorrhoids    Colonoscopy N/A 2024    Procedure: COLONOSCOPY;  Surgeon: Ghassan Mayer MD;  Location: [facility] ENDOSCOPY    Egd N/A 2024    ; esophagitis,Hiatal hernia      1989    Upper gi endoscopy,exam        No family history on file.   Social History:   Social History     Socioeconomic History    Marital status:    Tobacco Use    Smoking status: Never     Passive exposure: Never    Smokeless tobacco: Never   Vaping Use    Vaping status: Never Used   Substance and Sexual Activity    Alcohol use: Not Currently    Drug use: Never    Sexual activity: Yes   Other Topics Concern    Caffeine Concern No    Exercise No    Seat Belt No    Special Diet No    Stress Concern No    Weight Concern Yes    Grew up on a farm Yes    History of tanning Yes    Outdoor occupation Yes    Breast feeding No    Reaction to local anesthetic No    Pt has a pacemaker No    Pt has a defibrillator No     Social Drivers of Health     Food Insecurity: Low Risk  (2024)    Received from [facility]    Food Insecurity     Have there been times that your food ran out, and you didn't have money to get more?: No     Are there times that you worry that this might happen?: No   Transportation Needs: Low  Risk  (4/20/2024)    Received from [facility]    Transportation Needs     Do you have trouble getting transportation to medical appointments?: No            REVIEW OF SYSTEMS:   GENERAL: feels well otherwise  SKIN: denies any unusual skin lesions  EYES:denies blurred vision or double vision  HEENT: denies nasal congestion, sinus pain or ST  LUNGS: denies shortness of breath with exertion  CARDIOVASCULAR: denies chest pain on exertion  GI: denies abdominal pain,denies heartburn  : denies dysuria, vaginal discharge or itching,periods regular   MUSCULOSKELETAL: denies back pain  NEURO: denies headaches  PSYCHE: denies depression or anxiety  HEMATOLOGIC: denies hx of anemia  ENDOCRINE: denies thyroid history  ALL/ASTHMA: denies hx of allergy or asthma    EXAM:   /87 (BP Location: Right arm, Patient Position: Sitting, Cuff Size: large)   There is no height or weight on file to calculate BMI.   GENERAL: well developed, well nourished,in no apparent distress  SKIN: no rashes,no suspicious lesions  HEENT: atraumatic, normocephalic  EYES:normal in appearance  EXTREMITIES: no cyanosis, clubbing or edema  NEURO: Oriented times three    Positive ucg X 3 noted in office. Pt and daugher counseled on prob positive bhcg and possible etiologies and labs ordered, pt going to lab now.  Utlrasound already scheduled for 12/16/24.   Ordered bhcg quant, afp, ldh, e2, inhibin, testosterone, androstenedione, dhea, amh    ASSESSMENT AND PLAN:   Judi White is a 59 year old female who presents for a hx postmenopausal bleeding,  noted now to have faint positive ucg X 3 verified by 3 office staff.   Counseled pt and daughter and will check lab work now for faint positive ucg in a post menopausal female.  Endometrial biopsy deferred due to pos ucg at this time. Pt to reschedule 2 weeks.  Await labs.

## 2024-12-06 ENCOUNTER — MOBILE ENCOUNTER (OUTPATIENT)
Dept: OTOLARYNGOLOGY | Facility: CLINIC | Age: 59
End: 2024-12-06

## 2024-12-06 ENCOUNTER — TELEPHONE (OUTPATIENT)
Dept: OTOLARYNGOLOGY | Facility: CLINIC | Age: 59
End: 2024-12-06

## 2024-12-06 NOTE — TELEPHONE ENCOUNTER
Message sent to Dr. Cota regarding unclear Hydrocortisone/Benadryl elixir instructions. Pharmacy would like a new script with better instructions.    Message left for pharmacist, Dr. Cota wants all 8 of the Hydrocortisone tablets to be crushed and put into the 500mls of the Benadryl elixir, she's to swish/swallow 5 mls of the mixture every 6 hrs for 10 days.

## 2024-12-06 NOTE — TELEPHONE ENCOUNTER
Message to prescriber  HYDROCORTISONE 20 MG TABLETS  CRUSH AND MIX 1 TABLET INTO BENADRYL ELIXIR ONE DAILY    This medications instructions are unclear  and accommodating script is contradictory. Please call for annotation or send a new prescription with clarification . Thank you.

## 2024-12-09 ENCOUNTER — TELEPHONE (OUTPATIENT)
Facility: CLINIC | Age: 59
End: 2024-12-09

## 2024-12-09 DIAGNOSIS — A04.8 H. PYLORI INFECTION: Primary | ICD-10-CM

## 2024-12-09 LAB
INHIBIN B: <7 PG/ML
MULLERIAN AMH: <0.015 NG/ML

## 2024-12-09 RX ORDER — AMOXICILLIN 500 MG/1
1000 TABLET, FILM COATED ORAL 2 TIMES DAILY
Qty: 56 TABLET | Refills: 0 | Status: SHIPPED | OUTPATIENT
Start: 2024-12-09 | End: 2024-12-23

## 2024-12-09 RX ORDER — CLARITHROMYCIN 500 MG/1
500 TABLET ORAL 2 TIMES DAILY
Qty: 28 TABLET | Refills: 0 | Status: SHIPPED | OUTPATIENT
Start: 2024-12-09 | End: 2024-12-23

## 2024-12-09 NOTE — TELEPHONE ENCOUNTER
EGD biopsies reviewed w/patient. H.pylori gastritis identified. The natural history of H.pylori was reviewed with the patient, as well as possible complications from H.pylori including stomach cancer, gastritis, dyspepsia, anemia and ulcers. We discussed treatment options and rationale for treatment, as well as testing for eradication. The patient understands the risks/benefits/side-effects of treatment and wants to proceed. The patient understands only 80-95% of patients have eradication to initial course of abx, and therefore may need another course of abx if fails first treatment. There may be side-effects during treatment and patient should call if any problems.  I have instructed patient to check H.pylori antigen in 8-10 weeks to ensure eradication (order placed).    Will start w/ triple therapy (Omeprazole BID+ Amoxicillin BID+ Clarithromycin BID) x 14 days.     Instructed to hold atorvastatin    Repeat h. Pylori breath test in 10 weeks.

## 2024-12-09 NOTE — TELEPHONE ENCOUNTER
Final Diagnosis:      A. Gastric biopsy:  Fragments of gastric mucosa with moderate chronic active H. Pylori-associated gastritis.  Diff-Quik stain (with appropriate control reactivity) is positive for H. pylori microorganisms.

## 2024-12-09 NOTE — TELEPHONE ENCOUNTER
Patient's daughter called to speak to RN or Dr. Mayer regarding positive H Pylori results  Please call.

## 2024-12-10 LAB — ANDROSTENEDIONE: 24 NG/DL

## 2024-12-12 LAB
SEX HORM BIND GLOB: 49.4 NMOL/L
TESTOST % FREE+WEAK BND: 9 %
TESTOST FREE+WEAK BND: 1.5 NG/DL
TESTOSTERONE TOT /MS: 16.3 NG/DL

## 2024-12-16 ENCOUNTER — HOSPITAL ENCOUNTER (OUTPATIENT)
Dept: ULTRASOUND IMAGING | Age: 59
Discharge: HOME | End: 2024-12-16
Attending: NURSE PRACTITIONER
Payer: MEDICAID

## 2024-12-16 DIAGNOSIS — N95.0 POST-MENOPAUSAL BLEEDING: ICD-10-CM

## 2024-12-16 PROCEDURE — 76830 TRANSVAGINAL US NON-OB: CPT | Performed by: NURSE PRACTITIONER

## 2024-12-16 PROCEDURE — 76856 US EXAM PELVIC COMPLETE: CPT | Performed by: NURSE PRACTITIONER

## 2024-12-19 ENCOUNTER — NURSE TRIAGE (OUTPATIENT)
Dept: INTERNAL MEDICINE CLINIC | Facility: CLINIC | Age: 59
End: 2024-12-19

## 2024-12-19 ENCOUNTER — OFFICE VISIT (OUTPATIENT)
Facility: CLINIC | Age: 59
End: 2024-12-19
Payer: MEDICAID

## 2024-12-19 VITALS
BODY MASS INDEX: 50.02 KG/M2 | HEART RATE: 100 BPM | SYSTOLIC BLOOD PRESSURE: 132 MMHG | DIASTOLIC BLOOD PRESSURE: 88 MMHG | WEIGHT: 293 LBS | HEIGHT: 64 IN

## 2024-12-19 DIAGNOSIS — N95.0 POSTMENOPAUSAL BLEEDING: Primary | ICD-10-CM

## 2024-12-19 PROCEDURE — 99212 OFFICE O/P EST SF 10 MIN: CPT

## 2024-12-19 NOTE — TELEPHONE ENCOUNTER
Action Requested: Summary for Provider     []  Critical Lab, Recommendations Needed  [] Need Additional Advice  []   FYI    []   Need Orders  [] Need Medications Sent to Pharmacy  []  Other     SUMMARY: Daughter Melvina(on HIPAA) indicated that patient has been having leg cramps in both of her legs. 3-4 days ago had the leg cramp in the left leg and 2 days ago it was in the right leg. No swelling. Pain is strong. Has happened when she is sleeping and wakes her up. No other symptoms. Tried to offer an appointment today at 4pm in [place]. She tried to contact patient but she was not picking up. Stated that she will call back to schedule when she talks with patient.   Reason for call: Leg Pain (Bilateral leg cramps)  Onset: Dec 15, 2024  Reason for Disposition   MODERATE pain (e.g., interferes with normal activities, limping) and present > 3 days    Protocols used: Leg Pain-A-[place]

## 2024-12-19 NOTE — PROGRESS NOTES
Judi White is a 59 year old female  No LMP recorded. (Menstrual status: Menopause).   Chief Complaint   Patient presents with    Follow - Up     Discuss ultrasound results, 2024   .  On 24 -had visit with Chata VERAS for post menopausal bleeding. Pt reports it soaked through her undergarments. Denies pelvic pain or vaginal discharge    - a follow up ultrasound was done.   FINDINGS:            UTERUS:  9.7 x 2.8 x 5.4 cm     Endometrium Thickness:  0.6 cm     There is evidence of a uterine fibroid along the posterior body at the lower uterine segment measuring 2.2 x 1.4 x 1.8 cm.    RIGHT OVARY:  1.8 x 1.4 x 1.8 cm     The right ovary appears normal in size, shape, and echogenicity. No significant masses are identified.   LEFT OVARY:  2.4 x 2.1 x 1.5 cm     The left ovary appears normal in size, shape, and echogenicity. No significant masses are identified.   CUL-DE-SAC:  Normal.  No fluid or mass.    OTHER:  Negative.       24- seen Dr. Finch for follow up. He planned on doing an EMB, had three positive pregnancy in office. EMB not done, HCG was ordered, result is negative.     Came into office today to discuss her pelvic US results. She was angry because she feels as though her problems were not being addressed. I reviewed her labs and pelvic US results.   For the post menopausal bleeding I recommended an EMB to r/o endometrial cancer. She wanted to know the next steps if the pathology came back positive. I explained to her she would have a consult with one of the doctors. She thought I was an MD and I explained to her my role as an APN. She declined the EMB I offered her and wants to see a \"real professional\". She refuses to continue her care with me and wants to see a doctor.     OBSTETRICS HISTORY:  OB History    Para Term  AB Living   3 3           SAB IAB Ectopic Multiple Live Births                  # Outcome Date GA Lbr José/2nd Weight Sex Type Anes PTL Lv    3 Para            2 Para            1 Para                GYNE HISTORY:  Periods none due to menopause    History   Sexual Activity    Sexual activity: Yes                 MEDICAL HISTORY:  Past Medical History:    Amenorrhea    Anxiety    Diabetes (HCC)    High blood pressure    High cholesterol    Hypertension    Mixed hyperlipidemia    Obesity    Scoliosis    Shortness of breath    Sleep apnea    needs new CPAP machine    Varicose vein       SURGICAL HISTORY:  Past Surgical History:   Procedure Laterality Date    Colonoscopy N/A 2024    ; colon polyps,hemorrhoids    Colonoscopy N/A 2024    Procedure: COLONOSCOPY;  Surgeon: Ghassan Mayer MD;  Location: [facility] ENDOSCOPY    Egd N/A 2024    ; esophagitis,Hiatal hernia      1989    Upper gi endoscopy,exam         SOCIAL HISTORY:  Social History     Socioeconomic History    Marital status:      Spouse name: Not on file    Number of children: Not on file    Years of education: Not on file    Highest education level: Not on file   Occupational History    Not on file   Tobacco Use    Smoking status: Never     Passive exposure: Never    Smokeless tobacco: Never   Vaping Use    Vaping status: Never Used   Substance and Sexual Activity    Alcohol use: Not Currently    Drug use: Never    Sexual activity: Yes   Other Topics Concern    Caffeine Concern No    Exercise No    Seat Belt No    Special Diet No    Stress Concern No    Weight Concern Yes    Grew up on a farm Yes    History of tanning Yes    Outdoor occupation Yes    Breast feeding No    Reaction to local anesthetic No    Pt has a pacemaker No    Pt has a defibrillator No   Social History Narrative    Not on file     Social Drivers of Health     Financial Resource Strain: Not on file   Food Insecurity: Low Risk  (2024)    Received from [facility]    Food Insecurity     Have there been times that your food ran out, and you didn't have  money to get more?: No     Are there times that you worry that this might happen?: No   Transportation Needs: Low Risk  (4/20/2024)    Received from [facility]    Transportation Needs     Do you have trouble getting transportation to medical appointments?: No     How do you normally get to and from your appointments?: Not on file   Physical Activity: Not on file   Stress: Not on file   Social Connections: Not on file   Housing Stability: Not on file (4/20/2024)       FAMILY HISTORY:  No family history on file.    MEDICATIONS:    Current Outpatient Medications:     clarithromycin 500 MG Oral Tab, Take 1 tablet (500 mg total) by mouth 2 (two) times daily for 14 days., Disp: 28 tablet, Rfl: 0    omeprazole 20 MG Oral Capsule Delayed Release, Take 1 capsule (20 mg total) by mouth 2 (two) times daily before meals for 14 days., Disp: 28 capsule, Rfl: 0    methylPREDNISolone 4 MG Oral Tablet Therapy Pack, Take by oral route., Disp: 21 tablet, Rfl: 0    hydrocortisone 20 MG Oral Tab, Take 1 tablet (20 mg total) by mouth daily. Crush and mix tabs into Benadryl elixir, Disp: 8 tablet, Rfl: 0    diphenhydrAMINE 12.5 MG/5ML Oral Elixir, Dispense 500cc Benadryl. To be mixed with the 8 tabs of 20mg Hydrocortisone. Swish and can swallow 5cc of the mixture every 6 hours x 10 days., Disp: 500 mL, Rfl: 0    Omeprazole 40 MG Oral Capsule Delayed Release, Take 1 capsule (40 mg total) by mouth 2 (two) times daily before meals., Disp: 180 capsule, Rfl: 0    Blood Pressure Monitoring Does not apply Kit, Check BP twice daily, Disp: 1 kit, Rfl: 0    semaglutide (OZEMPIC, 0.25 OR 0.5 MG/DOSE,) 2 MG/3ML Subcutaneous Solution Pen-injector, Inject 0.25 mg into the skin once a week., Disp: 3 mL, Rfl: 0    gabapentin 300 MG Oral Cap, Take 1 capsule (300 mg total) by mouth As Directed. Take 1 capsule (300 mg total) nightly for 3 days, if tolerated, add morning dose of 1 capsule (300 mg total) for 3 days and if tolerated add  afternoon dose of 1 capsule (300 mg ) .  NOTE: increase doses slowly to minimize side effects., Disp: 90 capsule, Rfl: 0    Insulin Pen Needle (PEN NEEDLES) 32G X 4 MM Does not apply Misc, 1 each daily., Disp: 90 each, Rfl: 0    valsartan 160 MG Oral Tab, Take 1 tablet (160 mg total) by mouth daily., Disp: 90 tablet, Rfl: 3    ketoconazole 2 % External Cream, Apply to affected area twice daily, Disp: 30 g, Rfl: 0    metFORMIN  MG Oral Tablet 24 Hr, Take 1 tablet (750 mg total) by mouth daily., Disp: 90 tablet, Rfl: 2    atorvastatin 10 MG Oral Tab, Take 1 tablet (10 mg total) by mouth nightly., Disp: 90 tablet, Rfl: 3    furosemide 20 MG Oral Tab, Take  1 tab po daily, Disp: 90 tablet, Rfl: 0    amoxicillin 500 MG Oral Tab, Take 2 tablets (1,000 mg total) by mouth 2 (two) times daily for 14 days. (Patient not taking: Reported on 12/19/2024), Disp: 56 tablet, Rfl: 0    ALLERGIES:  Allergies[1]           Assessment & Plan:    1. Postmenopausal bleeding  -has appointment tomorrow 12/19/24 with E.P. for an EMB                [1]   Allergies  Allergen Reactions    Trulicity [Dulaglutide] NAUSEA ONLY    Lactose ITCHING

## 2024-12-20 ENCOUNTER — TELEPHONE (OUTPATIENT)
Dept: CASE MANAGEMENT | Age: 59
End: 2024-12-20

## 2024-12-20 ENCOUNTER — OFFICE VISIT (OUTPATIENT)
Facility: CLINIC | Age: 59
End: 2024-12-20
Payer: MEDICAID

## 2024-12-20 VITALS
SYSTOLIC BLOOD PRESSURE: 128 MMHG | HEART RATE: 88 BPM | DIASTOLIC BLOOD PRESSURE: 78 MMHG | HEIGHT: 64 IN | BODY MASS INDEX: 54 KG/M2

## 2024-12-20 DIAGNOSIS — D25.9 UTERINE LEIOMYOMA, UNSPECIFIED LOCATION: ICD-10-CM

## 2024-12-20 DIAGNOSIS — Z01.812 PRE-PROCEDURAL LABORATORY EXAMINATION: ICD-10-CM

## 2024-12-20 DIAGNOSIS — N95.0 PMB (POSTMENOPAUSAL BLEEDING): Primary | ICD-10-CM

## 2024-12-20 LAB
CONTROL LINE PRESENT WITH A CLEAR BACKGROUND (YES/NO): YES YES/NO
KIT LOT #: NORMAL NUMERIC
PREGNANCY TEST, URINE: NEGATIVE

## 2024-12-20 PROCEDURE — 81025 URINE PREGNANCY TEST: CPT | Performed by: OBSTETRICS & GYNECOLOGY

## 2024-12-20 PROCEDURE — 58100 BIOPSY OF UTERUS LINING: CPT | Performed by: OBSTETRICS & GYNECOLOGY

## 2024-12-20 PROCEDURE — 88305 TISSUE EXAM BY PATHOLOGIST: CPT | Performed by: OBSTETRICS & GYNECOLOGY

## 2024-12-20 RX ORDER — IBUPROFEN 200 MG
400 TABLET ORAL ONCE
Status: COMPLETED | OUTPATIENT
Start: 2024-12-20 | End: 2024-12-20

## 2024-12-20 RX ADMIN — IBUPROFEN 400 MG: 200 MG TABLET ORAL at 10:16:00

## 2024-12-20 NOTE — PROCEDURES
Endometrial Biopsy     Pre-Procedure Care:   Consent was obtained.  Procedure/risks were explained.  Questions were answered.  Correct patient was identified.  Correct side and site were confirmed.    Pregnancy Results: negative from urine test   Birth control method(s) used:  postmenopausal     Indication: PMB    Pre-Medications:    The patient was premedicated with Ibuprofen .    Description of Procedure:   A bivalve speculum was placed in the vagina and the cervix was prepped with betadine solution.   Single tooth tenaculum placed at the 12 o'clock position.   The uterine cavity was sounded at 7 cm.   The endometrial cavity was curetted for pipelle tissue sampling with 3 passes.  Specimen was sent to pathology.   The single tooth tenaculum was removed.   Good hemostasis was noted.  There were no complications.    There was no blood loss.      Discharge instructions were provided to the patient.    Visit Plan:    Await final pathology prior to treatment.

## 2024-12-20 NOTE — TELEPHONE ENCOUNTER
Gabino Cota        Status: DENIED        Reference number [id number]     A copy of the denial letter is filed under the MEDIA tab, reference for complete details. You may reach out to Chris at [phone] to discuss decision.     Please reach out to patient with plan of care.      Thank you  Michelle CORTEZ

## 2024-12-26 ENCOUNTER — TELEPHONE (OUTPATIENT)
Dept: OBGYN CLINIC | Facility: CLINIC | Age: 59
End: 2024-12-26

## 2024-12-26 NOTE — TELEPHONE ENCOUNTER
Spoke with daughter    She states that they are continuing care with another gynecologist. They completed necessary testing and will get further recommendations from gynecologist. Aware we will notify Dr. Kenney.

## 2024-12-26 NOTE — TELEPHONE ENCOUNTER
----- Message from Jorge A Kenney sent at 12/22/2024  8:43 AM CST -----  Pt needs a consult visit [facility] endocrinology,  labs noted,  will d/w pt on Monday.  Please call pt and help her make an appt with endocrinology, dr reveles's group.

## 2024-12-30 ENCOUNTER — HOSPITAL ENCOUNTER (EMERGENCY)
Facility: HOSPITAL | Age: 59
Discharge: HOME | End: 2024-12-31
Attending: EMERGENCY MEDICINE
Payer: MEDICAID

## 2024-12-30 ENCOUNTER — APPOINTMENT (OUTPATIENT)
Dept: GENERAL RADIOLOGY | Facility: HOSPITAL | Age: 59
End: 2024-12-30
Attending: EMERGENCY MEDICINE
Payer: MEDICAID

## 2024-12-30 ENCOUNTER — OFFICE VISIT (OUTPATIENT)
Dept: INTERNAL MEDICINE CLINIC | Facility: CLINIC | Age: 59
End: 2024-12-30
Payer: MEDICAID

## 2024-12-30 VITALS
BODY MASS INDEX: 50.02 KG/M2 | WEIGHT: 293 LBS | HEIGHT: 64 IN | SYSTOLIC BLOOD PRESSURE: 138 MMHG | DIASTOLIC BLOOD PRESSURE: 90 MMHG | HEART RATE: 82 BPM

## 2024-12-30 DIAGNOSIS — F43.29 STRESS AND ADJUSTMENT REACTION: ICD-10-CM

## 2024-12-30 DIAGNOSIS — L20.9 ATOPIC DERMATITIS, UNSPECIFIED TYPE: Primary | ICD-10-CM

## 2024-12-30 DIAGNOSIS — F32.A DEPRESSION, UNSPECIFIED DEPRESSION TYPE: Primary | ICD-10-CM

## 2024-12-30 DIAGNOSIS — M54.50 LUMBAR PAIN: ICD-10-CM

## 2024-12-30 DIAGNOSIS — R07.9 CHEST PAIN, UNSPECIFIED TYPE: ICD-10-CM

## 2024-12-30 DIAGNOSIS — R07.89 CHEST DISCOMFORT: ICD-10-CM

## 2024-12-30 DIAGNOSIS — R45.851 SUICIDAL IDEATION: ICD-10-CM

## 2024-12-30 LAB
ALBUMIN SERPL-MCNC: 4.6 G/DL (ref 3.2–4.8)
ALP LIVER SERPL-CCNC: 85 U/L
ALT SERPL-CCNC: 79 U/L
ANION GAP SERPL CALC-SCNC: 6 MMOL/L (ref 0–18)
AST SERPL-CCNC: 54 U/L (ref ?–34)
BASOPHILS # BLD AUTO: 0.03 X10(3) UL (ref 0–0.2)
BASOPHILS NFR BLD AUTO: 0.4 %
BILIRUB DIRECT SERPL-MCNC: 0.1 MG/DL (ref ?–0.3)
BILIRUB SERPL-MCNC: 0.4 MG/DL (ref 0.3–1.2)
BILIRUB UR QL: NEGATIVE
BUN BLD-MCNC: 14 MG/DL (ref 9–23)
BUN/CREAT SERPL: 16.9 (ref 10–20)
CALCIUM BLD-MCNC: 9.7 MG/DL (ref 8.7–10.4)
CHLORIDE SERPL-SCNC: 107 MMOL/L (ref 98–112)
CLARITY UR: CLEAR
CO2 SERPL-SCNC: 28 MMOL/L (ref 21–32)
CREAT BLD-MCNC: 0.83 MG/DL
D DIMER PPP FEU-MCNC: 0.29 UG/ML FEU (ref ?–0.59)
DEPRECATED RDW RBC AUTO: 45.3 FL (ref 35.1–46.3)
EGFRCR SERPLBLD CKD-EPI 2021: 81 ML/MIN/1.73M2 (ref 60–?)
EOSINOPHIL # BLD AUTO: 0.25 X10(3) UL (ref 0–0.7)
EOSINOPHIL NFR BLD AUTO: 3 %
ERYTHROCYTE [DISTWIDTH] IN BLOOD BY AUTOMATED COUNT: 14.5 % (ref 11–15)
ETHANOL SERPL-MCNC: <3 MG/DL (ref ?–3)
FLUAV + FLUBV RNA SPEC NAA+PROBE: NEGATIVE
FLUAV + FLUBV RNA SPEC NAA+PROBE: NEGATIVE
GLUCOSE BLD-MCNC: 121 MG/DL (ref 70–99)
GLUCOSE UR-MCNC: NORMAL MG/DL
HCT VFR BLD AUTO: 38.7 %
HGB BLD-MCNC: 12.7 G/DL
HGB UR QL STRIP.AUTO: NEGATIVE
IMM GRANULOCYTES # BLD AUTO: 0.02 X10(3) UL (ref 0–1)
IMM GRANULOCYTES NFR BLD: 0.2 %
KETONES UR-MCNC: NEGATIVE MG/DL
LEUKOCYTE ESTERASE UR QL STRIP.AUTO: NEGATIVE
LYMPHOCYTES # BLD AUTO: 2.49 X10(3) UL (ref 1–4)
LYMPHOCYTES NFR BLD AUTO: 30 %
MAGNESIUM SERPL-MCNC: 2 MG/DL (ref 1.6–2.6)
MCH RBC QN AUTO: 28.2 PG (ref 26–34)
MCHC RBC AUTO-ENTMCNC: 32.8 G/DL (ref 31–37)
MCV RBC AUTO: 85.8 FL
MONOCYTES # BLD AUTO: 0.67 X10(3) UL (ref 0.1–1)
MONOCYTES NFR BLD AUTO: 8.1 %
NEUTROPHILS # BLD AUTO: 4.84 X10 (3) UL (ref 1.5–7.7)
NEUTROPHILS # BLD AUTO: 4.84 X10(3) UL (ref 1.5–7.7)
NEUTROPHILS NFR BLD AUTO: 58.3 %
NITRITE UR QL STRIP.AUTO: NEGATIVE
OSMOLALITY SERPL CALC.SUM OF ELEC: 294 MOSM/KG (ref 275–295)
PH UR: 7 [PH] (ref 5–8)
PLATELET # BLD AUTO: 216 10(3)UL (ref 150–450)
POTASSIUM SERPL-SCNC: 4 MMOL/L (ref 3.5–5.1)
PROT SERPL-MCNC: 7.4 G/DL (ref 5.7–8.2)
PROT UR-MCNC: NEGATIVE MG/DL
RBC # BLD AUTO: 4.51 X10(6)UL
RSV RNA SPEC NAA+PROBE: NEGATIVE
SARS-COV-2 RNA RESP QL NAA+PROBE: NOT DETECTED
SODIUM SERPL-SCNC: 141 MMOL/L (ref 136–145)
SP GR UR STRIP: 1.02 (ref 1–1.03)
TROPONIN I SERPL HS-MCNC: <3 NG/L
TROPONIN I SERPL HS-MCNC: <3 NG/L
UROBILINOGEN UR STRIP-ACNC: NORMAL
WBC # BLD AUTO: 8.3 X10(3) UL (ref 4–11)

## 2024-12-30 PROCEDURE — 0241U SARS-COV-2/FLU A AND B/RSV BY PCR (GENEXPERT): CPT | Performed by: EMERGENCY MEDICINE

## 2024-12-30 PROCEDURE — 84484 ASSAY OF TROPONIN QUANT: CPT | Performed by: EMERGENCY MEDICINE

## 2024-12-30 PROCEDURE — 36415 COLL VENOUS BLD VENIPUNCTURE: CPT

## 2024-12-30 PROCEDURE — 71045 X-RAY EXAM CHEST 1 VIEW: CPT | Performed by: EMERGENCY MEDICINE

## 2024-12-30 PROCEDURE — 85025 COMPLETE CBC W/AUTO DIFF WBC: CPT | Performed by: EMERGENCY MEDICINE

## 2024-12-30 PROCEDURE — 80076 HEPATIC FUNCTION PANEL: CPT | Performed by: EMERGENCY MEDICINE

## 2024-12-30 PROCEDURE — 81003 URINALYSIS AUTO W/O SCOPE: CPT | Performed by: EMERGENCY MEDICINE

## 2024-12-30 PROCEDURE — 99285 EMERGENCY DEPT VISIT HI MDM: CPT

## 2024-12-30 PROCEDURE — 80048 BASIC METABOLIC PNL TOTAL CA: CPT | Performed by: EMERGENCY MEDICINE

## 2024-12-30 PROCEDURE — 82077 ASSAY SPEC XCP UR&BREATH IA: CPT | Performed by: EMERGENCY MEDICINE

## 2024-12-30 PROCEDURE — 99215 OFFICE O/P EST HI 40 MIN: CPT | Performed by: NURSE PRACTITIONER

## 2024-12-30 PROCEDURE — 83735 ASSAY OF MAGNESIUM: CPT | Performed by: EMERGENCY MEDICINE

## 2024-12-30 PROCEDURE — 90791 PSYCH DIAGNOSTIC EVALUATION: CPT

## 2024-12-30 PROCEDURE — 93010 ELECTROCARDIOGRAM REPORT: CPT

## 2024-12-30 PROCEDURE — 93005 ELECTROCARDIOGRAM TRACING: CPT

## 2024-12-30 PROCEDURE — 85379 FIBRIN DEGRADATION QUANT: CPT | Performed by: EMERGENCY MEDICINE

## 2024-12-30 RX ORDER — CLARITHROMYCIN 500 MG/1
500 TABLET ORAL 2 TIMES DAILY
COMMUNITY

## 2024-12-30 RX ORDER — HYDROCODONE BITARTRATE AND ACETAMINOPHEN 5; 325 MG/1; MG/1
1 TABLET ORAL ONCE
Status: COMPLETED | OUTPATIENT
Start: 2024-12-30 | End: 2024-12-30

## 2024-12-30 RX ORDER — BUSPIRONE HYDROCHLORIDE 7.5 MG/1
7.5 TABLET ORAL 3 TIMES DAILY PRN
Qty: 90 TABLET | Refills: 1 | Status: SHIPPED | OUTPATIENT
Start: 2024-12-30

## 2024-12-30 RX ORDER — TRIAMCINOLONE ACETONIDE 1 MG/G
CREAM TOPICAL 2 TIMES DAILY PRN
Qty: 15 G | Refills: 1 | Status: SHIPPED | OUTPATIENT
Start: 2024-12-30

## 2024-12-30 NOTE — PROGRESS NOTES
Judi White is a 59 year old female.  HPI:   Surinamese  Christine #[id number]    Pt here with daughter in law. She is reporting significant stressors at home with relationship with children. She is seeking some treatment for this. She reports she is working with a therapist. Reports SI and has plan.   She reports burning in chest that started today.   Reports bilateral leg cramps that wake her up at night. No redness or swelling. Denies any SOB, palpitations.   Current Outpatient Medications   Medication Sig Dispense Refill    AMOXICILLIN OR Take 2 500mg tablets by mouth twice a day      clarithromycin 500 MG Oral Tab Take 1 tablet (500 mg total) by mouth 2 (two) times daily.      triamcinolone 0.1 % External Cream Apply topically 2 (two) times daily as needed. 15 g 1    busPIRone HCl 7.5 MG Oral Tab Take 1 tablet (7.5 mg total) by mouth 3 (three) times daily as needed. 90 tablet 1    Omeprazole 40 MG Oral Capsule Delayed Release Take 1 capsule (40 mg total) by mouth 2 (two) times daily before meals. 180 capsule 0    Insulin Pen Needle (PEN NEEDLES) 32G X 4 MM Does not apply Misc 1 each daily. 90 each 0    valsartan 160 MG Oral Tab Take 1 tablet (160 mg total) by mouth daily. 90 tablet 3    ketoconazole 2 % External Cream Apply to affected area twice daily 30 g 0    metFORMIN  MG Oral Tablet 24 Hr Take 1 tablet (750 mg total) by mouth daily. 90 tablet 2    Blood Pressure Monitoring Does not apply Kit Check BP twice daily 1 kit 0    semaglutide (OZEMPIC, 0.25 OR 0.5 MG/DOSE,) 2 MG/3ML Subcutaneous Solution Pen-injector Inject 0.25 mg into the skin once a week. (Patient not taking: Reported on 12/30/2024) 3 mL 0    gabapentin 300 MG Oral Cap Take 1 capsule (300 mg total) by mouth As Directed. Take 1 capsule (300 mg total) nightly for 3 days, if tolerated, add morning dose of 1 capsule (300 mg total) for 3 days and if tolerated add afternoon dose of 1 capsule (300 mg ) .  NOTE: increase doses slowly to  minimize side effects. (Patient not taking: Reported on 12/30/2024) 90 capsule 0    atorvastatin 10 MG Oral Tab Take 1 tablet (10 mg total) by mouth nightly. (Patient not taking: Reported on 12/30/2024) 90 tablet 3    furosemide 20 MG Oral Tab Take  1 tab po daily (Patient not taking: Reported on 12/30/2024) 90 tablet 0      Past Medical History:    Amenorrhea    Anxiety    Diabetes (HCC)    High blood pressure    High cholesterol    Hypertension    Mixed hyperlipidemia    Obesity    Scoliosis    Shortness of breath    Sleep apnea    needs new CPAP machine    Varicose vein      Social History:  Social History     Socioeconomic History    Marital status:    Tobacco Use    Smoking status: Never     Passive exposure: Never    Smokeless tobacco: Never   Vaping Use    Vaping status: Never Used   Substance and Sexual Activity    Alcohol use: Not Currently    Drug use: Never    Sexual activity: Yes   Other Topics Concern    Caffeine Concern No    Exercise No    Seat Belt No    Special Diet No    Stress Concern No    Weight Concern Yes    [place] up [place]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    History of tanning Yes    Outdoor occupation Yes    Breast feeding No    Reaction to local anesthetic No    Pt has a pacemaker No    Pt has a defibrillator No     Social Drivers of Health     Food Insecurity: Low Risk  (4/20/2024)    Received from [facility]    Food Insecurity     Have there been times that your food ran out, and you didn't have money to get more?: No     Are there times that you worry that this might happen?: No   Transportation Needs: Low Risk  (4/20/2024)    Received from [facility]    Transportation Needs     Do you have trouble getting transportation to medical appointments?: No        REVIEW OF SYSTEMS:   Review of Systems   Constitutional:  Negative for activity change, appetite change, chills, diaphoresis, fatigue, fever and unexpected weight change.   HENT: Negative.     Eyes:  Negative  for visual disturbance.   Respiratory:  Negative for cough, chest tightness, shortness of breath and wheezing.    Cardiovascular:  Positive for chest pain. Negative for palpitations and leg swelling.   Gastrointestinal:  Negative for abdominal distention, abdominal pain, constipation, diarrhea, nausea and vomiting.   Genitourinary: Negative.    Musculoskeletal: Negative.    Neurological:  Negative for dizziness, tremors, seizures, syncope, facial asymmetry, speech difficulty, weakness, light-headedness, numbness and headaches.   Psychiatric/Behavioral:  Positive for dysphoric mood and suicidal ideas. Negative for agitation, self-injury and sleep disturbance. The patient is nervous/anxious.           EXAM:   /90   Pulse 82   Ht 5' 4\" (1.626 m)   Wt (!) 312 lb (141.5 kg)   BMI 53.55 kg/m²     Physical Exam  Vitals reviewed.   Constitutional:       General: She is in acute distress.      Appearance: She is obese. She is not ill-appearing, toxic-appearing or diaphoretic.   HENT:      Head: Normocephalic.   Eyes:      Conjunctiva/sclera: Conjunctivae normal.   Cardiovascular:      Rate and Rhythm: Normal rate and regular rhythm.      Pulses: Normal pulses.      Heart sounds: Normal heart sounds.   Pulmonary:      Effort: Pulmonary effort is normal. No respiratory distress.      Breath sounds: Normal breath sounds.   Skin:     General: Skin is warm.      Coloration: Skin is not jaundiced.      Findings: No erythema.   Neurological:      Mental Status: She is oriented to person, place, and time.      Motor: No weakness.      Gait: Gait normal.   Psychiatric:         Attention and Perception: Attention and perception normal.         Mood and Affect: Mood is anxious and depressed. Affect is tearful.         Speech: Speech normal.         Behavior: Behavior normal. Behavior is cooperative.         Thought Content: Thought content includes suicidal ideation. Thought content includes suicidal plan.             ASSESSMENT AND PLAN:   1. Suicidal ideation  -Inquired multiple times during visit if patient will hurt herself after today's visit and patient answered yes. Offered to call ambulance, EMS arrived. Pt now denies SI/HI or plan however does reports burning sensation of left side chest and cramping in legs. Discussed need to r/o cardiac etiology and pt agreed to go to ER via ambulance.   -Message sent to T team to help coordinate appt with therapist after ER discharge.     2. Atopic dermatitis, unspecified type  -trial of triamcinolone.   - triamcinolone 0.1 % External Cream; Apply topically 2 (two) times daily as needed.  Dispense: 15 g; Refill: 1    3. Stress and adjustment reaction  -as above #1,   -Prior to pt's admission of SI/plan, tx was discussed and ot was interested in as needed treatment. Discussed benefits of SSRI, but defers at this time, prefers to see a therapist first.   -Due to SI and plan reported, 911 was called and will transport patient to [place] ER.   - busPIRone HCl 7.5 MG Oral Tab; Take 1 tablet (7.5 mg total) by mouth 3 (three) times daily as needed.  Dispense: 90 tablet; Refill: 1    4. Chest pain, unspecified type  -as above #1.      45 minutes spent face to face with patient including counseling, education, chart review, physical exam and ordering treatment.     The patient indicates understanding of these issues and agrees to the plan.  The patient is asked to return in 2-3 days after ER discharge.     The above note was creating using Dragon speech recognition technology. Please excuse any typos.

## 2024-12-31 VITALS
DIASTOLIC BLOOD PRESSURE: 65 MMHG | OXYGEN SATURATION: 92 % | RESPIRATION RATE: 10 BRPM | SYSTOLIC BLOOD PRESSURE: 138 MMHG | TEMPERATURE: 97 F | HEART RATE: 74 BPM

## 2024-12-31 LAB
ATRIAL RATE: 73 BPM
ATRIAL RATE: 82 BPM
P AXIS: 35 DEGREES
P AXIS: 4 DEGREES
P-R INTERVAL: 166 MS
P-R INTERVAL: 192 MS
Q-T INTERVAL: 360 MS
Q-T INTERVAL: 384 MS
QRS DURATION: 82 MS
QRS DURATION: 90 MS
QTC CALCULATION (BEZET): 420 MS
QTC CALCULATION (BEZET): 423 MS
R AXIS: -23 DEGREES
R AXIS: -29 DEGREES
T AXIS: 23 DEGREES
T AXIS: 41 DEGREES
VENTRICULAR RATE: 73 BPM
VENTRICULAR RATE: 82 BPM

## 2024-12-31 RX ORDER — HYDROCODONE BITARTRATE AND ACETAMINOPHEN 5; 325 MG/1; MG/1
1-2 TABLET ORAL EVERY 6 HOURS PRN
Qty: 10 TABLET | Refills: 0 | Status: SHIPPED | OUTPATIENT
Start: 2024-12-31 | End: 2025-01-05

## 2024-12-31 NOTE — BH LEVEL OF CARE ASSESSMENT
Crisis Evaluation Assessment    Judi White YOB: 1965   Age 59 year old MRN [id number]   Location [facility] EMERGENCY DEPARTMENT Attending No att. providers found      Patient's legal sex: female  Patient identifies as: female  Patient's birth sex: female  Preferred pronouns: she/her    Date of Service: 12/31/2024    Referral Source:  Referral Source  Where was crisis eval performed?: On-site  Referral Source: Community  Referral Source Info: Patient presents to the ED by EMS    Patient was seen at [place] ED room 11 on 12/30/2024 approximately 2325 with the use of a [place]  ID number [id number].  Daughter-in-law at bedside with patient's permission.    Reason for Crisis Evaluation   Patient is a 59-year-old  [place] female who lives with her son, daughter and daughter-in-law.  Patient presents to the ED by EMS due to her doctor's concern that patient is suicidal.    Met with patient who stated that she never said anything about ending her life or being suicidal.  Patient acknowledged that she said she was very stressed because her doctor told her that she might be pregnant. She she was so upset saying how could that be possible.Pt said she  lives with her adult son, her  is back in [facility], she does not date anyone here, how could she possibly be pregnant.  She said she kept trying to tell them that is not possible. She said her son was also very upset about it.  Patient said jennifer has been here in the [place] for 2 years fleeing from her country due to war there, hoping to be safe but she does not feel safe here and she gets emotional about things.  Patient says that stress is worse for her now and she feels desperate because she does not feel like she has any protection here.  Patient says that she does not feel there is any safety net , she explains her cash assistance was taken away and her son pays for everything. She feels very bad about that but she is unable  to get a job at her because she does not speak the language.  Patient says she does not know how people live here.  Patient says that she worries a lot about [place] [place] and 2 grandchildren.  Patient also reports stress hearing that her  is drinking a lot and selling items in their home in order to keep drinking.  Patient reports significant anxiety about going out in public fearful to go to the park and states she has been very irritable and emotional.  She said sometimes she cries when she is watching the news and feels depressed about her situation.    Patient concedes that she does feel hopeless and trapped in her current situation.  However,pt continues to report that she does not have thoughts of going to sleep and not wake up, she does not have thoughts of killing herself and has no plan or intention of harming herself.  Patient denies any history of suicidal behavior.  Patient is consistent her answers to EMS, triage and ED RN , ED doctor and this writer and that she continually denies suicidal ideations , denies any plan or intent to harm herself and denies any history of suicidal behavior.  Patient said she feels like she has reasons to live, does not want to die and has no plan or intention of ending her life.      Collateral  EMS report states the crew met with the doctor from the doctor's office who said patient was at her doctor's office there for leg cramps and when asked if she wanted to hurt herself she advised she would if she went home.  Crew made contact with patient and ask if patient wanted to hurt herself and she advised she did not and did not have a plan.   Stated the patient told her something different but then patient advised to have chest pain and they brought her to the ED.  ED triage note states that per patient's doctor patient said she would harm herself and the question was asked the patient denied any to the EMS.  Patient denies any  thoughts of self-harm or history of self-harm; patient's daughter-in-law said [place] [facility] last year because of the war and is worried about the family that still remains in [facility].  ED doctor's note states that patient is depressed and very stressed about the war back in [facility]; she denies suicidal thoughts or any concerns about wanting to hurt herself -the questions asked multiple times, the daughter agrees with this answer, patient continues to deny any SI.      Suicide Crisis Syndrome:  Suicide Crisis Syndrome  Do you feel trapped with no good options left?: Yes  Are you overwhelmed, or have you lost control by negative thoughts filling your head? : Yes    Suicide Risk Screening:  Source of information for CSSR: Patient;Collateral  In what setting is the screener performed?: in person  1. Have you wished you were dead or wished you could go to sleep and not wake up? (past 30 days): No (Patient denies)  2. Have you actually had any thoughts of killing yourself? (past 30 days): No (Patient denies)              6. Have you ever done anything, started to do anything, or prepared to do anything to end your life? (lifetime): No (Patient denies)     Score - BH OV: No Risk    Suicide Risk Assessments:  Suicidal Thoughts, Plan and Intent (this information to be used in conjunction with CSSR-S Suicide Screening)  Describe thoughts, ideation and intent:: Patient denies suicidal ideations, denies any plan or intent to harm self and has no history of suicidal behavior.  Patient reports reasons to live and does not want to die.  Frequency: How many times have you had these thoughts?: Other (comment) (Patient denies suicidal ideations)  Duration: When you have the thoughts, how long do they last?: Other (comment) (Patient denies suicidal ideation)  Controllability: Could/can you stop thinking about killing yourself or wanting to die if you want to?: Other (comment) (Patient denies suicidal  ideations)  Identify Risk Factors  Do you have access to lethal methods to attempt suicide?: Yes  Describe access to lethal methods: Typical household and community items  Clinical Status:: Hopelessness;Agitation or severe anxiety  Activating Events/Recent Stressors:: Significant negative event(s) (legal, financial, relationship, etc.) (Patient reports stress about her  drinking and selling items, the war in [place] and not feeling safe here in the [place])  Identify Protective Factors  Internal: Identifies reasons for living  External: Responsibility to family or others, living with family;Supportive social network of family or friends  Risk Stratification  Risk Level: Low           Non-Suicidal Self-Injury:   Patient denies any history of self-injurious behavior.      Risk to Others  Patient denies any history of aggression to persons or property.    Access to Means:  Access to Means  Has access to means to attempt suicide, self-injure, harm others, or damage property?: Yes  Description of Access: Typical household and community items  Discussion of Removal of Access to Means: Patient continues to deny suicidal ideations  Access to Firearm/Weapon: No  Discussion of Removal of Firearm/Weapon: Patient denies access to firearm  Do you have a firearm owner identification (FOID) card?: No  Collateral information on access to means/firearms/weapons: Daughter-in-law at bedside confirms no firearms in the home    Protective Factors:   Lives with family, reasons to live, supportive family     Review of Psychiatric Systems:  Patient is dressed in hospital gown.  Patient makes no eye contact.  Patient presents as stressed and anxious.  Mood and affect are congruent.  Patient is talkative.  Thought content and processes are linear and goal-directed.  Patient denies perceptual distortions.  Patient denies any history of delusions or jaqueline.  Patient does report feeling very anxious and stressed out due to multiple life  stressors as indicated in opening statements.    Patient reports feeling anxious and depressed but denies any appetite or sleep disturbance.     Substance Use:  BAL <3, UDS not completed.  Patient denies use of alcohol or drugs, denies any history of rehab or detox and denies any family history of substance abuse issues.          Functional Achievement:   Patient is currently unemployed.  Patient has no history of  service.      Ability to Care for Self::   Patient is independent in her ADLs, is ambulatory and does not use any assistive devices.  Patient is her own guardian and does not have a POA.      Current Treatment and Treatment History:  Patient has no psychiatric history for patient and daughter-in-law.  Patient denies any history of psychiatric admissions.  Patient is not currently in treatment with anyone nor on any psychiatric medications.    Relevant Social History:  No family history of mental health issues is reported.  Patient is  and has 3 children.  Currently staying with her son and daughter-in-law and one of her daughters is here in the  with her while the other daughter is back in [facility] with 2 of her grandchildren.  Patient reports to get along well with her family here and back home.  Patient denies any legal issues.    [place]racho and Complex (as applicable):      t Medical (as applicable):   Sleep apnea, hypertension, diabetes, high cholesterol, obesity  Daughter-in-law and patient both confirm that patient does not have or use a CPAP.    EDP Assessment (as applicable):          Abuse Assessment:  Abuse Assessment  Physical Abuse: Denies  Verbal Abuse: Denies  Sexual Abuse: Denies  Neglect: Denies  Does anyone say or do something to you that makes you feel unsafe?: No  Have You Ever Been Harmed by a Partner/Caregiver?: No  Health Concerns r/t Abuse: No  Possible Abuse Reportable to:: Not appropriate for reporting to authorities    Mental Status Exam:   General  Appearance  Characteristics: Other (comment) (Patient is dressed in hospital gown)  Eye Contact: No contact  Psychomotor Behavior  Gait/Movement: Other (comment) (Did not observe patient ambulate)  Abnormal movements: None  Posture: Stiff  Rate of Movement: Normal  Mood and Affect  Mood or Feelings: Stressed  Appropriateness of Affect: Congruent to mood  Range of Affect: Normal  Stability of Affect: Stable  Attitude toward staff: Co-operative  Speech  Rate of Speech: Appropriate  Flow of Speech: Appropriate  Intensity of Volume: Ordinary  Clarity: Clear  Cognition  Concentration: Unimpaired  Memory: Recent memory intact;Remote memory intact  Orientation Level: Oriented X4  Insight: Fair  Fair/poor insight as evidenced by: Behavior and symptoms  Judgment: Fair  Fair/poor judgment as evidenced by: Behavior and symptoms  Thought Patterns  Clarity/Relevance: Coherent  Flow: Organized  Content: Ordinary  Level of Consciousness: Alert  Level of Consciousness: Alert  Behavior  Exhibited behavior: Participated      Disposition: Consulted with ED doctor who was in agreement with recommendation for outpatient referrals and discharged home with family.    Rationale for Treatment Recommendation:   Patient presents to the ED with concerns about suicidal ideations.  Patient consistently denies to have everyone in the ED including EMS any suicidal ideation, adamantly reports she is not suicidal, has no history of suicidal behavior and no plan or intent to take her life. C-SSRS low risk.  Patient is not using drugs or alcohol, is not in treatment  with anyone nor on medications.  Patient does report significant stressors due to recent move, war in her country and recent medical mixup doctors insisting that she was pregnant.  Patient does report feeling tearful and sad when watching the news and also feeling anxious and nervous to leave the home.    Patient does not meet criteria for inpatient admission and both daughter-in-law and  patient report no safety concerns and feels safe to be going home.  Patient was given  outpatient referrals for therapist who speak Sierra Leonean.      Level of Care Recommendations  Consulted with: (P) Dr Jaime  Level of Care Recommendation: (P) Outpatient  Outpatient Criteria: (P) Regular therapy needed;Support needed  Outpatient Recommendations: (P) Therapy         Diagnoses with F-Codes:  Primary Psychiatric Diagnosis  F41.9 Unspecified anxiety disorder     Secondary Psychiatric Diagnoses     Pervasive Diagnoses (as applicable)     Pertinent Non-Psychiatric Diagnoses           Bernardo WATKINS

## 2024-12-31 NOTE — DISCHARGE INSTRUCTIONS
Please follow-up with the given resources below to help with your depression.  Please follow-up with the cardiology clinic as soon as possible for further evaluation.  Norco as needed for pain for your back.  Follow-up with the back doctor that you have the appointment with.  You should also follow-up with your primary doctor.  Read all instructions.  Return for changes worsening    You had a level of care assessment completed today in the ED.  The recommendation was for outpatient services.The following providers offer counseling services and speak Czech:    [facility]     Maria Guadalupe Greenberg     Brionna Wilkins     [facility] also offers a variety of programs: [phone]      If you are experiencing a mental health crisis, please call 911 or go to your nearest ED.  If you are not in immediate danger but require emotional support and/or assistance, please contact: [facility] at [phone] or [facility] [phone].

## 2024-12-31 NOTE — ED PROVIDER NOTES
Patient Seen in: [facility] Emergency Department      History     Chief Complaint   Patient presents with    Chest Pain Angina     Stated Complaint: poss psych, CP, leg cramps    Subjective:   59-year-old female with hypertension, hyperlipidemia, diabetes, obesity, recently placed on clarithromycin for esophagitis who had recent endoscopy earlier this month here from doctor's office for concerns of leg cramps and chest pains.  The patient said she has been on the clarithromycin since Thursday.  Her daughter-in-law helps to translate Serbian.  The patient is comfortable with this.  Since the clarithromycin started she is getting increased leg cramps.  they tend to happen in the middle of the night.She also has chronic low back pain which she is complaining of some aches right now.  Also she is depressed and very stressed about the war back in [place] so she got really stressed and she developed a burning chest discomfort in her upper sternal area for the last 3 hours.  It does not radiate.  No shortness of breath.  No nausea or diaphoresis.  She denies suicidal thoughts or any concerns about wanting to hurt herself.  This question was asked multiple times and the daughter-in-law agrees with this answer.  No cough or congestion.  No vomiting or diarrhea.  Patient also had a recent workup by gynecology for abnormal uterine bleeding.              Objective:     Past Medical History:    Amenorrhea    Anxiety    Diabetes (HCC)    High blood pressure    High cholesterol    Hypertension    Mixed hyperlipidemia    Obesity    Scoliosis    Shortness of breath    Sleep apnea    needs new CPAP machine    Varicose vein              Past Surgical History:   Procedure Laterality Date    Colonoscopy N/A 12/04/2024    ; colon polyps,hemorrhoids    Colonoscopy N/A 12/4/2024    Procedure: COLONOSCOPY;  Surgeon: Ghassan Mayer MD;  Location: [facility] ENDOSCOPY    Egd N/A 12/04/2024    ; esophagitis,Hiatal  hernia      1989    Upper gi endoscopy,exam                  Social History     Socioeconomic History    Marital status:    Tobacco Use    Smoking status: Never     Passive exposure: Never    Smokeless tobacco: Never   Vaping Use    Vaping status: Never Used   Substance and Sexual Activity    Alcohol use: Not Currently    Drug use: Never    Sexual activity: Yes   Other Topics Concern    Caffeine Concern No    Exercise No    Seat Belt No    Special Diet No    Stress Concern No    Weight Concern Yes    Grew up on a farm Yes    History of tanning Yes    Outdoor occupation Yes    Breast feeding No    Reaction to local anesthetic No    Pt has a pacemaker No    Pt has a defibrillator No     Social Drivers of Health     Food Insecurity: Low Risk  (2024)    Received from [facility]    Food Insecurity     Have there been times that your food ran out, and you didn't have money to get more?: No     Are there times that you worry that this might happen?: No   Transportation Needs: Low Risk  (2024)    Received from [facility]    Transportation Needs     Do you have trouble getting transportation to medical appointments?: No                  Physical Exam     ED Triage Vitals [24 1841]   /65   Pulse 86   Resp 16   Temp 97.4 °F (36.3 °C)   Temp src Oral   SpO2 99 %   O2 Device None (Room air)       Current Vitals:   Vital Signs  BP: 138/65  Pulse: 76  Resp: 16  Temp: 97.4 °F (36.3 °C)  Temp src: Oral    Oxygen Therapy  SpO2: 95 %  O2 Device: None (Room air)        Physical Exam  Constitutional:       Appearance: She is obese.   HENT:      Head: Normocephalic.      Right Ear: External ear normal.      Left Ear: External ear normal.      Nose: Nose normal.      Mouth/Throat:      Mouth: Mucous membranes are moist.   Eyes:      Pupils: Pupils are equal, round, and reactive to light.   Cardiovascular:      Rate and Rhythm: Normal rate and regular  rhythm.      Pulses: Normal pulses.      Heart sounds: Normal heart sounds.      Comments: Tenderness to the upper sternum reproduces her pain  Chest:      Chest wall: Tenderness present.   Abdominal:      Palpations: Abdomen is soft.   Musculoskeletal:         General: Normal range of motion.      Cervical back: Normal range of motion.   Skin:     General: Skin is warm.   Neurological:      Mental Status: She is alert.      Sensory: No sensory deficit.      Motor: No weakness.             ED Course     Labs Reviewed   HEPATIC FUNCTION PANEL (7) - Abnormal; Notable for the following components:       Result Value    AST 54 (*)     ALT 79 (*)     All other components within normal limits   BASIC METABOLIC PANEL (8) - Abnormal; Notable for the following components:    Glucose 121 (*)     All other components within normal limits   TROPONIN I HIGH SENSITIVITY - Normal   D-DIMER - Normal   ETHYL ALCOHOL - Normal   MAGNESIUM - Normal   TROPONIN I HIGH SENSITIVITY - Normal   SARS-COV-2/FLU A AND B/RSV BY PCR (GENEXPERT) - Normal    Narrative:     This test is intended for the qualitative detection and differentiation of SARS-CoV-2, influenza A, influenza B, and respiratory syncytial virus (RSV) viral RNA in nasopharyngeal or nares swabs from individuals suspected of respiratory viral infection consistent with COVID-19 by their healthcare provider. Signs and symptoms of respiratory viral infection due to SARS-CoV-2, influenza, and RSV can be similar.    Test performed using the Xpert Xpress SARS-CoV-2/FLU/RSV (real time RT-PCR)  assay on the GeneXpert instrument, [employer], [place] [address].   This test is being used under the Food and Drug Administration's Emergency Use Authorization.    The authorized Fact Sheet for Healthcare Providers for this assay is available upon request from the laboratory.   CBC WITH DIFFERENTIAL WITH PLATELET   URINALYSIS WITH CULTURE REFLEX     EKG    Rate, intervals and axes as noted on EKG  Report.  Rate: 82  Rhythm: Sinus Rhythm  Reading: Moderate voltage criteria for LVH.  Possible anterior lateral infarct with poor anterior R wave progression.  No ST elevation.  QTc 420.  Abnormal EKG read by myself.  EKG was performed approximately 1 month ago and looks similar with the poor anterior R wave progression.  No significant change when compared.       ED Course as of 12/31/24 0045  ------------------------------------------------------------  Time: 12/30 1937  Comment: Cxr-   ------------------------------------------------------------  Time: 12/30 1940  Comment: Labs independently interpreted by me and compared to recent lab work.  She has a persistent mild transaminitis.  BMP normal, CBC normal, magnesium normal  ------------------------------------------------------------  Time: 12/30 2159  Comment: Repeat troponin is less than 3.  Waiting for psychiatric evaluation  ------------------------------------------------------------  Time: 12/31 0037  Comment: Patient seen by Soumya the psych liaison.  Family and patient continued to say she was never suicidal and never had a plan to harm self.  There was a language barrier issue at the facility.  ------------------------------------------------------------  Time: 12/31 0040  Comment: Patient has no interest in being admitted for further cardiac workup.  2 troponins negative.  ------------------------------------------------------------  Time: 12/31 0041  Comment: EKG #2 shows no change.  Normal sinus rhythm rate of 73 LVH criteria.  Poor anterior R wave progression.  I spoke to the daughter-in-law again and said if he does not want to be admitted she has to follow-up with cardiology quickly because of her risk factors and she is okay with this.    ------------------------------------------------------------  Time: 12/31 0042  Comment: Daughter-in-law also asked if I could give her some pain medicine for her low back because she has lumbar disc disease so I  will give her a couple days of Norco since I think NSAIDs may make her esophagitis and gastritis worse.              MDM      XR CHEST AP PORTABLE  (CPT=71045)    Result Date: 12/30/2024  CONCLUSION: No acute cardiopulmonary abnormality.   Dictated by (CST): Ranjit Abernathy MD on 12/30/2024 at 7:16 PM     Finalized by (CST): Ranjit Abernathy MD on 12/30/2024 at 7:17 PM                 Medical Decision Making  Patient with multiple complaints but appears to have been sent here because of this chest burning she is experiencing for 3+ hours.  It is reproducible on exam.  Differential could include but is not limited to ACS, dissection, PE, esophageal related, chest wall pain, pleurisy, pericarditis and many other possibilities.  Although she has risk factors this seems to be an atypical story.  It started at rest and brought on by stress.  Full workup in process and will discuss plan after 2 troponins.  Additionally, I offered patient some resources to talk to about her stress and depression and she is accepting of this.  No concerns about self-harm from the patient or her daughter-in-law.  I have extensively reviewed patient's chart.  She has been to multiple physicians this month alone including gynecology, ENT, GI.  I reviewed an endoscopy from earlier this month. I reviewed office note which mentions pt admitting to wanting to do self harm , but she then denied it and continues to deny. I spoke to Sheree the liaison for eval,     Amount and/or Complexity of Data Reviewed  External Data Reviewed: labs and notes.     Details: EGD Impression:  -Esophagus: LA grade B esophagitis. Hiatal hernia. Biopsies taken from the distal/proximal esophagus.  -Stomach: Unremarkable. Biopsied.  -Duodenum: Unremarkable      TSH normal 1 month ago    Gi phone-  EGD biopsies reviewed w/patient. H.pylori gastritis identified    Labs: ordered. Decision-making details documented in ED Course.  Radiology: ordered and independent interpretation  performed. Decision-making details documented in ED Course.        Disposition and Plan     Clinical Impression:  1. Depression, unspecified depression type    2. Chest discomfort    3. Lumbar pain         Disposition:  Discharge  12/31/2024 12:45 am    Follow-up:  Estelle Multani MD  [address] [place] [id number] [phone]    Follow up            Medications Prescribed:  Current Discharge Medication List        START taking these medications    Details   HYDROcodone-acetaminophen 5-325 MG Oral Tab Take 1-2 tablets by mouth every 6 (six) hours as needed for Pain.  Qty: 10 tablet, Refills: 0    Associated Diagnoses: Lumbar pain                 Supplementary Documentation:                                                            Birth Control Pills Counseling: Birth Control Pill Counseling: I discussed with the patient the potential side effects of OCPs including but not limited to increased risk of stroke, heart attack, thrombophlebitis, deep venous thrombosis, hepatic adenomas, breast changes, GI upset, headaches, and depression.  The patient verbalized understanding of the proper use and possible adverse effects of OCPs. All of the patient's questions and concerns were addressed.

## 2024-12-31 NOTE — ED INITIAL ASSESSMENT (HPI)
Pt arrives via EMS from doctors office, pt initially came in for c/o leg cramps, pt states she's also had pain in her back and knees.   Per pt's doctor, pt stated that she wanted to harm herself when the question was asked, pt denied any self harm with EMS. Pt's daughter in law at bedside, states that [place] from [facility] last year, because of the war, pt is worried about the family that is still in [facility] and gets worried when she watches news coverage on war. Pt denies any thoughts of self harm, or hx of self harm.   Pt also c/o chest pressure that started at 4pm today.   A&Ox4/4     EMS

## 2025-01-10 ENCOUNTER — PATIENT MESSAGE (OUTPATIENT)
Facility: CLINIC | Age: 60
End: 2025-01-10

## 2025-01-10 DIAGNOSIS — Z86.19 HISTORY OF HELICOBACTER PYLORI INFECTION: Primary | ICD-10-CM

## 2025-01-10 RX ORDER — OMEPRAZOLE 40 MG/1
40 CAPSULE, DELAYED RELEASE ORAL
Qty: 180 CAPSULE | Refills: 0 | OUTPATIENT
Start: 2025-01-10 | End: 2025-04-10

## 2025-01-10 NOTE — TELEPHONE ENCOUNTER
Requested Prescriptions     Pending Prescriptions Disp Refills    Omeprazole 40 MG Oral Capsule Delayed Release 180 capsule 0     Sig: Take 1 capsule (40 mg total) by mouth 2 (two) times daily before meals.       Omeprazole 40 MG Oral Capsule Delayed Release 180 capsule 0 12/4/2024 3/4/2025

## 2025-01-22 ENCOUNTER — NURSE TRIAGE (OUTPATIENT)
Dept: INTERNAL MEDICINE CLINIC | Facility: CLINIC | Age: 60
End: 2025-01-22

## 2025-01-22 ENCOUNTER — OFFICE VISIT (OUTPATIENT)
Dept: PODIATRY CLINIC | Facility: CLINIC | Age: 60
End: 2025-01-22
Payer: MEDICAID

## 2025-01-22 DIAGNOSIS — M20.12 HALLUX VALGUS OF LEFT FOOT: ICD-10-CM

## 2025-01-22 DIAGNOSIS — M79.672 FOOT PAIN, BILATERAL: ICD-10-CM

## 2025-01-22 DIAGNOSIS — M20.11 HALLUX VALGUS OF RIGHT FOOT: ICD-10-CM

## 2025-01-22 DIAGNOSIS — M21.612 HALLUX VALGUS WITH BUNIONS OF LEFT FOOT: ICD-10-CM

## 2025-01-22 DIAGNOSIS — Q66.212 METATARSUS PRIMUS VARUS OF LEFT FOOT: Primary | ICD-10-CM

## 2025-01-22 DIAGNOSIS — M79.671 FOOT PAIN, BILATERAL: ICD-10-CM

## 2025-01-22 DIAGNOSIS — M20.12 HALLUX VALGUS WITH BUNIONS OF LEFT FOOT: ICD-10-CM

## 2025-01-22 DIAGNOSIS — Q66.211 METATARSUS PRIMUS VARUS OF RIGHT FOOT: ICD-10-CM

## 2025-01-22 NOTE — PROGRESS NOTES
Judi White is a 59 year old female.   Chief Complaint   Patient presents with    Consult     Bilateral foot pain - Bunions - pain 10/10 with shoes- pain 4/10 in office         HPI:   Patient presents used  line for this visit.  She is complaining of painful bunions and now wants to proceed with surgery for them.  At today's visit reviewed nurse's history as taken above, allergies medications and medical history as documented below.  All changes duly noted  Allergies: Trulicity [dulaglutide] and Lactose   Current Outpatient Medications   Medication Sig Dispense Refill    triamcinolone 0.1 % External Cream Apply topically 2 (two) times daily as needed. 15 g 1    busPIRone HCl 7.5 MG Oral Tab Take 1 tablet (7.5 mg total) by mouth 3 (three) times daily as needed. 90 tablet 1    Omeprazole 40 MG Oral Capsule Delayed Release Take 1 capsule (40 mg total) by mouth 2 (two) times daily before meals. 180 capsule 0    valsartan 160 MG Oral Tab Take 1 tablet (160 mg total) by mouth daily. 90 tablet 3    ketoconazole 2 % External Cream Apply to affected area twice daily 30 g 0    metFORMIN  MG Oral Tablet 24 Hr Take 1 tablet (750 mg total) by mouth daily. 90 tablet 2    AMOXICILLIN OR Take 2 500mg tablets by mouth twice a day      clarithromycin 500 MG Oral Tab Take 1 tablet (500 mg total) by mouth 2 (two) times daily.      Blood Pressure Monitoring Does not apply Kit Check BP twice daily 1 kit 0    semaglutide (OZEMPIC, 0.25 OR 0.5 MG/DOSE,) 2 MG/3ML Subcutaneous Solution Pen-injector Inject 0.25 mg into the skin once a week. (Patient not taking: Reported on 1/22/2025) 3 mL 0    gabapentin 300 MG Oral Cap Take 1 capsule (300 mg total) by mouth As Directed. Take 1 capsule (300 mg total) nightly for 3 days, if tolerated, add morning dose of 1 capsule (300 mg total) for 3 days and if tolerated add afternoon dose of 1 capsule (300 mg ) .  NOTE: increase doses slowly to minimize side effects. (Patient not  taking: Reported on 2025) 90 capsule 0    Insulin Pen Needle (PEN NEEDLES) 32G X 4 MM Does not apply Misc 1 each daily. 90 each 0    atorvastatin 10 MG Oral Tab Take 1 tablet (10 mg total) by mouth nightly. (Patient not taking: Reported on 2025) 90 tablet 3    furosemide 20 MG Oral Tab Take  1 tab po daily (Patient not taking: Reported on 2025) 90 tablet 0      Past Medical History:    Amenorrhea    Anxiety    Diabetes (HCC)    High blood pressure    High cholesterol    Hypertension    Mixed hyperlipidemia    Obesity    Scoliosis    Shortness of breath    Sleep apnea    needs new CPAP machine    Varicose vein      Past Surgical History:   Procedure Laterality Date    Colonoscopy N/A 2024    ; colon polyps,hemorrhoids    Colonoscopy N/A 2024    Procedure: COLONOSCOPY;  Surgeon: Ghassan Mayer MD;  Location: [facility] ENDOSCOPY    Egd N/A 2024    ; esophagitis,Hiatal hernia      1989    Upper gi endoscopy,exam        History reviewed. No pertinent family history.   Social History     Socioeconomic History    Marital status:    Tobacco Use    Smoking status: Never     Passive exposure: Never    Smokeless tobacco: Never   Vaping Use    Vaping status: Never Used   Substance and Sexual Activity    Alcohol use: Not Currently    Drug use: Never    Sexual activity: Yes   Other Topics Concern    Caffeine Concern No    Exercise No    Seat Belt No    Special Diet No    Stress Concern No    Weight Concern Yes    Grew up on a farm Yes    History of tanning Yes    Outdoor occupation Yes    Breast feeding No    Reaction to local anesthetic No    Pt has a pacemaker No    Pt has a defibrillator No           REVIEW OF SYSTEMS:   Today reviewed systens as documented below  GENERAL HEALTH: feels well otherwise  SKIN: Refer to exam below  RESPIRATORY: denies shortness of breath with exertion  CARDIOVASCULAR: denies chest pain on exertion  GI: denies abdominal pain and  denies heartburn  NEURO: denies headaches    EXAM:   There were no vitals taken for this visit.  GENERAL: well developed, well nourished, in no apparent distress  EXTREMITIES:   Since I am not doing surgery I did not perform an exam today but explained to her that I will refer her to Dr. Aristides Joe for surgical consultation.  ASSESSMENT AND PLAN:   Diagnoses and all orders for this visit:    Metatarsus primus varus of left foot    Hallux valgus of left foot    Foot pain, bilateral    Hallux valgus with bunions of left foot    Hallux valgus of right foot    Metatarsus primus varus of right foot        Plan: Today did not do an exam and I referred her to Dr. Aristides Joe therefore there was no charge for this visit    The patient indicates understanding of these issues and agrees to the plan.    Nikolay Ochoa DPM

## 2025-01-22 NOTE — TELEPHONE ENCOUNTER
Action Requested: Summary for Provider     []  Critical Lab, Recommendations Needed  [] Need Additional Advice  []   FYI    []   Need Orders  [] Need Medications Sent to Pharmacy  []  Other     SUMMARY: Patient calling with family on the line.  conference in 0 [place] [id number]    Patient having itching all over the body, waking her at night. She did stop ABT for H pylori a few days ago, no rash. Also using new body gel soap. Advised to go back to old soap to see if helps.   Patient has no facial swelling or lip swelling, says tongue feels dry.   Er earning signs reviewed, states understanding. Agreeable to go    Patient would like some labs tested, she is asking for liver and pancrease to be testing. Denies any abd pain or belly pain. Skin has grayish spots but reports this is on elbows and joints. Denies skin being yellowish in color.   Future Appointments   Date Time Provider Department Center   1/23/2025  8:15 AM Chantel De La Rosa DO [facility] [place] [facility] [place]   1/23/2025  8:40 AM Chata Leigh APRN [id number] [place]   1/31/2025  9:00 AM Jones Joe DPM [id number]   1/31/2025  2:20 PM Alannah Jackson PA-C [id number] [address]   2/11/2025  7:15 AM Eyad Gonzalez MD [facility] [place]   3/8/2025  8:00 AM [facility] SCHEDULED RESOURCE [facility] [place]       Reason for call: No chief complaint on file.  Onset: last few days         Reason for Disposition   MODERATE-SEVERE widespread itching (i.e., interferes with sleep, normal activities or school) and not improved after 24 hours of itching Care Advice    Protocols used: Itching - Widespread-A-OH

## 2025-01-23 ENCOUNTER — OFFICE VISIT (OUTPATIENT)
Dept: PHYSICAL MEDICINE AND REHAB | Facility: CLINIC | Age: 60
End: 2025-01-23
Payer: MEDICAID

## 2025-01-23 ENCOUNTER — HOSPITAL ENCOUNTER (OUTPATIENT)
Dept: GENERAL RADIOLOGY | Age: 60
Discharge: HOME | End: 2025-01-23
Attending: PHYSICAL MEDICINE & REHABILITATION
Payer: MEDICAID

## 2025-01-23 ENCOUNTER — TELEPHONE (OUTPATIENT)
Dept: PHYSICAL MEDICINE AND REHAB | Facility: CLINIC | Age: 60
End: 2025-01-23

## 2025-01-23 DIAGNOSIS — M79.18 CERVICAL MYOFASCIAL PAIN SYNDROME: ICD-10-CM

## 2025-01-23 DIAGNOSIS — M51.26 HNP (HERNIATED NUCLEUS PULPOSUS), LUMBAR: ICD-10-CM

## 2025-01-23 DIAGNOSIS — E66.01 MORBID OBESITY WITH BMI OF 50.0-59.9, ADULT (HCC): ICD-10-CM

## 2025-01-23 DIAGNOSIS — M54.16 LEFT LUMBAR RADICULOPATHY: Primary | ICD-10-CM

## 2025-01-23 DIAGNOSIS — M47.816 LUMBAR FACET ARTHROPATHY: ICD-10-CM

## 2025-01-23 DIAGNOSIS — R10.32 LEFT GROIN PAIN: ICD-10-CM

## 2025-01-23 PROCEDURE — 99214 OFFICE O/P EST MOD 30 MIN: CPT | Performed by: PHYSICAL MEDICINE & REHABILITATION

## 2025-01-23 PROCEDURE — 73502 X-RAY EXAM HIP UNI 2-3 VIEWS: CPT | Performed by: PHYSICAL MEDICINE & REHABILITATION

## 2025-01-23 RX ORDER — PREGABALIN 75 MG/1
75 CAPSULE ORAL 2 TIMES DAILY
Qty: 60 CAPSULE | Refills: 0 | Status: SHIPPED | OUTPATIENT
Start: 2025-01-23

## 2025-01-23 NOTE — PROGRESS NOTES
The following individual(s) verbally consented to be recorded using ambient AI listening technology and understand that they can each withdraw their consent to this listening technology at any point by asking the clinician to turn off or pause the recording.

## 2025-01-23 NOTE — H&P (VIEW-ONLY)
[facility]  OFFICE FOLLOW UP EVALUATION      HISTORY OF PRESENT ILLNESS:     Chief Complaint   Patient presents with    Follow - Up     LOV: 11/21/2024. Pt c/o pain to the lower back, radiating down medial LEFT leg. Cramping. Denies N/T. Pt c/o weakness to LLE. Denies RX. CPL: 10/10.       History of Present Illness  A 59-year-old patient presents for follow-up evaluation of low back pain. She was last seen in November 2024 for neck and low back pain and received lumbar facet joint injections in October 2024. The patient reports persistent pain in the lower back, left inner thigh, and left shin, which is worse when sitting. She describes the pain as sharp and also experiences a pulling sensation in her left leg and cramps. The patient has been taking meloxicam for the pain, which has provided some relief. However, she reports that Tylenol does not help and she does not have any other pain medications. The patient also reports a pulsation in her right buttock and a sensation of ants crawling over her body while sitting. She expresses concern about her ongoing pain and questions whether she will suffer for the rest of her life.      PHYSICAL EXAM:   There were no vitals taken for this visit.    Gait  Able to toe walk and heel walk without any difficulty     LUMBAR SPINE:  Inspection: no erythema, swelling, or obvious deformity.  Their iliac crest and shoulder heights are symmetrical.     Palpation: Non tender to palpation of the spinous process.   ROM: FAROM but pain with end range of flexion and extension.  Strength: 5/5 in bilateral lower extremities  Sensation: Intact to light touch in all dermatomes of the lower extremities  Reflexes: 2/4 at L4 and S1  Facet Loading: Negative bilateral lower lumbar facet joints  Straight leg raise: negative for radicular pain symptoms  Slump test: negative for pain symptoms for radicular pain symptoms     Full active range of motion  of the cervical spine but pain reproduced with forward flexion and extension.  Mild tenderness to palpation of the cervical thoracic junction.  Strength is 5 out of 5 in bilateral upper extremities, sensation intact to light touch.  Reflexes +2 and symmetric at C5 and C6 bilaterally.  Spurling's negative.  Negative Radha's    IMAGING:     MRI lumbar spine completed on 10/3/2024 was personally reviewed which is notable for mild degenerative changes at L4-5 there is a broad-based degenerative disc bulge with moderate bilateral facet arthropathy and mild bilateral neuroforaminal narrowing. There is moderate broad-based degenerative disc bulge at L5-S1 eccentric to the left posterior lateral aspect of the disc space. There is moderate left neuroforaminal narrowing. The facet joint degenerative changes as well.     All imaging results were reviewed and discussed with patient.      ASSESSMENT/PLAN:     1. Bilateral lumbar radiculopathy    2. Morbid obesity with BMI of 50.0-59.9, adult (HCC)    3. Lumbar facet arthropathy    4. HNP (herniated nucleus pulposus), lumbar        Assessment & Plan  Lumbar Disc Herniation  Severe axial lumbar spine pain with radiation to the left inner thigh and shin. Pain is sharp and exacerbated by sitting. Prior lumbar facet joint injections in October 2024 provided 80% relief for two months.  -Plan for left L5 and S1 transforaminal epidural steroid injections pending insurance approval.  -Prescribe Lyrica 75mg BID for neuropathic pain.  -Discontinue gabapentin as she is not currently taking    Osteoarthritis of the Hip  Suspected based on inner thigh pain.  -Order hip X-ray today.    Obesity  Contributing to musculoskeletal pain. Patient has seen multiple providers without success.  -Refer to weight loss specialist.      The patient verbalized understanding with the plan and was in agreement. All questions/concerns were addressed and there were no barriers to learning.  Please note Dragon  dictation software was used to dictate this note and may result in inadvertent typos.    Chantel De La Rosa DO, FAAPMR & CAQSM  Physical Medicine and Rehabilitation  Sports and Spine Medicine    PAST MEDICAL HISTORY:     Past Medical History:    Amenorrhea    Anxiety    Diabetes (HCC)    High blood pressure    High cholesterol    Hypertension    Mixed hyperlipidemia    Obesity    Scoliosis    Shortness of breath    Sleep apnea    needs new CPAP machine    Varicose vein         PAST SURGICAL HISTORY:     Past Surgical History:   Procedure Laterality Date    Colonoscopy N/A 2024    ; colon polyps,hemorrhoids    Colonoscopy N/A 2024    Procedure: COLONOSCOPY;  Surgeon: Ghassan Mayer MD;  Location: [facility] ENDOSCOPY    Egd N/A 2024    ; esophagitis,Hiatal hernia      1989    Upper gi endoscopy,exam           CURRENT MEDICATIONS:     Current Outpatient Medications   Medication Sig Dispense Refill    AMOXICILLIN OR Take 2 500mg tablets by mouth twice a day      clarithromycin 500 MG Oral Tab Take 1 tablet (500 mg total) by mouth 2 (two) times daily.      triamcinolone 0.1 % External Cream Apply topically 2 (two) times daily as needed. 15 g 1    busPIRone HCl 7.5 MG Oral Tab Take 1 tablet (7.5 mg total) by mouth 3 (three) times daily as needed. 90 tablet 1    Omeprazole 40 MG Oral Capsule Delayed Release Take 1 capsule (40 mg total) by mouth 2 (two) times daily before meals. 180 capsule 0    Blood Pressure Monitoring Does not apply Kit Check BP twice daily 1 kit 0    semaglutide (OZEMPIC, 0.25 OR 0.5 MG/DOSE,) 2 MG/3ML Subcutaneous Solution Pen-injector Inject 0.25 mg into the skin once a week. (Patient not taking: Reported on 2025) 3 mL 0    gabapentin 300 MG Oral Cap Take 1 capsule (300 mg total) by mouth As Directed. Take 1 capsule (300 mg total) nightly for 3 days, if tolerated, add morning dose of 1 capsule (300 mg total) for 3 days and if tolerated add afternoon dose of  1 capsule (300 mg ) .  NOTE: increase doses slowly to minimize side effects. (Patient not taking: Reported on 1/22/2025) 90 capsule 0    Insulin Pen Needle (PEN NEEDLES) 32G X 4 MM Does not apply Misc 1 each daily. 90 each 0    valsartan 160 MG Oral Tab Take 1 tablet (160 mg total) by mouth daily. 90 tablet 3    ketoconazole 2 % External Cream Apply to affected area twice daily 30 g 0    metFORMIN  MG Oral Tablet 24 Hr Take 1 tablet (750 mg total) by mouth daily. 90 tablet 2    atorvastatin 10 MG Oral Tab Take 1 tablet (10 mg total) by mouth nightly. (Patient not taking: Reported on 1/22/2025) 90 tablet 3    furosemide 20 MG Oral Tab Take  1 tab po daily (Patient not taking: Reported on 1/22/2025) 90 tablet 0         ALLERGIES:   Allergies[1]      FAMILY HISTORY:   History reviewed. No pertinent family history.       SOCIAL HISTORY:     Social History     Socioeconomic History    Marital status:    Tobacco Use    Smoking status: Never     Passive exposure: Never    Smokeless tobacco: Never   Vaping Use    Vaping status: Never Used   Substance and Sexual Activity    Alcohol use: Not Currently    Drug use: Never    Sexual activity: Yes   Other Topics Concern    Caffeine Concern No    Exercise No    Seat Belt No    Special Diet No    Stress Concern No    Weight Concern Yes    Grew up on a farm Yes    History of tanning Yes    Outdoor occupation Yes    Breast feeding No    Reaction to local anesthetic No    Pt has a pacemaker No    Pt has a defibrillator No     Social Drivers of Health     Food Insecurity: Low Risk  (4/20/2024)    Received from [facility]    Food Insecurity     Have there been times that your food ran out, and you didn't have money to get more?: No     Are there times that you worry that this might happen?: No   Transportation Needs: Low Risk  (4/20/2024)    Received from [facility]    Transportation Needs     Do you have trouble getting  transportation to medical appointments?: No          REVIEW OF SYSTEMS:   A comprehensive 10 point review of systems was completed.  Pertinent positives and negatives noted in the the HPI.      LABS:     Lab Results   Component Value Date     (H) 11/02/2024    A1C 7.0 (H) 11/02/2024     Lab Results   Component Value Date    WBC 8.3 12/30/2024    RBC 4.51 12/30/2024    HGB 12.7 12/30/2024    HCT 38.7 12/30/2024    MCV 85.8 12/30/2024    MCH 28.2 12/30/2024    MCHC 32.8 12/30/2024    RDW 14.5 12/30/2024    .0 12/30/2024     Lab Results   Component Value Date     (H) 12/30/2024    BUN 14 12/30/2024    BUNCREA 16.9 12/30/2024    CREATSERUM 0.83 12/30/2024    ANIONGAP 6 12/30/2024    CA 9.7 12/30/2024    OSMOCALC 294 12/30/2024    ALKPHO 85 12/30/2024    AST 54 (H) 12/30/2024    ALT 79 (H) 12/30/2024    BILT 0.4 12/30/2024    TP 7.4 12/30/2024    ALB 4.6 12/30/2024    GLOBULIN 3.1 11/02/2024     12/30/2024    K 4.0 12/30/2024     12/30/2024    CO2 28.0 12/30/2024     No results found for: \"PTP\", \"PT\", \"INR\"  Lab Results   Component Value Date    VITD 38.1 07/17/2024              [1]   Allergies  Allergen Reactions    Trulicity [Dulaglutide] NAUSEA ONLY    Lactose ITCHING

## 2025-01-23 NOTE — PATIENT INSTRUCTIONS
-Discontinue Gabapentin  -Start Lyrica 75mg twice daily  -My office will call for injection   -Weight loss specialist  -Xray of the hip today  -Follow up for injection

## 2025-01-23 NOTE — PROGRESS NOTES
[facility]  OFFICE FOLLOW UP EVALUATION      HISTORY OF PRESENT ILLNESS:     Chief Complaint   Patient presents with    Follow - Up     LOV: 11/21/2024. Pt c/o pain to the lower back, radiating down medial LEFT leg. Cramping. Denies N/T. Pt c/o weakness to LLE. Denies RX. CPL: 10/10.       History of Present Illness  A 59-year-old patient presents for follow-up evaluation of low back pain. She was last seen in November 2024 for neck and low back pain and received lumbar facet joint injections in October 2024. The patient reports persistent pain in the lower back, left inner thigh, and left shin, which is worse when sitting. She describes the pain as sharp and also experiences a pulling sensation in her left leg and cramps. The patient has been taking meloxicam for the pain, which has provided some relief. However, she reports that Tylenol does not help and she does not have any other pain medications. The patient also reports a pulsation in her right buttock and a sensation of ants crawling over her body while sitting. She expresses concern about her ongoing pain and questions whether she will suffer for the rest of her life.      PHYSICAL EXAM:   There were no vitals taken for this visit.    Gait  Able to toe walk and heel walk without any difficulty     LUMBAR SPINE:  Inspection: no erythema, swelling, or obvious deformity.  Their iliac crest and shoulder heights are symmetrical.     Palpation: Non tender to palpation of the spinous process.   ROM: FAROM but pain with end range of flexion and extension.  Strength: 5/5 in bilateral lower extremities  Sensation: Intact to light touch in all dermatomes of the lower extremities  Reflexes: 2/4 at L4 and S1  Facet Loading: Negative bilateral lower lumbar facet joints  Straight leg raise: negative for radicular pain symptoms  Slump test: negative for pain symptoms for radicular pain symptoms     Full active range of motion  of the cervical spine but pain reproduced with forward flexion and extension.  Mild tenderness to palpation of the cervical thoracic junction.  Strength is 5 out of 5 in bilateral upper extremities, sensation intact to light touch.  Reflexes +2 and symmetric at C5 and C6 bilaterally.  Spurling's negative.  Negative Ardha's    IMAGING:     MRI lumbar spine completed on 10/3/2024 was personally reviewed which is notable for mild degenerative changes at L4-5 there is a broad-based degenerative disc bulge with moderate bilateral facet arthropathy and mild bilateral neuroforaminal narrowing. There is moderate broad-based degenerative disc bulge at L5-S1 eccentric to the left posterior lateral aspect of the disc space. There is moderate left neuroforaminal narrowing. The facet joint degenerative changes as well.     All imaging results were reviewed and discussed with patient.      ASSESSMENT/PLAN:     1. Bilateral lumbar radiculopathy    2. Morbid obesity with BMI of 50.0-59.9, adult (HCC)    3. Lumbar facet arthropathy    4. HNP (herniated nucleus pulposus), lumbar        Assessment & Plan  Lumbar Disc Herniation  Severe axial lumbar spine pain with radiation to the left inner thigh and shin. Pain is sharp and exacerbated by sitting. Prior lumbar facet joint injections in October 2024 provided 80% relief for two months.  -Plan for left L5 and S1 transforaminal epidural steroid injections pending insurance approval.  -Prescribe Lyrica 75mg BID for neuropathic pain.  -Discontinue gabapentin as she is not currently taking    Osteoarthritis of the Hip  Suspected based on inner thigh pain.  -Order hip X-ray today.    Obesity  Contributing to musculoskeletal pain. Patient has seen multiple providers without success.  -Refer to weight loss specialist.      The patient verbalized understanding with the plan and was in agreement. All questions/concerns were addressed and there were no barriers to learning.  Please note Dragon  dictation software was used to dictate this note and may result in inadvertent typos.    Chantel De La Rosa DO, FAAPMR & CAQSM  Physical Medicine and Rehabilitation  Sports and Spine Medicine    PAST MEDICAL HISTORY:     Past Medical History:    Amenorrhea    Anxiety    Diabetes (HCC)    High blood pressure    High cholesterol    Hypertension    Mixed hyperlipidemia    Obesity    Scoliosis    Shortness of breath    Sleep apnea    needs new CPAP machine    Varicose vein         PAST SURGICAL HISTORY:     Past Surgical History:   Procedure Laterality Date    Colonoscopy N/A 2024    ; colon polyps,hemorrhoids    Colonoscopy N/A 2024    Procedure: COLONOSCOPY;  Surgeon: Ghassan Mayer MD;  Location: [facility] ENDOSCOPY    Egd N/A 2024    ; esophagitis,Hiatal hernia      1989    Upper gi endoscopy,exam           CURRENT MEDICATIONS:     Current Outpatient Medications   Medication Sig Dispense Refill    AMOXICILLIN OR Take 2 500mg tablets by mouth twice a day      clarithromycin 500 MG Oral Tab Take 1 tablet (500 mg total) by mouth 2 (two) times daily.      triamcinolone 0.1 % External Cream Apply topically 2 (two) times daily as needed. 15 g 1    busPIRone HCl 7.5 MG Oral Tab Take 1 tablet (7.5 mg total) by mouth 3 (three) times daily as needed. 90 tablet 1    Omeprazole 40 MG Oral Capsule Delayed Release Take 1 capsule (40 mg total) by mouth 2 (two) times daily before meals. 180 capsule 0    Blood Pressure Monitoring Does not apply Kit Check BP twice daily 1 kit 0    semaglutide (OZEMPIC, 0.25 OR 0.5 MG/DOSE,) 2 MG/3ML Subcutaneous Solution Pen-injector Inject 0.25 mg into the skin once a week. (Patient not taking: Reported on 2025) 3 mL 0    gabapentin 300 MG Oral Cap Take 1 capsule (300 mg total) by mouth As Directed. Take 1 capsule (300 mg total) nightly for 3 days, if tolerated, add morning dose of 1 capsule (300 mg total) for 3 days and if tolerated add afternoon dose of  1 capsule (300 mg ) .  NOTE: increase doses slowly to minimize side effects. (Patient not taking: Reported on 1/22/2025) 90 capsule 0    Insulin Pen Needle (PEN NEEDLES) 32G X 4 MM Does not apply Misc 1 each daily. 90 each 0    valsartan 160 MG Oral Tab Take 1 tablet (160 mg total) by mouth daily. 90 tablet 3    ketoconazole 2 % External Cream Apply to affected area twice daily 30 g 0    metFORMIN  MG Oral Tablet 24 Hr Take 1 tablet (750 mg total) by mouth daily. 90 tablet 2    atorvastatin 10 MG Oral Tab Take 1 tablet (10 mg total) by mouth nightly. (Patient not taking: Reported on 1/22/2025) 90 tablet 3    furosemide 20 MG Oral Tab Take  1 tab po daily (Patient not taking: Reported on 1/22/2025) 90 tablet 0         ALLERGIES:   Allergies[1]      FAMILY HISTORY:   History reviewed. No pertinent family history.       SOCIAL HISTORY:     Social History     Socioeconomic History    Marital status:    Tobacco Use    Smoking status: Never     Passive exposure: Never    Smokeless tobacco: Never   Vaping Use    Vaping status: Never Used   Substance and Sexual Activity    Alcohol use: Not Currently    Drug use: Never    Sexual activity: Yes   Other Topics Concern    Caffeine Concern No    Exercise No    Seat Belt No    Special Diet No    Stress Concern No    Weight Concern Yes    Grew up on a farm Yes    History of tanning Yes    Outdoor occupation Yes    Breast feeding No    Reaction to local anesthetic No    Pt has a pacemaker No    Pt has a defibrillator No     Social Drivers of Health     Food Insecurity: Low Risk  (4/20/2024)    Received from [facility]    Food Insecurity     Have there been times that your food ran out, and you didn't have money to get more?: No     Are there times that you worry that this might happen?: No   Transportation Needs: Low Risk  (4/20/2024)    Received from [facility]    Transportation Needs     Do you have trouble getting  transportation to medical appointments?: No          REVIEW OF SYSTEMS:   A comprehensive 10 point review of systems was completed.  Pertinent positives and negatives noted in the the HPI.      LABS:     Lab Results   Component Value Date     (H) 11/02/2024    A1C 7.0 (H) 11/02/2024     Lab Results   Component Value Date    WBC 8.3 12/30/2024    RBC 4.51 12/30/2024    HGB 12.7 12/30/2024    HCT 38.7 12/30/2024    MCV 85.8 12/30/2024    MCH 28.2 12/30/2024    MCHC 32.8 12/30/2024    RDW 14.5 12/30/2024    .0 12/30/2024     Lab Results   Component Value Date     (H) 12/30/2024    BUN 14 12/30/2024    BUNCREA 16.9 12/30/2024    CREATSERUM 0.83 12/30/2024    ANIONGAP 6 12/30/2024    CA 9.7 12/30/2024    OSMOCALC 294 12/30/2024    ALKPHO 85 12/30/2024    AST 54 (H) 12/30/2024    ALT 79 (H) 12/30/2024    BILT 0.4 12/30/2024    TP 7.4 12/30/2024    ALB 4.6 12/30/2024    GLOBULIN 3.1 11/02/2024     12/30/2024    K 4.0 12/30/2024     12/30/2024    CO2 28.0 12/30/2024     No results found for: \"PTP\", \"PT\", \"INR\"  Lab Results   Component Value Date    VITD 38.1 07/17/2024              [1]   Allergies  Allergen Reactions    Trulicity [Dulaglutide] NAUSEA ONLY    Lactose ITCHING

## 2025-01-23 NOTE — TELEPHONE ENCOUNTER
Initiated authorization for Left L5 and S1 Transforaminal Epidural Steroid Injection under fluoroscopy guidance. CPT/HCPCS 71259, 75608 dx:M54.16 to be done at [facility] with [employer]    Status: Approved  Reference/Authorization # [id number]  Valid: 1/23/25-3/24/25    Authorization is not a guarantee of payment and may be subject to review once claim is submitted-Covered Benefit.

## 2025-01-30 ENCOUNTER — TELEPHONE (OUTPATIENT)
Dept: PODIATRY CLINIC | Facility: CLINIC | Age: 60
End: 2025-01-30

## 2025-01-30 ENCOUNTER — TELEPHONE (OUTPATIENT)
Dept: INTERNAL MEDICINE CLINIC | Facility: CLINIC | Age: 60
End: 2025-01-30

## 2025-01-30 NOTE — TELEPHONE ENCOUNTER
S/w Daughter and rebooked her for 2/13/25 at 10:15 with Dr Joe. Cancelled 1/31/25 appointment as patient is currently in hospital

## 2025-01-30 NOTE — TELEPHONE ENCOUNTER
Patient's daughter Melvina states patient was admitted to [facility] on 1/28. Patient asking to reschedule 1/31 appointment and states a nurse helped her schedule the first time. Patient's appointment is for bunions. Please call.    Melvina does not require interpretor.

## 2025-01-30 NOTE — TELEPHONE ENCOUNTER
Melvina(on HIPAA) indicated that patient was having back pain and fever so they called 911 on 1/28/2025 and an ambulance took her to [facility] in [place], since they live in [place]. Patient is admitted and on oxygen. Diagnosed with influenza A. Patient still having horrible pain 20/10 but states they are giving patient pain medication but they are not doing any testing for the back pain. Melvina concerned and wanted to get Dr Multani's advice.   Advised daughter that the hospital would address the most acute symptoms first like the fever and her breathing, but they are giving her pain medication. Also advised Melvina that with a fever or respiratory issues can have body aches/back pain as well, which they are treating. Advised Melvina to speak to the nurse or doctor taking care of patient if she does have concerns. Melvina still wanted message sent to doctor.    Admission can be seen in care everywhere.

## 2025-01-31 NOTE — TELEPHONE ENCOUNTER
Spoke to daughter-in-law, advised that I reviewed records available in [facility], patient had CT scan of the chest and abdomen done no acute abnormality found continues to receive high-dose of oxygen, lab seems stable with elevated liver enzymes, mild anemia.  Patient receiving pain medication, advised that pain medicine makes patient sleepy advised to encourage patient to sit up and spend time sitting in upright position and not take pain medication especially intravenously around-the-clock to avoid respiratory suppression, needs to do breathing exercises l, advised not being overly sedated with pain management is not advisable with sleep apnea diagnosis.  Advised Jackie to speak to the doctor who takes care of the patient nurse nurse for update, when they visit patient at the hospital

## 2025-02-03 ENCOUNTER — TELEPHONE (OUTPATIENT)
Dept: INTERNAL MEDICINE CLINIC | Facility: CLINIC | Age: 60
End: 2025-02-03

## 2025-02-03 NOTE — TELEPHONE ENCOUNTER
Melvina(on HIPAA) indicated that patient was discharged from the hospital yesterday and needed to follow up with Dr Multani within 7 days. Appointment made for 2/7/2025 at 4:40pm with Dr Multani in [place].

## 2025-02-03 NOTE — TELEPHONE ENCOUNTER
Jesika from [facility] called the relay to Dr. Multani that Jesika would need a verbal confirmation that Dr. Multani is wiling to follow this patient into Home Health. Please call for any questions.

## 2025-02-06 ENCOUNTER — TELEPHONE (OUTPATIENT)
Dept: INTERNAL MEDICINE CLINIC | Facility: CLINIC | Age: 60
End: 2025-02-06

## 2025-02-06 NOTE — TELEPHONE ENCOUNTER
Voice mail box full - call regarding canceling 2/7/2025 appt till further notice - Not sure when Dr. Multani will be back In office. If needed pt can schedule with Chata Leigh

## 2025-02-06 NOTE — TELEPHONE ENCOUNTER
Dr. Multani - [facility] access hospital - patient already has appointment tomorrow at 4:40 PM with you.    Julia, physical therapist with [facility] called.    Patient was seen yesterday.     Patient is currently on continuous oxygen at 3L/min - was influenza A (+) on 1/28/25.   SPO2 97-98% at rest.     Julia tested patient (after walking exercises completed) on room air, patient's SPO2 stayed in the range of 93-95%.     Patient wants to titrate down on the oxygen, Julia asking if verbal order can be provided to lower patient to 2L/min.    RN informed Julia that Dr. uMltani is not in the office today.     Patient has appointment with Dr. Multani tomorrow at 4:40 PM.     Physical therapy will see patient once per week for 3-4 visits.     Patient complains of back pain and requests massage, but that is not in the physical therapist's scope of practice.     Communication is difficult, both patient and daughter do not speak English, telephone  for Icelandic is needed.     Julia's call back number is [phone] for any questions.

## 2025-02-07 ENCOUNTER — NURSE TRIAGE (OUTPATIENT)
Dept: INTERNAL MEDICINE CLINIC | Facility: CLINIC | Age: 60
End: 2025-02-07

## 2025-02-07 NOTE — TELEPHONE ENCOUNTER
Daughter Melvina call and she stated that patient inform her yesterday that when she coughs up mucus there is blood and also when patient is blowing her nose there is blood. Daughter stated that patient was in the hospital due to the Flu and was going to follow- up with Dr. Multani today but the appointment was cancelled. Per daughter Patient cough is a little better and the blood in the mucus is something new.  Per daughter patient does not have chest pain or shortness of breath. She is on O2. Inform daughter we have no available appointment for today but patient should be evaluated for the blood that is being seen in her mucus. Daughter inform to take patient to [facility] for a evaluation. Daughter will inform patient.     Reason for Disposition   Coughing up eden-colored sputum     Patient was discharge from hospital due to Flu  Has blood when she blows her nose  Has blood when she coughs up phlegm  Patient is on O2    Protocols used: Coughing Up Blood-A-OH

## 2025-02-07 NOTE — TELEPHONE ENCOUNTER
Spoke to daughter-in-law Melvina advised to make appointment for follow-up with any available provider.  I am out of the office for several weeks.  Make appointment with the pulmonologist they have a number provided upon discharge from [facility].

## 2025-02-12 ENCOUNTER — OFFICE VISIT (OUTPATIENT)
Dept: INTERNAL MEDICINE CLINIC | Facility: CLINIC | Age: 60
End: 2025-02-12
Payer: MEDICAID

## 2025-02-12 ENCOUNTER — TELEPHONE (OUTPATIENT)
Dept: INTERNAL MEDICINE CLINIC | Facility: CLINIC | Age: 60
End: 2025-02-12

## 2025-02-12 ENCOUNTER — LAB ENCOUNTER (OUTPATIENT)
Dept: LAB | Age: 60
End: 2025-02-12
Attending: INTERNAL MEDICINE
Payer: MEDICAID

## 2025-02-12 VITALS
HEIGHT: 64 IN | OXYGEN SATURATION: 96 % | SYSTOLIC BLOOD PRESSURE: 133 MMHG | HEART RATE: 92 BPM | BODY MASS INDEX: 50.02 KG/M2 | WEIGHT: 293 LBS | RESPIRATION RATE: 18 BRPM | TEMPERATURE: 97 F | DIASTOLIC BLOOD PRESSURE: 84 MMHG

## 2025-02-12 DIAGNOSIS — H93.8X3 IRRITATION OF BOTH EARS: ICD-10-CM

## 2025-02-12 DIAGNOSIS — M54.16 LUMBAR RADICULOPATHY: Primary | ICD-10-CM

## 2025-02-12 DIAGNOSIS — I50.32 CHRONIC DIASTOLIC CONGESTIVE HEART FAILURE (HCC): ICD-10-CM

## 2025-02-12 DIAGNOSIS — J10.1 INFLUENZA A: ICD-10-CM

## 2025-02-12 DIAGNOSIS — G47.33 OSA (OBSTRUCTIVE SLEEP APNEA): ICD-10-CM

## 2025-02-12 DIAGNOSIS — J96.11 HYPOXEMIC RESPIRATORY FAILURE, CHRONIC (HCC): ICD-10-CM

## 2025-02-12 LAB
ALBUMIN SERPL-MCNC: 4.5 G/DL (ref 3.2–4.8)
ALBUMIN/GLOB SERPL: 1.5 {RATIO} (ref 1–2)
ALP LIVER SERPL-CCNC: 79 U/L
ALT SERPL-CCNC: 56 U/L
ANION GAP SERPL CALC-SCNC: 9 MMOL/L (ref 0–18)
AST SERPL-CCNC: 40 U/L (ref ?–34)
BILIRUB SERPL-MCNC: 0.4 MG/DL (ref 0.2–1.1)
BUN BLD-MCNC: 11 MG/DL (ref 9–23)
BUN/CREAT SERPL: 15.5 (ref 10–20)
CALCIUM BLD-MCNC: 9.1 MG/DL (ref 8.7–10.4)
CHLORIDE SERPL-SCNC: 105 MMOL/L (ref 98–112)
CO2 SERPL-SCNC: 29 MMOL/L (ref 21–32)
CREAT BLD-MCNC: 0.71 MG/DL
EGFRCR SERPLBLD CKD-EPI 2021: 97 ML/MIN/1.73M2 (ref 60–?)
FASTING STATUS PATIENT QL REPORTED: NO
GLOBULIN PLAS-MCNC: 3.1 G/DL (ref 2–3.5)
GLUCOSE BLD-MCNC: 121 MG/DL (ref 70–99)
OSMOLALITY SERPL CALC.SUM OF ELEC: 297 MOSM/KG (ref 275–295)
POTASSIUM SERPL-SCNC: 4.3 MMOL/L (ref 3.5–5.1)
PROT SERPL-MCNC: 7.6 G/DL (ref 5.7–8.2)
SODIUM SERPL-SCNC: 143 MMOL/L (ref 136–145)

## 2025-02-12 PROCEDURE — 36415 COLL VENOUS BLD VENIPUNCTURE: CPT

## 2025-02-12 PROCEDURE — 80053 COMPREHEN METABOLIC PANEL: CPT

## 2025-02-12 PROCEDURE — 99214 OFFICE O/P EST MOD 30 MIN: CPT | Performed by: INTERNAL MEDICINE

## 2025-02-12 RX ORDER — TRAMADOL HYDROCHLORIDE 50 MG/1
50 TABLET ORAL EVERY 12 HOURS PRN
Qty: 30 TABLET | Refills: 0 | Status: SHIPPED | OUTPATIENT
Start: 2025-02-12

## 2025-02-12 RX ORDER — LIDOCAINE HYDROCHLORIDE 20 MG/ML
5 SOLUTION OROPHARYNGEAL
Qty: 100 ML | Refills: 0 | Status: SHIPPED | OUTPATIENT
Start: 2025-02-12

## 2025-02-12 RX ORDER — HYDROCORTISONE AND ACETIC ACID 20.75; 10.375 MG/ML; MG/ML
3 SOLUTION AURICULAR (OTIC) 2 TIMES DAILY
Qty: 10 ML | Refills: 0 | Status: SHIPPED | OUTPATIENT
Start: 2025-02-12 | End: 2025-02-17

## 2025-02-12 NOTE — PROGRESS NOTES
Judi White is a 60 year old female.  Chief Complaint   Patient presents with    ER F/U     HPI:   Patient presented today for hospital discharge follow-up visit.  Daughter present with the patient and has a daughter who is translating as. Patient was admitted to [facility] for shortness of breath.  She was found to be positive for influenza A and was started on Tamiflu, required oxygen while inpatient and also required BiPAP in the beginning.  CT PE scan was negative.  She was started on IV Lasix for pulmonary edema with good response which was then later on converted to oral Lasix on discharge.  Echo showed diastolic congestive heart failure EF 65%.  She has been on Lasix from before which she takes as needed.   She states that her respiratory status has improved, she is not short of breath.  She has not been requiring any oxygen at home and does not use it anymore.  Complains of some burning sensation on her tongue which has been ongoing for a few days.  Daughter also states that patient has been having worsening low back pain.  While in the hospital she had a CT scan of the lumbar area performed which showed degenerative joint disease.  She does see Dr. De La Rosa from physiatry but her appointment is 1 month away.  She takes pregabalin for low back pain but states that it is not helping her anymore.  She has also noticed some itching in bilateral ears.  No pain  She states before when she used to take furosem[place] from [place] it made her pee a lot but now it is not doing that.  She thinks this dose of Lasix is not working.  But patient also does not have any bilateral lower extremity edema or shortness of breath on exam.  She states she was being seen by a sleep specialist and a CPAP was prescribed but she does not have it yet.  Her chart from [facility] reviewed      Current Outpatient Medications   Medication Sig Dispense Refill    traMADol 50 MG Oral Tab Take 1 tablet (50 mg total) by  mouth every 12 (twelve) hours as needed for Pain. 30 tablet 0    Hydrocortisone-Acetic Acid 1-2 % Otic Solution Place 3 drops into both ears 2 (two) times daily for 5 days. 10 mL 0    Lidocaine Viscous HCl 2 % Mouth/Throat Solution Take 5 mL by mouth every 3 (three) hours as needed for Pain. 100 mL 0    pregabalin (LYRICA) 75 MG Oral Cap Take 1 capsule (75 mg total) by mouth 2 (two) times daily. 60 capsule 0    triamcinolone 0.1 % External Cream Apply topically 2 (two) times daily as needed. 15 g 1    busPIRone HCl 7.5 MG Oral Tab Take 1 tablet (7.5 mg total) by mouth 3 (three) times daily as needed. 90 tablet 1    Omeprazole 40 MG Oral Capsule Delayed Release Take 1 capsule (40 mg total) by mouth 2 (two) times daily before meals. 180 capsule 0    valsartan 160 MG Oral Tab Take 1 tablet (160 mg total) by mouth daily. 90 tablet 3    ketoconazole 2 % External Cream Apply to affected area twice daily 30 g 0    metFORMIN  MG Oral Tablet 24 Hr Take 1 tablet (750 mg total) by mouth daily. 90 tablet 2    atorvastatin 10 MG Oral Tab Take 1 tablet (10 mg total) by mouth nightly. 90 tablet 3    AMOXICILLIN OR Take 2 500mg tablets by mouth twice a day      semaglutide (OZEMPIC, 0.25 OR 0.5 MG/DOSE,) 2 MG/3ML Subcutaneous Solution Pen-injector Inject 0.25 mg into the skin once a week. (Patient not taking: Reported on 12/30/2024) 3 mL 0    Insulin Pen Needle (PEN NEEDLES) 32G X 4 MM Does not apply Misc 1 each daily. 90 each 0    furosemide 20 MG Oral Tab Take  1 tab po daily (Patient not taking: Reported on 12/30/2024) 90 tablet 0      Past Medical History:    Amenorrhea    Anxiety    Diabetes (HCC)    High blood pressure    High cholesterol    Hypertension    Mixed hyperlipidemia    Obesity    Scoliosis    Shortness of breath    Sleep apnea    needs new CPAP machine    Varicose vein      Past Surgical History:   Procedure Laterality Date    Colonoscopy N/A 12/04/2024    ; colon polyps,hemorrhoids    Colonoscopy  N/A 2024    Procedure: COLONOSCOPY;  Surgeon: Ghassan Mayer MD;  Location: [facility] ENDOSCOPY    Egd N/A 2024    ; esophagitis,Hiatal hernia      1989    Upper gi endoscopy,exam        Social History:  Social History     Socioeconomic History    Marital status:    Tobacco Use    Smoking status: Never     Passive exposure: Never    Smokeless tobacco: Never   Vaping Use    Vaping status: Never Used   Substance and Sexual Activity    Alcohol use: Not Currently    Drug use: Never    Sexual activity: Yes   Other Topics Concern    Caffeine Concern No    Exercise No    Seat Belt No    Special Diet No    Stress Concern No    Weight Concern Yes    Grew up on a farm Yes    History of tanning Yes    Outdoor occupation Yes    Breast feeding No    Reaction to local anesthetic No    Pt has a pacemaker No    Pt has a defibrillator No     Social Drivers of Health     Food Insecurity: Low Risk  (2025)    Received from [facility]    Food Insecurity     Have there been times that your food ran out, and you didn't have money to get more?: No     Are there times that you worry that this might happen?: No   Transportation Needs: Low Risk  (2025)    Received from [facility]    Transportation Needs     Do you have trouble getting transportation to medical appointments?: No   Housing Stability: Low Risk  (2025)    Received from [facility]    Housing Stability     Are you worried that your electric, gas, oil, or water might be shut off?: No     Are you concerned about having a safe and reliable place to live?: No      History reviewed. No pertinent family history.   Allergies[1]     REVIEW OF SYSTEMS:   Review of Systems   Review of Systems   Constitutional: Negative for activity change, appetite change and fever.   HENT: Negative for congestion and voice change.    Respiratory: Negative for cough and shortness of  breath.    Cardiovascular: Negative for chest pain.   Gastrointestinal: Negative for abdominal distention, abdominal pain and vomiting.   Genitourinary: Negative for hematuria.   Skin: Negative for wound.   Psychiatric/Behavioral: Negative for behavioral problems.   Wt Readings from Last 5 Encounters:   02/12/25 (!) 325 lb (147.4 kg)   12/30/24 (!) 312 lb (141.5 kg)   12/19/24 (!) 312 lb (141.5 kg)   12/05/24 (!) 307 lb 1.6 oz (139.3 kg)   11/25/24 (!) 308 lb 10.3 oz (140 kg)     Body mass index is 55.79 kg/m².      EXAM:   /84   Pulse 92   Temp 97.4 °F (36.3 °C) (Temporal)   Resp 18   Ht 5' 4\" (1.626 m)   Wt (!) 325 lb (147.4 kg)   SpO2 96%   BMI 55.79 kg/m²   Physical Exam   Patient with obesity  Constitutional:       Appearance: Normal appearance.   HENT:      Head: Normocephalic.  Tongue with superficial ulcer on the dorsal surface.  No other discoloration patches  Both ears with mild irritation.  No congestion.  TMs clear  Eyes:      Conjunctiva/sclera: Conjunctivae normal.   Breast:  Normal bilateral breast exam. No palpable masses or nodules.   No nipples asymmetry or discharge. No skin changes   Cardiovascular:      Rate and Rhythm: Normal rate and regular rhythm.      Heart sounds: Normal heart sounds. No murmur heard.  Pulmonary:      Effort: Pulmonary effort is normal.      Breath sounds: Normal breath sounds. No rhonchi or rales.   Abdominal:      General: Bowel sounds are normal.      Palpations: Abdomen is soft.      Tenderness: There is no abdominal tenderness.   Musculoskeletal:      Cervical back: Neck supple.      Right lower leg: No edema.      Left lower leg: No edema.   Skin:     General: Skin is warm and dry.   Neurological:      General: No focal deficit present.      Mental Status: He is alert and oriented to person, place, and time. Mental status is at baseline.   Psychiatric:         Mood and Affect: Mood normal.         Behavior: Behavior normal.       ASSESSMENT AND PLAN:      Assessment & Plan  Lumbar radiculopathy  -CT scan done at the hospital reviewed with patient and daughter  It shows mild central and left paracentral disc bulge at L5-S1.  Minimal diffuse disc bulge at L4-5  No spinal stenosis or acute bony abnormality  Patient will benefit from input from neurosurgery  Referral provided  Can take tramadol as needed for pain  Orders:    traMADol 50 MG Oral Tab; Take 1 tablet (50 mg total) by mouth every 12 (twelve) hours as needed for Pain.    NEUROSURGERY - INTERNAL    Chronic diastolic congestive heart failure (HCC)  -Will refer her to cardiology  -Take Lasix only as needed.  Given she is also on valsartan she does not need extra potassium  -Will check CMP today  Orders:    [facility] CARDIOLOGY EXTERNAL    Comp Metabolic Panel (14); Future    Hypoxemic respiratory failure, chronic (HCC)  -Pulmonary referral provided for follow-up  -She has weaned off of oxygen  -Advised patient to call the oxygen company to discontinue oxygen  -Pulse ox 96% in the office today  Orders:    Pulmonary Referral - [place])    Irritation of both ears    Orders:    Hydrocortisone-Acetic Acid 1-2 % Otic Solution; Place 3 drops into both ears 2 (two) times daily for 5 days.    Influenza A  Status post Tamiflu-       EVANGELIST (obstructive sleep apnea)  -Follow-up with the sleep specialist  -She will benefit from CPAP       the patient indicates understanding of these issues and agrees to the plan.  Follow up in 1 month     Ashleigh Herrera MD     This note was created by Cartesian voice recognition. Errors in content may be related to improper recognition by the system; efforts to review and correct have been done but errors may still exist. Please be advised the primary purpose of this note is for me to communicate medical care. Standard sentence structure is not always used. Medical terminology and medical abbreviations may be used. There may be grammatical, typographical, and automated  fill ins that may have errors missed in proofreading.          [1]   Allergies  Allergen Reactions    Trulicity [Dulaglutide] NAUSEA ONLY    Lactose ITCHING

## 2025-02-13 ENCOUNTER — TELEPHONE (OUTPATIENT)
Facility: CLINIC | Age: 60
End: 2025-02-13

## 2025-02-13 NOTE — TELEPHONE ENCOUNTER
Dr. Herrera,     HYDROCORTISONE-ACETIC ACID 1-2% SOLUTION DENIED.    Please advise on alternatives or treatment.  Thank you,  Jennie SHELTON MA

## 2025-02-14 ENCOUNTER — OFFICE VISIT (OUTPATIENT)
Dept: PODIATRY CLINIC | Facility: CLINIC | Age: 60
End: 2025-02-14
Payer: MEDICAID

## 2025-02-14 DIAGNOSIS — M79.672 LEFT FOOT PAIN: ICD-10-CM

## 2025-02-14 DIAGNOSIS — E11.9 CONTROLLED TYPE 2 DIABETES MELLITUS WITHOUT COMPLICATION, WITHOUT LONG-TERM CURRENT USE OF INSULIN (HCC): ICD-10-CM

## 2025-02-14 DIAGNOSIS — M20.11 HALLUX VALGUS, RIGHT: ICD-10-CM

## 2025-02-14 DIAGNOSIS — M79.671 RIGHT FOOT PAIN: ICD-10-CM

## 2025-02-14 DIAGNOSIS — M20.12 HALLUX VALGUS, LEFT: Primary | ICD-10-CM

## 2025-02-14 PROCEDURE — 99213 OFFICE O/P EST LOW 20 MIN: CPT | Performed by: STUDENT IN AN ORGANIZED HEALTH CARE EDUCATION/TRAINING PROGRAM

## 2025-02-17 ENCOUNTER — TELEPHONE (OUTPATIENT)
Dept: INTERNAL MEDICINE CLINIC | Facility: CLINIC | Age: 60
End: 2025-02-17

## 2025-02-17 NOTE — TELEPHONE ENCOUNTER
Left a detailed voicemail on brians confidential voicemail informing him that is it ok to discontinue home health.

## 2025-02-17 NOTE — TELEPHONE ENCOUNTER
Name and  verified.     Received call from renee with [facility] stating they began following this patient and she no longer wants home health services so they will be discharging her from their services.     Dr. Herrera please advise

## 2025-02-19 ENCOUNTER — TELEPHONE (OUTPATIENT)
Facility: CLINIC | Age: 60
End: 2025-02-19

## 2025-02-19 DIAGNOSIS — Z86.19 HISTORY OF HELICOBACTER PYLORI INFECTION: Primary | ICD-10-CM

## 2025-02-19 RX ORDER — FLUTICASONE FUROATE AND VILANTEROL 200; 25 UG/1; UG/1
1 POWDER RESPIRATORY (INHALATION) DAILY
COMMUNITY
Start: 2025-02-03

## 2025-02-19 RX ORDER — AMLODIPINE BESYLATE 2.5 MG/1
2.5 TABLET ORAL DAILY
COMMUNITY
Start: 2025-02-16

## 2025-02-19 RX ORDER — BUDESONIDE AND FORMOTEROL FUMARATE DIHYDRATE 160; 4.5 UG/1; UG/1
AEROSOL RESPIRATORY (INHALATION)
COMMUNITY
Start: 2025-02-03

## 2025-02-19 RX ORDER — FUROSEMIDE 40 MG/1
40 TABLET ORAL DAILY
COMMUNITY
Start: 2025-02-16

## 2025-02-19 RX ORDER — LOSARTAN POTASSIUM 50 MG/1
50 TABLET ORAL DAILY
COMMUNITY
Start: 2025-02-16

## 2025-02-19 RX ORDER — POTASSIUM CHLORIDE 750 MG/1
10 TABLET, EXTENDED RELEASE ORAL DAILY
COMMUNITY
Start: 2025-02-02

## 2025-02-19 RX ORDER — DIPHENHYDRAMINE HYDROCHLORIDE 12.5 MG/5ML
LIQUID ORAL
COMMUNITY
Start: 2024-12-06

## 2025-02-19 NOTE — PATIENT INSTRUCTIONS
Refill policies:    Allow 2-3 business days for refills; controlled substances may take longer.  Contact your pharmacy at least 5 days prior to running out of medication and have them send an electronic request or submit request through the “request refill” option in your [employer] account.  Refills are not addressed on weekends; covering physicians do not authorize routine medications on weekends.  No narcotics or controlled substances are refilled after noon on Fridays or by on call physicians.  By law, narcotics must be electronically prescribed.  A 30 day supply with no refills is the maximum allowed.  If your prescription is due for a refill, you may be due for a follow up appointment.  To best provide you care, patients receiving routine medications need to be seen at least once a year.  Patients receiving narcotic/controlled substance medications need to be seen at least once every 3 months.  In the event that your preferred pharmacy does not have the requested medication in stock (e.g. Backordered), it is your responsibility to find another pharmacy that has the requested medication available.  We will gladly send a new prescription to that pharmacy at your request.    Scheduling Tests:    If your physician has ordered radiology tests such as MRI or CT scans, please contact Central Scheduling at [phone] right away to schedule the test.  Once scheduled, the [facility] Centralized Referral Team will work with your insurance carrier to obtain pre-certification or prior authorization.  Depending on your insurance carrier, approval may take 3-10 days.  It is highly recommended patients assure they have received an authorization before having a test performed.  If test is done without insurance authorization, patient may be responsible for the entire amount billed.      Precertification and Prior Authorizations:  If your physician has recommended that you have a procedure or additional testing performed the [facility]  Centralized Referral Team will contact your insurance carrier to obtain pre-certification or prior authorization.    You are strongly encouraged to contact your insurance carrier to verify that your procedure/test has been approved and is a COVERED benefit.  Although the [facility] Centralized Referral Team does its due diligence, the insurance carrier gives the disclaimer that \"Although the procedure is authorized, this does not guarantee payment.\"    Ultimately the patient is responsible for payment.   Thank you for your understanding in this matter.  Paperwork Completion:  If you require FMLA or disability paperwork for your recovery, please make sure to either drop it off or have it faxed to our office at [phone]. Be sure the form has your name and date of birth on it.  The form will be faxed to our Forms Department and they will complete it for you.  There is a 25$ fee for all forms that need to be filled out.  Please be aware there is a 10-14 day turnaround time.  You will need to sign a release of information (SISI) form if your paperwork does not come with one.  You may call the Forms Department with any questions at [phone].  Their fax number is [phone].

## 2025-02-19 NOTE — TELEPHONE ENCOUNTER
I spoke to the pt's daughter. Pt is scheduled on 03/08/2025 for the h pylori breath test    Pt aware to stop PPI 2 weeks prior to testing

## 2025-02-19 NOTE — TELEPHONE ENCOUNTER
Omeprazole BID+ Amoxicillin BID+ Clarithromycin BID) x 14 days ordered on 12/09/2024    Please sign breath test order.

## 2025-02-20 ENCOUNTER — OFFICE VISIT (OUTPATIENT)
Facility: CLINIC | Age: 60
End: 2025-02-20
Payer: MEDICAID

## 2025-02-20 VITALS
HEIGHT: 64 IN | HEART RATE: 88 BPM | WEIGHT: 293 LBS | BODY MASS INDEX: 50.02 KG/M2 | DIASTOLIC BLOOD PRESSURE: 85 MMHG | SYSTOLIC BLOOD PRESSURE: 133 MMHG

## 2025-02-20 DIAGNOSIS — G89.29 CHRONIC BILATERAL LOW BACK PAIN WITHOUT SCIATICA: Primary | ICD-10-CM

## 2025-02-20 DIAGNOSIS — M54.50 CHRONIC BILATERAL LOW BACK PAIN WITHOUT SCIATICA: Primary | ICD-10-CM

## 2025-02-20 PROCEDURE — 99204 OFFICE O/P NEW MOD 45 MIN: CPT | Performed by: NEUROLOGICAL SURGERY

## 2025-02-20 NOTE — PROGRESS NOTES
New patient:  Reason for visit: New patient presents to clinic complaining of right lower back and right hip pain.    Daughter in law -  - Yenifer    Dx: Lumbar radiculopathy  Referred: Dr. Herrera    Estimated time of onset:  Last summer 2024    Numeric Rating Scale:      Pain at Present:  7/10                                                                                                                       Distribution of Pain:    Right     Past Treatments for Current Pain Condition:    Surgery: No  Physical Therapy: Yes   Injections: Lumbar ESIs in October with Dr. De La Rosa   Medications: Tramadol 50 mg some relief for a few hours     Prior diagnostic testing related to this condition:  MRI Lumbar completed on 10/03/2024 in chart.

## 2025-02-20 NOTE — PROGRESS NOTES
[facility]  Progress Note    Judi White is a 60 year old female.   Chief Complaint   Patient presents with    Bunions     Language line called and spoke with Evonne  ID#[id number] - bilateral big toes - onset long time ago - has a burning pain with shoes on rated as 10/10 at all the time - L is worse than R          HPI:     Patient is a very pleasant 60-year-old female who presents to clinic for evaluation of painful bunions to both feet.  She has burning pain associated with her painful bunions that she rates at a 10 out of 10.  She relates that her left foot is worse than the right.  She is active and on her feet a lot.  She has tried supportive shoes with wide toe box.  She is wondering what other treatment options are available/is interested in discussing surgery.  No other complaints are mentioned.  Past medical history, medications, and allergies reviewed.      Allergies: Trulicity [dulaglutide] and Lactose   Current Outpatient Medications   Medication Sig Dispense Refill    amLODIPine 2.5 MG Oral Tab Take 1 tablet (2.5 mg total) by mouth daily.      SYMBICORT 160-4.5 MCG/ACT Inhalation Aerosol INHALE 2 PUFF INTO THE LUNGS TWICE DAILY      diphenhydrAMINE HCl Childrens 12.5 MG/5ML Oral Liquid TO BE MIXED WITH 8 TABLETS OF 20 MG HYDROCORTISONE. SWISH AND CAN SWALLOW 5 ML OF THE MIXTURE EVERY 6 HOURS FOR 10 DAYS      fluticasone furoate-vilanterol 200-25 MCG/ACT Inhalation Aerosol Powder, Breath Activated Inhale 1 puff into the lungs daily.      losartan 50 MG Oral Tab Take 1 tablet (50 mg total) by mouth daily.      Potassium Chloride ER 10 MEQ Oral Tab CR Take 1 tablet (10 mEq total) by mouth daily.      furosemide 40 MG Oral Tab Take 1 tablet (40 mg total) by mouth daily.      traMADol 50 MG Oral Tab Take 1 tablet (50 mg total) by mouth every 12 (twelve) hours as needed for Pain. 30 tablet 0    Lidocaine Viscous HCl 2 % Mouth/Throat Solution Take 5 mL by mouth every 3 (three) hours as  needed for Pain. 100 mL 0    pregabalin (LYRICA) 75 MG Oral Cap Take 1 capsule (75 mg total) by mouth 2 (two) times daily. 60 capsule 0    AMOXICILLIN OR Take 2 500mg tablets by mouth twice a day      triamcinolone 0.1 % External Cream Apply topically 2 (two) times daily as needed. 15 g 1    busPIRone HCl 7.5 MG Oral Tab Take 1 tablet (7.5 mg total) by mouth 3 (three) times daily as needed. 90 tablet 1    Omeprazole 40 MG Oral Capsule Delayed Release Take 1 capsule (40 mg total) by mouth 2 (two) times daily before meals. 180 capsule 0    semaglutide (OZEMPIC, 0.25 OR 0.5 MG/DOSE,) 2 MG/3ML Subcutaneous Solution Pen-injector Inject 0.25 mg into the skin once a week. (Patient not taking: Reported on 2024) 3 mL 0    Insulin Pen Needle (PEN NEEDLES) 32G X 4 MM Does not apply Misc 1 each daily. 90 each 0    valsartan 160 MG Oral Tab Take 1 tablet (160 mg total) by mouth daily. 90 tablet 3    ketoconazole 2 % External Cream Apply to affected area twice daily 30 g 0    metFORMIN  MG Oral Tablet 24 Hr Take 1 tablet (750 mg total) by mouth daily. 90 tablet 2    atorvastatin 10 MG Oral Tab Take 1 tablet (10 mg total) by mouth nightly. 90 tablet 3    furosemide 20 MG Oral Tab Take  1 tab po daily (Patient not taking: Reported on 2024) 90 tablet 0      Past Medical History:    Amenorrhea    Anxiety    Diabetes (HCC)    High blood pressure    High cholesterol    Hypertension    Mixed hyperlipidemia    Obesity    Scoliosis    Shortness of breath    Sleep apnea    needs new CPAP machine    Varicose vein      Past Surgical History:   Procedure Laterality Date    Colonoscopy N/A 2024    ; colon polyps,hemorrhoids    Colonoscopy N/A 2024    Procedure: COLONOSCOPY;  Surgeon: Ghassan Mayer MD;  Location: [facility] ENDOSCOPY    Egd N/A 2024    ; esophagitis,Hiatal hernia      1989    Upper gi endoscopy,exam        History reviewed. No pertinent family history.   Social  History     Socioeconomic History    Marital status:    Tobacco Use    Smoking status: Never     Passive exposure: Never    Smokeless tobacco: Never   Vaping Use    Vaping status: Never Used   Substance and Sexual Activity    Alcohol use: Not Currently    Drug use: Never    Sexual activity: Yes   Other Topics Concern    Caffeine Concern No    Exercise No    Seat Belt No    Special Diet No    Stress Concern No    Weight Concern Yes    Grew up on a farm Yes    History of tanning Yes    Outdoor occupation Yes    Breast feeding No    Reaction to local anesthetic No    Pt has a pacemaker No    Pt has a defibrillator No           REVIEW OF SYSTEMS:     No n/v/f/c.      EXAM:   There were no vitals taken for this visit.  GENERAL: well developed, well nourished, in no apparent distress  EXTREMITIES:   1. Integument: Normal skin temperature and turgor  2. Vascular: Dorsalis pedis two out of four bilateral and posterior tibial pulses two out of   four bilateral, capillary refill normal.   3. Musculoskeletal: All muscle groups are graded 5 out of 5 in the foot and ankle.  Medial deviation of first metatarsal and lateralization of hallux consistent with moderate to severe bunion deformity of the left and right feet.  Pain with prominent medial eminence.  Fairly good first MPJ range of motion.  Compartments are soft and compressible.  No strength deficits.   4. Neurological: Normal sharp dull sensation; reflexes normal.          ASSESSMENT AND PLAN:   Diagnoses and all orders for this visit:    Hallux valgus, left  -     [facility] AMB POD XR - LT FOOT 2 VIEWS(AP, LATERAL)WT BEARING    Hallux valgus, right  -     [facility] AMB POD XR - RT FOOT 2 VIEWS(AP, LATERAL)WT BEARING    Left foot pain    Right foot pain    Controlled type 2 diabetes mellitus without complication, without long-term current use of insulin ([facility])        Plan:    -Patient examined, chart history reviewed.  -Discussed etiology of condition and various treatment  options.  -X-rays obtained and reviewed.  Patient does have moderate to severe bunion deformity of both feet with the left worse than the right.  -Discussed treatment options at length including conservative and surgical treatment options.  -Conservatively, discussed importance of supportive shoes with wide toe box.  Informational handout dispensed with recommended shoes and inserts.  Toe spacers were also dispensed to patient and she can use to help keep toe in more rectus position.  -Surgically, discussed with patient that I would recommend Lapidus bunionectomy.  Surgery was discussed in detail clinic benefits, risks, recovery period.  -Discussed with patient that I am concerned about her staying off foot/her weight playing role in her recovery.  Discussed that her risk of nonunion or malunion could increase depending on compliance with postop protocol and managing her weight.  -Patient is possibly interested in surgery this spring but may be moving back to [facility].  -Discussed with patient that I would recommend she try conservative management for starters.  She can also work on losing some weight that would assist with her recovery.  If she does end up staying in the , would be happy to help perform surgery later this spring.  Can reach out to schedule once future plans are finalized/once patient has tried conservative treatment options.    All questions answered to satisfaction.  Appreciate the opportunity participate in patient's care.    The patient indicates understanding of these issues and agrees to the plan.        Jones Joe DPM    [employer] speech recognition software was used to prepare this note.  Errors in word recognition may occur.  Please contact me with any questions/concerns with this note.

## 2025-02-20 NOTE — H&P
SONI BOND M.D., F.A.A.N.S.     of Neurosurgery  [facility]  Board Certified Neurosurgeon                              [facility]        Center for [facility]      [address]  [address] [place] [address]    PHONE  [phone]          FAX  [phone]    [email or web address]    [facility], [address], [place]     OFFICE CONSULTATION          Judi White  : 1965   MRN: [id number]    PCP: Estelle Multani MD  Referring Provider: Ashleigh Herrera     Insurance: Payor: MEDICAID [id number] / Plan: [facility] PLANS / Product Type: *No Product type* /            Date of Consult:  2025    Reason for Consultation:  Our patient has been referred to our office for evaluation of: Chronic lower back pain      History of Present Illness:  Judi White is a a(n) right-handed, 60 year old, female.  This is a morbidly obese lady with history of chronic lower back pain that started during a breathing of her first baby.  The pain is localized to the lower back and does not involve her lower extremities.  She denies any sensory changes in the lower extremities.  She denies any focal weakness in any acute bowel or bladder changes.  She has been evaluated by Dr. De La Rosa who has ordered physical therapy, started her on tramadol and also scheduled epidural steroid injections.        History:  Past Medical History:    Amenorrhea    Anxiety    Diabetes (HCC)    High blood pressure    High cholesterol    Hypertension    Mixed hyperlipidemia    Obesity    Scoliosis    Shortness of breath    Sleep apnea    needs new CPAP machine    Varicose vein      Past Surgical History:   Procedure Laterality Date    Colonoscopy N/A 2024    ; colon polyps,hemorrhoids    Colonoscopy N/A 2024    Procedure: COLONOSCOPY;  Surgeon: Ghassan Mayer MD;  Location: [facility]  ENDOSCOPY    Egd N/A 2024    ; esophagitis,Hiatal hernia      1989    Upper gi endoscopy,exam       No family history on file.   Social History     Socioeconomic History    Marital status:      Spouse name: Not on file    Number of children: Not on file    Years of education: Not on file    Highest education level: Not on file   Occupational History    Not on file   Tobacco Use    Smoking status: Never     Passive exposure: Never    Smokeless tobacco: Never   Vaping Use    Vaping status: Never Used   Substance and Sexual Activity    Alcohol use: Not Currently    Drug use: Never    Sexual activity: Yes   Other Topics Concern    Caffeine Concern No    Exercise No    Seat Belt No    Special Diet No    Stress Concern No    Weight Concern Yes    Grew up on a farm Yes    History of tanning Yes    Outdoor occupation Yes    Breast feeding No    Reaction to local anesthetic No    Pt has a pacemaker No    Pt has a defibrillator No   Social History Narrative    Not on file     Social Drivers of Health     Food Insecurity: Low Risk  (2025)    Received from [facility]    Food Insecurity     Have there been times that your food ran out, and you didn't have money to get more?: No     Are there times that you worry that this might happen?: No   Transportation Needs: Low Risk  (2025)    Received from [facility]    Transportation Needs     Do you have trouble getting transportation to medical appointments?: No     How do you normally get to and from your appointments?: Not on file   Stress: Not on file   Housing Stability: Low Risk  (2025)    Received from [facility]    Housing Stability     Are you worried that your electric, gas, oil, or water might be shut off?: No     Are you concerned about having a safe and reliable place to live?: No        Allergies:  Allergies[1]    Medications:  Current Outpatient Medications    Medication Sig Dispense Refill    amLODIPine 2.5 MG Oral Tab Take 1 tablet (2.5 mg total) by mouth daily.      SYMBICORT 160-4.5 MCG/ACT Inhalation Aerosol INHALE 2 PUFF INTO THE LUNGS TWICE DAILY      diphenhydrAMINE HCl Childrens 12.5 MG/5ML Oral Liquid TO BE MIXED WITH 8 TABLETS OF 20 MG HYDROCORTISONE. SWISH AND CAN SWALLOW 5 ML OF THE MIXTURE EVERY 6 HOURS FOR 10 DAYS      fluticasone furoate-vilanterol 200-25 MCG/ACT Inhalation Aerosol Powder, Breath Activated Inhale 1 puff into the lungs daily.      losartan 50 MG Oral Tab Take 1 tablet (50 mg total) by mouth daily.      Potassium Chloride ER 10 MEQ Oral Tab CR Take 1 tablet (10 mEq total) by mouth daily.      furosemide 40 MG Oral Tab Take 1 tablet (40 mg total) by mouth daily.      traMADol 50 MG Oral Tab Take 1 tablet (50 mg total) by mouth every 12 (twelve) hours as needed for Pain. 30 tablet 0    Lidocaine Viscous HCl 2 % Mouth/Throat Solution Take 5 mL by mouth every 3 (three) hours as needed for Pain. 100 mL 0    pregabalin (LYRICA) 75 MG Oral Cap Take 1 capsule (75 mg total) by mouth 2 (two) times daily. 60 capsule 0    AMOXICILLIN OR Take 2 500mg tablets by mouth twice a day      triamcinolone 0.1 % External Cream Apply topically 2 (two) times daily as needed. 15 g 1    busPIRone HCl 7.5 MG Oral Tab Take 1 tablet (7.5 mg total) by mouth 3 (three) times daily as needed. 90 tablet 1    Omeprazole 40 MG Oral Capsule Delayed Release Take 1 capsule (40 mg total) by mouth 2 (two) times daily before meals. 180 capsule 0    semaglutide (OZEMPIC, 0.25 OR 0.5 MG/DOSE,) 2 MG/3ML Subcutaneous Solution Pen-injector Inject 0.25 mg into the skin once a week. (Patient not taking: Reported on 12/30/2024) 3 mL 0    Insulin Pen Needle (PEN NEEDLES) 32G X 4 MM Does not apply Misc 1 each daily. 90 each 0    valsartan 160 MG Oral Tab Take 1 tablet (160 mg total) by mouth daily. 90 tablet 3    ketoconazole 2 % External Cream Apply to affected area twice daily 30 g 0     metFORMIN  MG Oral Tablet 24 Hr Take 1 tablet (750 mg total) by mouth daily. 90 tablet 2    atorvastatin 10 MG Oral Tab Take 1 tablet (10 mg total) by mouth nightly. 90 tablet 3    furosemide 20 MG Oral Tab Take  1 tab po daily (Patient not taking: Reported on 12/30/2024) 90 tablet 0        Review of Systems:  A 10-point system was reviewed.  Pertinent positives and negatives are noted in HPI.      Physical Exam:  /85 (BP Location: Left arm, Patient Position: Sitting, Cuff Size: large)   Pulse 88   Ht 64\"   Wt (!) 318 lb (144.2 kg)   BMI 54.58 kg/m²        Neurological Exam:    Motor Strength:  5/5 AMG and symmetric    Sensation to light touch:  Intact and symmetric in lower extremities    DTRs:  1+ out of 4 in the lower extremities and symmetrical    Long tract signs:  Negative lyle  Negative babinski  Negative clonus      Abdomen:  Soft, non-distended, non-tender, with no rebound or guarding.  No peritoneal signs.     Extremities:  Non-tender, no lower extremity edema noted.      Labs:  CBC:  Lab Results   Component Value Date    WBC 8.3 12/30/2024    HGB 12.7 12/30/2024    HEMOGLOBIN 13.2 11/21/2024    HCT 38.7 12/30/2024    MCV 85.8 12/30/2024    .0 12/30/2024      BMG:   Lab Results   Component Value Date     02/12/2025    K 4.3 02/12/2025    CO2 29.0 02/12/2025     02/12/2025    BUN 11 02/12/2025      INR:   No results found for: \"INR\", \"PROTIME\"     Diagnostics:  I reviewed an MRI of the lumbar spine with proper alignment of the lumbar vertebral bodies and maintenance of regional lumbar lordosis.  There is moderate degree of degenerative disease, most significant at the lumbosacral junction with mild left foraminal narrowing.  There is no significant central canal stenosis.     Diagnosis:  1. Chronic bilateral low back pain without sciatica        Assessment/Plan:  Judi White is a a(n) 60 year old, female, with what appears to be musculoskeletal lower back pain,  likely exacerbated by her morbid obesity.  I discussed with her and her daughter via the  that there was no surgical lesion that I could appreciate and she was not a surgical candidate at this point in time.  She is definitely encouraged to continue following with Dr. De La Rosa to consider the injections and physical therapy.  Furthermore, appropriate weight loss measures should be taken as well.    All questions and concerns were addressed. We appreciate the opportunity to participate in the care of this patient. Please do not hesitate to call our office ([phone]) with any issues.   This report will be submitted to the referring provider.          Sukhwinder Corrales M.D., F.A.A.N.S.    2/20/2025  8:37 AM    This note has been dictated utilizing voice recognition software. Unfortunately, this may lead to occasional typographical errors. If there are any questions regarding this, please do not hesitate to contact our office.        [1]   Allergies  Allergen Reactions    Trulicity [Dulaglutide] NAUSEA ONLY    Lactose ITCHING

## 2025-02-24 ENCOUNTER — TELEPHONE (OUTPATIENT)
Dept: INTERNAL MEDICINE CLINIC | Facility: CLINIC | Age: 60
End: 2025-02-24

## 2025-02-24 NOTE — TELEPHONE ENCOUNTER
[facility] order #:[id number] for discharge received.    Reviewed and completed by covering provider. Faxed to [phone]. Confirmation received.

## 2025-02-24 NOTE — TELEPHONE ENCOUNTER
[facility], order number: [id number].    Reviewed and completed by provider. Faxed, confirmation received.

## 2025-02-25 ENCOUNTER — LAB ENCOUNTER (OUTPATIENT)
Dept: LAB | Age: 60
End: 2025-02-25
Attending: STUDENT IN AN ORGANIZED HEALTH CARE EDUCATION/TRAINING PROGRAM
Payer: MEDICAID

## 2025-02-25 DIAGNOSIS — I10 HTN (HYPERTENSION): Primary | ICD-10-CM

## 2025-02-25 DIAGNOSIS — E78.2 MIXED HYPERLIPIDEMIA: ICD-10-CM

## 2025-02-25 DIAGNOSIS — R06.09 DOE (DYSPNEA ON EXERTION): ICD-10-CM

## 2025-02-26 ENCOUNTER — LAB ENCOUNTER (OUTPATIENT)
Dept: LAB | Age: 60
End: 2025-02-26
Attending: STUDENT IN AN ORGANIZED HEALTH CARE EDUCATION/TRAINING PROGRAM
Payer: MEDICAID

## 2025-02-26 DIAGNOSIS — E78.2 MIXED HYPERLIPIDEMIA: ICD-10-CM

## 2025-02-26 DIAGNOSIS — I10 HTN (HYPERTENSION): ICD-10-CM

## 2025-02-26 DIAGNOSIS — R06.09 DOE (DYSPNEA ON EXERTION): ICD-10-CM

## 2025-02-26 LAB
ALBUMIN SERPL-MCNC: 4.6 G/DL (ref 3.2–4.8)
ALBUMIN/GLOB SERPL: 1.5 {RATIO} (ref 1–2)
ALP LIVER SERPL-CCNC: 81 U/L
ALT SERPL-CCNC: 57 U/L
ANION GAP SERPL CALC-SCNC: 7 MMOL/L (ref 0–18)
AST SERPL-CCNC: 39 U/L (ref ?–34)
BILIRUB SERPL-MCNC: 0.8 MG/DL (ref 0.2–1.1)
BUN BLD-MCNC: 10 MG/DL (ref 9–23)
BUN/CREAT SERPL: 14.3 (ref 10–20)
CALCIUM BLD-MCNC: 9.2 MG/DL (ref 8.7–10.4)
CHLORIDE SERPL-SCNC: 101 MMOL/L (ref 98–112)
CHOLEST SERPL-MCNC: 113 MG/DL (ref ?–200)
CO2 SERPL-SCNC: 32 MMOL/L (ref 21–32)
CREAT BLD-MCNC: 0.7 MG/DL
EGFRCR SERPLBLD CKD-EPI 2021: 99 ML/MIN/1.73M2 (ref 60–?)
EST. AVERAGE GLUCOSE BLD GHB EST-MCNC: 140 MG/DL (ref 68–126)
FASTING PATIENT LIPID ANSWER: YES
FASTING STATUS PATIENT QL REPORTED: YES
GLOBULIN PLAS-MCNC: 3.1 G/DL (ref 2–3.5)
GLUCOSE BLD-MCNC: 119 MG/DL (ref 70–99)
HBA1C MFR BLD: 6.5 % (ref ?–5.7)
HDLC SERPL-MCNC: 33 MG/DL (ref 40–59)
LDLC SERPL CALC-MCNC: 63 MG/DL (ref ?–100)
NONHDLC SERPL-MCNC: 80 MG/DL (ref ?–130)
NT-PROBNP SERPL-MCNC: <35 PG/ML (ref ?–125)
OSMOLALITY SERPL CALC.SUM OF ELEC: 290 MOSM/KG (ref 275–295)
POTASSIUM SERPL-SCNC: 3.9 MMOL/L (ref 3.5–5.1)
PROT SERPL-MCNC: 7.7 G/DL (ref 5.7–8.2)
SODIUM SERPL-SCNC: 140 MMOL/L (ref 136–145)
TRIGL SERPL-MCNC: 83 MG/DL (ref 30–149)
VLDLC SERPL CALC-MCNC: 12 MG/DL (ref 0–30)

## 2025-02-26 PROCEDURE — 83036 HEMOGLOBIN GLYCOSYLATED A1C: CPT

## 2025-02-26 PROCEDURE — 83880 ASSAY OF NATRIURETIC PEPTIDE: CPT

## 2025-02-26 PROCEDURE — 80053 COMPREHEN METABOLIC PANEL: CPT

## 2025-02-26 PROCEDURE — 80061 LIPID PANEL: CPT

## 2025-02-26 PROCEDURE — 36415 COLL VENOUS BLD VENIPUNCTURE: CPT

## 2025-03-08 ENCOUNTER — NURSE ONLY (OUTPATIENT)
Dept: LAB | Age: 60
End: 2025-03-08
Attending: STUDENT IN AN ORGANIZED HEALTH CARE EDUCATION/TRAINING PROGRAM
Payer: MEDICAID

## 2025-03-08 DIAGNOSIS — Z86.19 HISTORY OF HELICOBACTER PYLORI INFECTION: ICD-10-CM

## 2025-03-08 PROCEDURE — 83013 H PYLORI (C-13) BREATH: CPT

## 2025-03-09 LAB — H PYLORI BREATH TEST: NEGATIVE

## 2025-03-10 ENCOUNTER — TELEPHONE (OUTPATIENT)
Facility: CLINIC | Age: 60
End: 2025-03-10

## 2025-03-11 NOTE — TELEPHONE ENCOUNTER
I spoke to the pt's daughter and let her know the breath test came back normal meaning the antibiotics cleared the infection.    She voices understanding

## 2025-03-17 RX ORDER — SEMAGLUTIDE 0.68 MG/ML
0.25 INJECTION, SOLUTION SUBCUTANEOUS WEEKLY
Qty: 3 ML | Refills: 0 | Status: CANCELLED | OUTPATIENT
Start: 2025-03-17

## 2025-03-20 NOTE — TELEPHONE ENCOUNTER
Please review; protocol failed/No Protocol    Pended for the next dose 0.5mg patient did the beginning dose of 0.25mg for 1 month.- please advise if appropriate.

## 2025-03-21 RX ORDER — SEMAGLUTIDE 0.68 MG/ML
0.5 INJECTION, SOLUTION SUBCUTANEOUS WEEKLY
Qty: 3 ML | Refills: 0 | Status: SHIPPED | OUTPATIENT
Start: 2025-03-21

## 2025-03-24 ENCOUNTER — APPOINTMENT (OUTPATIENT)
Dept: PEDIATRICS CLINIC | Facility: CLINIC | Age: 60
End: 2025-03-24
Payer: MEDICAID

## 2025-03-24 ENCOUNTER — APPOINTMENT (OUTPATIENT)
Dept: GENERAL RADIOLOGY | Facility: HOSPITAL | Age: 60
End: 2025-03-24
Attending: PHYSICAL MEDICINE & REHABILITATION
Payer: MEDICAID

## 2025-03-24 ENCOUNTER — HOSPITAL ENCOUNTER (OUTPATIENT)
Facility: HOSPITAL | Age: 60
Setting detail: HOSPITAL OUTPATIENT SURGERY
Discharge: HOME | End: 2025-03-24
Attending: PHYSICAL MEDICINE & REHABILITATION | Admitting: PHYSICAL MEDICINE & REHABILITATION
Payer: MEDICAID

## 2025-03-24 VITALS
DIASTOLIC BLOOD PRESSURE: 98 MMHG | HEART RATE: 91 BPM | HEIGHT: 64.75 IN | BODY MASS INDEX: 49.41 KG/M2 | OXYGEN SATURATION: 98 % | RESPIRATION RATE: 19 BRPM | WEIGHT: 293 LBS | SYSTOLIC BLOOD PRESSURE: 145 MMHG

## 2025-03-24 PROBLEM — M54.16 LEFT LUMBAR RADICULOPATHY: Status: ACTIVE | Noted: 2025-03-24

## 2025-03-24 LAB — GLUCOSE BLDC GLUCOMTR-MCNC: 107 MG/DL (ref 70–99)

## 2025-03-24 PROCEDURE — 64483 NJX AA&/STRD TFRM EPI L/S 1: CPT | Performed by: PHYSICAL MEDICINE & REHABILITATION

## 2025-03-24 PROCEDURE — 3E0R3BZ INTRODUCTION OF ANESTHETIC AGENT INTO SPINAL CANAL, PERCUTANEOUS APPROACH: ICD-10-PCS | Performed by: PHYSICAL MEDICINE & REHABILITATION

## 2025-03-24 PROCEDURE — 64484 NJX AA&/STRD TFRM EPI L/S EA: CPT | Performed by: PHYSICAL MEDICINE & REHABILITATION

## 2025-03-24 PROCEDURE — 3E0R33Z INTRODUCTION OF ANTI-INFLAMMATORY INTO SPINAL CANAL, PERCUTANEOUS APPROACH: ICD-10-PCS | Performed by: PHYSICAL MEDICINE & REHABILITATION

## 2025-03-24 RX ORDER — BETAMETHASONE SODIUM PHOSPHATE AND BETAMETHASONE ACETATE 3; 3 MG/ML; MG/ML
INJECTION, SUSPENSION INTRA-ARTICULAR; INTRALESIONAL; INTRAMUSCULAR; SOFT TISSUE
Status: DISCONTINUED | OUTPATIENT
Start: 2025-03-24 | End: 2025-03-24

## 2025-03-24 RX ORDER — IOPAMIDOL 408 MG/ML
INJECTION, SOLUTION INTRATHECAL
Status: DISCONTINUED | OUTPATIENT
Start: 2025-03-24 | End: 2025-03-24

## 2025-03-24 RX ORDER — LIDOCAINE HYDROCHLORIDE 10 MG/ML
INJECTION, SOLUTION EPIDURAL; INFILTRATION; INTRACAUDAL; PERINEURAL
Status: DISCONTINUED | OUTPATIENT
Start: 2025-03-24 | End: 2025-03-24

## 2025-03-24 NOTE — DISCHARGE INSTRUCTIONS
[facility] ENDOSCOPY LAB SUITES  [address]  [facility] [address]  Dept: [phone]  Loc: [phone]    No name on file       Post Injection/Procedure Instructions    PAIN:  Your usual pain should gradually decrease in 2-3 days, but relief may sometimes take up to two weeks after your procedure.  A temporary flare-up of pain for 1-2 days after a procedure is also normal.  Please take your usual pain medicines if this happens.      ACTIVITY LEVELS:  Day of Procedure:  Take it easy.  We recommend no new activities or heavy work.  Avoid sitting or standing for long periods of time and change your position as needed.  Day 2:  You may return to normal activities within reason.  If your physician gives you specific instructions, please follow these.  You may return to physical therapy.      DIET:  Return to your normal diet as tolerated.    MEDICATIONS:  Aspirin and Blood-thinners:  Follow specific instructions your doctor gave you.  Otherwise, refrain from taking aspirin and other blood-thinning medications for 6 hours after your injection.  Then resume your next scheduled dose.    Resume your other medications the day of the injection.    BANDAGE:  Remove after 24 hours.    SHOWERING:  You may shower the following day.  No bathing, swimming, or hot tub for 2 days after the procedure to reduce the risk of infection.    COLD PACKS:  You may use ice compresses over the injection site - 20 minutes on, then 40 minutes off as needed.  To avoid skin injury, place a thin cloth between cold pack and the skin.  Do not apply cold packs to numb areas following injection.    Contact [facility] immediately if you experience any of the following:  Fever (over 100 degrees F), chills, drainage, excessive swelling or redness from the injection site, problems with bowel or bladder control, or new weakness or new severe pain.  If you cannot reach your physician, please go to the nearest  emergency room.      COMMON SIDE EFFECTS:  Numbness for 4 to 8 hours due to the local anesthetic.  Minor pain at the injection site.  A small amount of bleeding at the injection site.  If a steroid medication was used during the procedure, possible side effects include redness of the face, headache, trouble sleeping, indigestion, increased appetite and/or a low grade fever (less that 100 degrees F).  All side effects should disappear within 1 to 3 days after the procedure.    POST-PROCEDURAL HEADACHE:  Mild headaches may be a normal reaction after a steroid injection.  Lie down as much as possible for the first 24 hours.  You can take Tylenol up to 3 grams per day in doses of 1 gram every 8 hours.  Drink plenty of caffeinated beverages.  If you continue to have headaches after 24 hours following the procedure, or experience severe headaches, please contact our office.

## 2025-03-24 NOTE — OPERATIVE REPORT
Procedure note: Transforaminal Epidural Steroid Injection                    Informed Consent Obtained by: Chantel De La Rosa D.O.     Procedure: TRANSFORAMINAL EPIDURAL STEROID INJECTION UNDER FLUOROSCOPY OF Left L5-S1 and S1.     Pre-operative Diagnosis: Lumbar Radiculitis   Post-operative Diagnosis: Lumbar Radiculitis     Summary of Procedure:     Informed Consent was obtained after discussion of benefits and risks, including possible nerve injury.       The patient was brought to the fluoroscopy suite and assisted onto the procedure table in the prone position.  BP, Pulse Ox, and EKG monitors were applied, and vital signs were monitored throughout the case.  After sterile prep and drape, the neural foramina at L5-S1 and S1 were identified under fluoroscopic view. First local infiltration with 1% lidocaine was done and the left L5-S1 foramen was approached under a slightly oblique view at the 6’ O’clock position of the left L5 pedicle, with a 22 gauge (blunt tip) needle, until the foramen was entered.  After atraumatic placement, needle tip position was confirmed in AP, Oblique, and lateral fluoroscopic view, and then confirmed using 0.5 cc of Omnipaque 240, with no intravascular and intrathecal spread. The left L5 nerve root was then treated atraumatically with 4cc total of 2cc mg Celestone diluted in 1% Lidocaine PF, with negative aspiration for heme, CSF, and no paresthesias elicited.     The left S1 foramen was then addressed under cephalad and slight ipsilaral tilt. After local infiltration with 1% lidocaine a 7\" 22 gauge spinal needle was used to approach the lateral aspect of the foramen. Under lateral view the needle was advanced. Using omnipaque the S1 nerve root was outlined after negative aspiration for heme and air. The nerve root was treated with 4cc total of 12mg Celestone diluted in 1% PF lidocaine.  The needle was removed. The needle was then withdrawn and the patient tolerated the procedure well.       Anesthesia: Local  IVF: None  Complications: None     Recovery:  Patient was monitored for 30minutes in recovery with regular VS, pulse ox monitoring.  Patient was moving all extremities and able to ambulate, and patient was given discharge instructions and discharged in care of family/friend in stable condition     Discharge Instructions:     1) Patient was given post-procedure discharge instructions.  2) Patient was ambulating and discharged in stable condition in the care of family member/friend.  3) Patient may continue taking his/her medication as prescribed by the patient’s physician.  4) F/U in 2 weeks for post-procedure evaluation     Chantel De La Rosa DO, FAAPMR & CAQSM  Physical Medicine and Rehabilitation/Sports Medicine  [facility] [place]

## 2025-03-24 NOTE — INTERVAL H&P NOTE
Pre-op Diagnosis: Left lumbar radiculopathy    The above referenced H&P was reviewed by Chantel De La Rosa DO on 3/24/2025, the patient was examined and no significant changes have occurred in the patient's condition since the H&P was performed.  I discussed with the patient and/or legal representative the potential benefits, risks and side effects of this procedure; the likelihood of the patient achieving goals; and potential problems that might occur during recuperation.  I discussed reasonable alternatives to the procedure, including risks, benefits and side effects related to the alternatives and risks related to not receiving this procedure.  We will proceed with procedure as planned.

## 2025-03-31 ENCOUNTER — APPOINTMENT (OUTPATIENT)
Dept: CT IMAGING | Facility: HOSPITAL | Age: 60
End: 2025-03-31
Attending: EMERGENCY MEDICINE
Payer: MEDICAID

## 2025-03-31 ENCOUNTER — HOSPITAL ENCOUNTER (EMERGENCY)
Facility: HOSPITAL | Age: 60
Discharge: HOME | End: 2025-03-31
Attending: EMERGENCY MEDICINE
Payer: MEDICAID

## 2025-03-31 ENCOUNTER — TELEPHONE (OUTPATIENT)
Facility: CLINIC | Age: 60
End: 2025-03-31

## 2025-03-31 VITALS
OXYGEN SATURATION: 97 % | SYSTOLIC BLOOD PRESSURE: 132 MMHG | HEART RATE: 79 BPM | DIASTOLIC BLOOD PRESSURE: 64 MMHG | TEMPERATURE: 98 F | RESPIRATION RATE: 22 BRPM

## 2025-03-31 DIAGNOSIS — R19.7 DIARRHEA, UNSPECIFIED TYPE: Primary | ICD-10-CM

## 2025-03-31 DIAGNOSIS — R20.2 PARESTHESIAS: Primary | ICD-10-CM

## 2025-03-31 LAB
ALBUMIN SERPL-MCNC: 4.4 G/DL (ref 3.2–4.8)
ALBUMIN/GLOB SERPL: 1.5 {RATIO} (ref 1–2)
ALP LIVER SERPL-CCNC: 80 U/L
ALT SERPL-CCNC: 51 U/L
ANION GAP SERPL CALC-SCNC: 9 MMOL/L (ref 0–18)
AST SERPL-CCNC: 30 U/L (ref ?–34)
BASOPHILS # BLD AUTO: 0.04 X10(3) UL (ref 0–0.2)
BASOPHILS NFR BLD AUTO: 0.3 %
BILIRUB SERPL-MCNC: 0.3 MG/DL (ref 0.2–1.1)
BUN BLD-MCNC: 12 MG/DL (ref 9–23)
BUN/CREAT SERPL: 16.7 (ref 10–20)
CALCIUM BLD-MCNC: 8.6 MG/DL (ref 8.7–10.4)
CHLORIDE SERPL-SCNC: 104 MMOL/L (ref 98–112)
CO2 SERPL-SCNC: 25 MMOL/L (ref 21–32)
CREAT BLD-MCNC: 0.72 MG/DL
DEPRECATED RDW RBC AUTO: 45.6 FL (ref 35.1–46.3)
EGFRCR SERPLBLD CKD-EPI 2021: 96 ML/MIN/1.73M2 (ref 60–?)
EOSINOPHIL # BLD AUTO: 0.22 X10(3) UL (ref 0–0.7)
EOSINOPHIL NFR BLD AUTO: 1.6 %
ERYTHROCYTE [DISTWIDTH] IN BLOOD BY AUTOMATED COUNT: 14.9 % (ref 11–15)
GLOBULIN PLAS-MCNC: 3 G/DL (ref 2–3.5)
GLUCOSE BLD-MCNC: 118 MG/DL (ref 70–99)
HCT VFR BLD AUTO: 39.4 %
HGB BLD-MCNC: 13 G/DL
IMM GRANULOCYTES # BLD AUTO: 0.03 X10(3) UL (ref 0–1)
IMM GRANULOCYTES NFR BLD: 0.2 %
LYMPHOCYTES # BLD AUTO: 3.57 X10(3) UL (ref 1–4)
LYMPHOCYTES NFR BLD AUTO: 26.1 %
MAGNESIUM SERPL-MCNC: 1.9 MG/DL (ref 1.6–2.6)
MCH RBC QN AUTO: 27.7 PG (ref 26–34)
MCHC RBC AUTO-ENTMCNC: 33 G/DL (ref 31–37)
MCV RBC AUTO: 84 FL
MONOCYTES # BLD AUTO: 0.89 X10(3) UL (ref 0.1–1)
MONOCYTES NFR BLD AUTO: 6.5 %
NEUTROPHILS # BLD AUTO: 8.94 X10 (3) UL (ref 1.5–7.7)
NEUTROPHILS # BLD AUTO: 8.94 X10(3) UL (ref 1.5–7.7)
NEUTROPHILS NFR BLD AUTO: 65.3 %
OSMOLALITY SERPL CALC.SUM OF ELEC: 287 MOSM/KG (ref 275–295)
PLATELET # BLD AUTO: 221 10(3)UL (ref 150–450)
POTASSIUM SERPL-SCNC: 4.2 MMOL/L (ref 3.5–5.1)
PROT SERPL-MCNC: 7.4 G/DL (ref 5.7–8.2)
RBC # BLD AUTO: 4.69 X10(6)UL
SODIUM SERPL-SCNC: 138 MMOL/L (ref 136–145)
WBC # BLD AUTO: 13.7 X10(3) UL (ref 4–11)

## 2025-03-31 PROCEDURE — 85025 COMPLETE CBC W/AUTO DIFF WBC: CPT | Performed by: EMERGENCY MEDICINE

## 2025-03-31 PROCEDURE — 80053 COMPREHEN METABOLIC PANEL: CPT | Performed by: EMERGENCY MEDICINE

## 2025-03-31 PROCEDURE — 96372 THER/PROPH/DIAG INJ SC/IM: CPT

## 2025-03-31 PROCEDURE — 99284 EMERGENCY DEPT VISIT MOD MDM: CPT

## 2025-03-31 PROCEDURE — 70450 CT HEAD/BRAIN W/O DYE: CPT | Performed by: EMERGENCY MEDICINE

## 2025-03-31 PROCEDURE — 80053 COMPREHEN METABOLIC PANEL: CPT

## 2025-03-31 PROCEDURE — 83735 ASSAY OF MAGNESIUM: CPT | Performed by: EMERGENCY MEDICINE

## 2025-03-31 PROCEDURE — 36415 COLL VENOUS BLD VENIPUNCTURE: CPT

## 2025-03-31 PROCEDURE — 99285 EMERGENCY DEPT VISIT HI MDM: CPT

## 2025-03-31 PROCEDURE — 83735 ASSAY OF MAGNESIUM: CPT

## 2025-03-31 PROCEDURE — 85025 COMPLETE CBC W/AUTO DIFF WBC: CPT

## 2025-03-31 RX ORDER — KETOROLAC TROMETHAMINE 30 MG/ML
30 INJECTION, SOLUTION INTRAMUSCULAR; INTRAVENOUS ONCE
Status: COMPLETED | OUTPATIENT
Start: 2025-03-31 | End: 2025-03-31

## 2025-03-31 RX ORDER — KETOROLAC TROMETHAMINE 10 MG/1
10 TABLET, FILM COATED ORAL EVERY 6 HOURS PRN
Qty: 14 TABLET | Refills: 0 | Status: SHIPPED | OUTPATIENT
Start: 2025-03-31 | End: 2025-04-07

## 2025-03-31 NOTE — TELEPHONE ENCOUNTER
Spoke with patient using language line South Korean ID #[id number]. States she was seen at [facility] ER in [place] on 3/29/25.States she went there for constipation, blood in stool, and abdominal pain. States they advised fluids, fiber, and has taken a suppository and stool softener.  States she did have bowel movement today- 2 small joshua- had some blood with wiping/and blood on the stool. She feels bloated, states she also had some numbness/tingling in arms and legs that occurs at night. Advised if she continues to have those symptoms should go to ER-Red flag symptoms discussed and when to go to ER> Denies headache. Denies dizziness/lightheadedness. Has hx of H. Pylor. States she also believes she has hx with parasite from when she lived in [facility]. Made appointment with Kenisha on 04/3 at 1:30pm.

## 2025-03-31 NOTE — TELEPHONE ENCOUNTER
Patient's daughter states that patient was in ER on Saturday due to constipation and blood in stool. She was told to call this office to speak to RN. Please call.

## 2025-03-31 NOTE — ED INITIAL ASSESSMENT (HPI)
S: Pt presents to ED with c/o headache and bilateral upper and lower extremity tingling/numbness that started today.

## 2025-04-01 NOTE — ED PROVIDER NOTES
Patient Seen in: [facility] Department    History     Chief Complaint   Patient presents with    Numbness       HPI    60-year-old female presents ER with complaint of numbness of her upper lower extremities.  Patient states that she has a past medical history of diabetes, hypertension, high cholesterol.  Patient also complaining of headache.  Patient without any neurological deficits.  Patient moving all 4 extremities.    History reviewed.   Past Medical History:    Amenorrhea    Anxiety    Cataract    Diabetes (HCC)    High blood pressure    High cholesterol    Hypertension    Mixed hyperlipidemia    Obesity    Osteoarthritis    Scoliosis    Shortness of breath    Sleep apnea    needs new CPAP machine    Varicose vein       History reviewed.   Past Surgical History:   Procedure Laterality Date    Colonoscopy N/A 2024    ; colon polyps,hemorrhoids    Colonoscopy N/A 2024    Procedure: COLONOSCOPY;  Surgeon: Ghassan Mayer MD;  Location: [facility] ENDOSCOPY    Egd N/A 2024    ; esophagitis,Hiatal hernia      1989    Other surgical history      vein surgery    Upper gi endoscopy,exam           Medications :  Prescriptions Prior to Admission[1]     History reviewed. No pertinent family history.    Smoking Status:   Social History     Socioeconomic History    Marital status:    Tobacco Use    Smoking status: Never     Passive exposure: Never    Smokeless tobacco: Never   Vaping Use    Vaping status: Never Used   Substance and Sexual Activity    Alcohol use: Not Currently    Drug use: Never    Sexual activity: Yes   Other Topics Concern    Caffeine Concern No    Exercise No    Seat Belt No    Special Diet No    Stress Concern No    Weight Concern Yes    Grew up on a farm Yes    History of tanning Yes    Outdoor occupation Yes    Breast feeding No    Reaction to local anesthetic No    Pt has a pacemaker No    Pt has a defibrillator No       ROS  All  pertinent positives for the review of systems are mentioned in the HPI  All other organ systems are reviewed and are negative.    Constitutional and vital signs reviewed.      Social History and Family History elements reviewed from today, pertinent positives to the presenting problem noted.    Physical Exam     ED Triage Vitals   BP 03/31/25 1725 129/86   Pulse 03/31/25 1723 74   Resp 03/31/25 1723 20   Temp 03/31/25 1723 98 °F (36.7 °C)   Temp src --    SpO2 03/31/25 1723 96 %   O2 Device 03/31/25 2102 None (Room air)       All measures to prevent infection transmission during my interaction with the patient were taken. The patient was already wearing a droplet mask on my arrival to the room. Personal protective equipment including droplet mask, eye protection, and gloves were worn throughout the duration of the exam.  Handwashing was performed prior to and after the exam.  Stethoscope and any equipment used during my examination was cleaned with super sani-cloth germicidal wipes following the exam.     Physical Exam  Vitals and nursing note reviewed.   Constitutional:       Appearance: She is well-developed.   HENT:      Head: Normocephalic and atraumatic.      Right Ear: External ear normal.      Left Ear: External ear normal.      Nose: Nose normal.   Eyes:      General: No visual field deficit.     Conjunctiva/sclera: Conjunctivae normal.      Pupils: Pupils are equal, round, and reactive to light.   Cardiovascular:      Rate and Rhythm: Normal rate and regular rhythm.      Heart sounds: Normal heart sounds.   Pulmonary:      Effort: Pulmonary effort is normal.      Breath sounds: Normal breath sounds.   Abdominal:      General: Bowel sounds are normal.      Palpations: Abdomen is soft.   Musculoskeletal:         General: Normal range of motion.      Cervical back: Normal range of motion and neck supple.   Skin:     General: Skin is warm and dry.   Neurological:      Mental Status: She is alert and oriented to  person, place, and time.      Cranial Nerves: No cranial nerve deficit, dysarthria or facial asymmetry.      Sensory: No sensory deficit.      Motor: No weakness.      Gait: Gait normal.      Deep Tendon Reflexes: Reflexes are normal and symmetric.   Psychiatric:         Behavior: Behavior normal.         Thought Content: Thought content normal.         Judgment: Judgment normal.         ED Course        Labs Reviewed   CBC WITH DIFFERENTIAL WITH PLATELET - Abnormal; Notable for the following components:       Result Value    WBC 13.7 (*)     Neutrophil Absolute Prelim 8.94 (*)     Neutrophil Absolute 8.94 (*)     All other components within normal limits   COMP METABOLIC PANEL (14) - Abnormal; Notable for the following components:    Glucose 118 (*)     Calcium, Total 8.6 (*)     ALT 51 (*)     All other components within normal limits   MAGNESIUM - Normal   RAINBOW DRAW BLUE         Imaging Results Available and Reviewed while in ED: CT BRAIN OR HEAD (CPT=70450)    Result Date: 3/31/2025  CONCLUSION:  1. No acute intracranial finding.    Dictated by (CST): Mahamed Park MD on 3/31/2025 at 8:58 PM     Finalized by (CST): Mahamed Park MD on 3/31/2025 at 9:00 PM         ED Medications Administered:   Medications   ketorolac (Toradol) 30 MG/ML injection 30 mg (has no administration in time range)         MDM     Vitals:    03/31/25 1723 03/31/25 1725 03/31/25 2102   BP:  129/86 132/64   Pulse: 74  79   Resp: 20  22   Temp: 98 °F (36.7 °C)  98 °F (36.7 °C)   SpO2: 96%  97%     *I personally reviewed and interpreted all ED vitals.  I also personally reviewed all labs and imaging if ordered    Pulse Ox: 96%, Room air, Normal     Monitor Interpretation:   normal sinus rhythm    Differential Diagnosis/ Diagnostic Considerations: Paresthesias, CVA, TIA, muscle strain, neuropathy    Medical Record Review: I personally reviewed available prior medical records for any recent pertinent discharge summaries, testing, and  procedures and reviewed those reports.    Complicating Factors: The patient already has does not have any pertinent problems on file. to contribute to the complexity of this ED evaluation.    Medical Decision Making  60-year-old female presents to the ER with complaint of paresthesias to her upper lower extremities.  Patient does have a history of low back pain which could be continuing to her paresthesia.  Patient also with a borderline diabetic and could be from neuropathic pain.  Patient's blood work within normal limits.  Patient CT brain shows no acute intracranial process.  Patient with steady gait and instructed follow-up with neurology if symptoms continue.  Patient given a dose of Toradol as well as Toradol for home.  Patient's daughter bedside made aware of the discharge plan disposition.    Problems Addressed:  Paresthesias: acute illness or injury    Amount and/or Complexity of Data Reviewed  Labs: ordered. Decision-making details documented in ED Course.  Radiology: ordered and independent interpretation performed. Decision-making details documented in ED Course.        Condition upon leaving the department: Stable    Disposition and Plan     Clinical Impression:  1. Paresthesias        Disposition:  Discharge    Follow-up:  Pattie Godfrey,   [address] [address]  [phone]    Schedule an appointment as soon as possible for a visit  If symptoms worsen      Medications Prescribed:  Current Discharge Medication List        START taking these medications    Details   Ketorolac Tromethamine 10 MG Oral Tab Take 1 tablet (10 mg total) by mouth every 6 (six) hours as needed.  Qty: 14 tablet, Refills: 0                      [1] (Not in a hospital admission)

## 2025-04-01 NOTE — TELEPHONE ENCOUNTER
The patient had a colonoscopy 4 months ago. Has hemorrhoids.    Should increase hydration, use miralax 2-3 capfuls per day. If not responsive to miralax can consider stepping up therapy to linzess.    May need steroid suppositories BUT ill not be covered by insurance, can send to [facility] here cost will be 42 dollars. This may help if she is willing to pay this amount for the suppositories.    Not sure what to make of the numbness/tingling --can further discuss with her PCP.

## 2025-04-01 NOTE — TELEPHONE ENCOUNTER
Spoke to the pt's daughter    She is aware Dr Mayer ordered the stool tests    She will go to the lab to  the stool kits

## 2025-04-01 NOTE — TELEPHONE ENCOUNTER
Dr Mayer,    I spoke to the pt's daughter    They have an appointment with Kenisha Dubose in [place]    Pt is very worried she has  parasite and would like her stool checked

## 2025-04-03 ENCOUNTER — HOSPITAL ENCOUNTER (OUTPATIENT)
Dept: ULTRASOUND IMAGING | Age: 60
Discharge: HOME | End: 2025-04-03
Attending: OBSTETRICS & GYNECOLOGY
Payer: MEDICAID

## 2025-04-03 ENCOUNTER — OFFICE VISIT (OUTPATIENT)
Dept: GASTROENTEROLOGY | Facility: CLINIC | Age: 60
End: 2025-04-03
Payer: MEDICAID

## 2025-04-03 ENCOUNTER — LAB ENCOUNTER (OUTPATIENT)
Dept: LAB | Age: 60
End: 2025-04-03
Attending: OBSTETRICS & GYNECOLOGY
Payer: MEDICAID

## 2025-04-03 VITALS
HEIGHT: 64 IN | BODY MASS INDEX: 50.02 KG/M2 | HEART RATE: 77 BPM | SYSTOLIC BLOOD PRESSURE: 133 MMHG | DIASTOLIC BLOOD PRESSURE: 86 MMHG | WEIGHT: 293 LBS

## 2025-04-03 DIAGNOSIS — K76.0 HEPATIC STEATOSIS: ICD-10-CM

## 2025-04-03 DIAGNOSIS — A04.8 HELICOBACTER PYLORI (H. PYLORI) INFECTION: ICD-10-CM

## 2025-04-03 DIAGNOSIS — R16.0 HEPATOMEGALY: ICD-10-CM

## 2025-04-03 DIAGNOSIS — K62.5 BRBPR (BRIGHT RED BLOOD PER RECTUM): ICD-10-CM

## 2025-04-03 DIAGNOSIS — D25.9 UTERINE LEIOMYOMA, UNSPECIFIED LOCATION: ICD-10-CM

## 2025-04-03 DIAGNOSIS — Z12.11 COLON CANCER SCREENING: ICD-10-CM

## 2025-04-03 DIAGNOSIS — K59.00 CONSTIPATION, UNSPECIFIED CONSTIPATION TYPE: ICD-10-CM

## 2025-04-03 DIAGNOSIS — R74.8 ELEVATED LIVER ENZYMES: Primary | ICD-10-CM

## 2025-04-03 DIAGNOSIS — R12 HEARTBURN: ICD-10-CM

## 2025-04-03 DIAGNOSIS — R10.84 GENERALIZED ABDOMINAL PAIN: ICD-10-CM

## 2025-04-03 LAB
CRYPTOSP AG STL QL IA: NEGATIVE
G LAMBLIA AG STL QL IA: NEGATIVE

## 2025-04-03 PROCEDURE — 99215 OFFICE O/P EST HI 40 MIN: CPT | Performed by: NURSE PRACTITIONER

## 2025-04-03 PROCEDURE — 87046 STOOL CULTR AEROBIC BACT EA: CPT

## 2025-04-03 PROCEDURE — 76856 US EXAM PELVIC COMPLETE: CPT | Performed by: OBSTETRICS & GYNECOLOGY

## 2025-04-03 PROCEDURE — 87272 CRYPTOSPORIDIUM AG IF: CPT

## 2025-04-03 PROCEDURE — 87493 C DIFF AMPLIFIED PROBE: CPT

## 2025-04-03 PROCEDURE — 87045 FECES CULTURE AEROBIC BACT: CPT

## 2025-04-03 PROCEDURE — 87427 SHIGA-LIKE TOXIN AG IA: CPT

## 2025-04-03 PROCEDURE — 87329 GIARDIA AG IA: CPT

## 2025-04-03 PROCEDURE — 87015 SPECIMEN INFECT AGNT CONCNTJ: CPT

## 2025-04-03 PROCEDURE — 76830 TRANSVAGINAL US NON-OB: CPT | Performed by: OBSTETRICS & GYNECOLOGY

## 2025-04-03 RX ORDER — HYDROCORTISONE 25 MG/G
1 CREAM TOPICAL 2 TIMES DAILY
Qty: 30 G | Refills: 0 | Status: SHIPPED | OUTPATIENT
Start: 2025-04-03 | End: 2025-04-17

## 2025-04-03 RX ORDER — SPIRONOLACTONE 25 MG/1
12.5 TABLET ORAL DAILY
COMMUNITY
Start: 2025-02-24

## 2025-04-03 NOTE — PATIENT INSTRUCTIONS
-miralax 1-2 capfuls daily  -metamucil 2-3 scoops/daily  -Squatty potty  -anusol cream twice daily x 2 weeks for hemorrhoids  -reflux diet modification  -avoid nsaids  -omeprazole 20 mg twice daily x 6-8 weeks  -low-fat diet  -healthy bmi  -monitor cholesterol, blood sugar, liver function testing with pcp and consider need for further work-up  -consider decreasing dose or stopping ozempic with prescriber  -er if condition decline    -return to clinic in 4 weeks or sooner if condition decline    Tips to Control Acid Reflux    To control acid reflux, you’ll need to make some basic diet and lifestyle changes. The simple steps outlined below may be all you’ll need to ease discomfort.   Watch what you eat  Don't have fatty foods or spicy foods.  Eat fewer acidic foods, such as citrus and tomato-based foods. These can increase symptoms.  Limit drinking alcohol, caffeine, and fizzy beverages. All increase acid reflux.  Try limiting chocolate, peppermint, and spearmint. These can make acid reflux worse in some people.     Watch when you eat  Don't lie down for 3 hours after eating.  Don't snack before going to bed.     Raise your head  Raising your head and upper body by 4 to 6 inches helps limit reflux when you’re lying down. Put blocks under the head of your bed frame or a wedge under your mattress to raise it.   Other changes  Lose weight, if you need to  Don’t exercise near bedtime  Don't wear tight-fitting clothes  Limit aspirin and ibuprofen  Stop smoking     [facility] last reviewed this educational content on 6/1/2019 © 2000-2020 [employer]. [address] [place] [address], [place] [address]. All rights reserved. This information is not intended as a substitute for professional medical care. Always follow your healthcare professional's instructions.

## 2025-04-03 NOTE — PROGRESS NOTES
[facility] - Gastroenterology                                                                                                               Reason for consult: f/u    Requesting physician or provider: Estelle Multani MD    Chief Complaint   Patient presents with    Blood In Stool     Epigastric pain that radiates to back on right side, constipation per patient.       HPI:   Judi White is a 60 year old year-old female with history of hld, anxiety, dm, htn, obesity, scoliosis, sleep apnea:     she is here today for f/U  An  was used for today's visit    Last seen 7/2024    History:  Pt seen by Dr. Mayer in clinic 11/2023  Denied brbpr at tov  No fhx gi malignancy  No previous c-scope  High risk given bmi >50  Plan for non-invasive crc screening     Had neg cologuard 12/9/23     EGD 11/2023 neg celiac  Neg HP  Hpylori neg 3/10/25    Was reporting dysphagia  Egd added to cln    Cln/egd 12/5/24    EGD Impression:  -Esophagus: LA grade B esophagitis. Hiatal hernia. Biopsies taken from the distal/proximal esophagus.  -Stomach: Unremarkable. Biopsied.  -Duodenum: Unremarkable.  -There was no foreign body visualized on this exam or stricture.   -Views of the hypopharynx are limited during EGD and the foreign body was reportedly swallowed 5 days ago so may be difficult to see with swelling and it could be embedded in the wall.     Colonoscopy findings:     1. 2 polyps noted as follows:      A. 2 mm polyp in the cecum; sessile morphology; cold forceps polypectomy performed, polyp retrieved.      B. 8 mm polyp in the transverse colon; sessile morphology; cold snare polypectomy performed, polyp retrieved. 3 hemostatic clips placed to prevent future bleeding.     2. Diverticulosis: no large diverticula appreciated.  3. Ileocecal valve appeared normal. Terminal ileum was visualized  4. The colonic mucosa throughout the  colon showed normal vascular pattern, without evidence of angioectasias or inflammation.   5. A retroflexed view of the rectum revealed small internal hemorrhoids.  6. STEVEN: normal rectal tone, no masses palpated.      Colonoscopy Impression:  2 polyps removed as outlined above.  Unremarkable terminal ileum.  Small hemorrhoids. No other source of rectal bleeding seen except for internal hemorrhoids.  Colon was otherwise normal with glistening mucosa and intact vascular pattern throughout.     Recommend:  Await pathology.   Start Omeprazole 40mg twice per day and take 30 to 60 minutes before both breakfast and dinner.  CT soft tissue neck, chest, abdomen/pelvis now given complaints of throat discomfort, neck discomfort abdominal discomfort and reported swallowing of a bone 1 week ago. I have ordered these stat for the patient to get done today at a facility that can do them today. Family is aware.       Final Diagnosis:      A. Gastric biopsy:  Fragments of gastric mucosa with moderate chronic active H. Pylori-associated gastritis.  Diff-Quik stain (with appropriate control reactivity) is positive for H. pylori microorganisms.     B. Distal esophagus; biopsy:  Fragments of squamous epithelium demonstrating changes compatible with mild reflux esophagitis.  No evidence of eosinophilic esophagitis, goblet cell intestinal metaplasia, dysplasia, or malignancy is identified.     C. Proximal esophagus; biopsy:  Multiple fragments of squamous epithelium demonstrating changes compatible with mild reflux esophagitis.  No evidence of eosinophilic esophagitis, goblet cell intestinal metaplasia, dysplasia, or malignancy is identified.     D. Cecal polyp x1; polypectomy:   Tubular adenoma.     E. Transverse colon polyp x1; polypectomy:   Tubular adenoma.     Ct neck 12/2024   w/ possible small bone in the hypopharynx that is not easily visualized during an EGD.   Referred to ent    Pos for hployri on egd    Treated with triple  therapy    Eradication testing neg 3/10/25 -OFF ppi    Was in ed for brbpr, has dark stools, epigastric pain in setting of ozempic use since feb  No clots. No melena  Has brbpr w/ bm. Feels prolpse of hemorrhoids with bm.   Eats beetroot in her salads  No sob, dizziness, fatigue    Mild elevation in lipase  Not anemic on cbc 3/29/25, 3/31/25 and hgb stable  Mild elevation in wbc  Ua unremarkable    Ct a/p 3/30/25  IMPRESSION:     1.  No acute abnormality in the abdomen or pelvis.  2.  Unchanged mild hepatomegaly.    FIT pos 3/29/25    Culture, Shigatoxin, giardia+crypto, c.diff pending     She says has bm every other day.  No diarrhea.  She describes gen abd pain.     she says has \"horrible\" heartburn when she is off ppi. She takes omeprazole twice daily and sx controlled. she denies dysphagia, odynophagia and/or globus. She has nausea this morning. No vomiting.  she denies recent change in appetite and/or unintentional weight loss.    No fever/chills    NSAIDS: no  Tobacco: no  Alcohol: no  Marijuana: no  Illicit drugs: no     No FH GI malignancy     No history of adverse reaction to sedation  EVANGELIST  No anticoagulants  No pacemaker/defibrillator  No pain medications and/or sleep aides       Wt Readings from Last 6 Encounters:   04/03/25 (!) 309 lb (140.2 kg)   03/18/25 (!) 302 lb 0.5 oz (137 kg)   02/20/25 (!) 318 lb (144.2 kg)   02/12/25 (!) 325 lb (147.4 kg)   12/30/24 (!) 312 lb (141.5 kg)   12/19/24 (!) 312 lb (141.5 kg)        History, Medications, Allergies, ROS:      Past Medical History:    Amenorrhea    Anxiety    Cataract    Diabetes (HCC)    High blood pressure    High cholesterol    Hypertension    Mixed hyperlipidemia    Obesity    Osteoarthritis    Scoliosis    Shortness of breath    Sleep apnea    needs new CPAP machine    Varicose vein      Past Surgical History:   Procedure Laterality Date    Colonoscopy N/A 12/04/2024    ; colon polyps,hemorrhoids    Colonoscopy N/A 12/04/2024    Procedure:  COLONOSCOPY;  Surgeon: Ghassan Mayer MD;  Location: [facility] ENDOSCOPY    Egd N/A 2024    ; esophagitis,Hiatal hernia      1989    Other surgical history      vein surgery    Upper gi endoscopy,exam        Family Hx:   Family History   Problem Relation Age of Onset    Cancer Brother 65        salivary gland cancer      Social History:   Social History     Socioeconomic History    Marital status:    Tobacco Use    Smoking status: Never     Passive exposure: Never    Smokeless tobacco: Never   Vaping Use    Vaping status: Never Used   Substance and Sexual Activity    Alcohol use: Not Currently    Drug use: Never    Sexual activity: Yes   Other Topics Concern    Caffeine Concern No    Exercise No    Seat Belt No    Special Diet No    Stress Concern No    Weight Concern Yes    Grew up on a farm Yes    History of tanning Yes    Outdoor occupation Yes    Breast feeding No    Reaction to local anesthetic No    Pt has a pacemaker No    Pt has a defibrillator No     Social Drivers of Health     Food Insecurity: Low Risk  (2025)    Received from [facility]    Food Insecurity     Have there been times that your food ran out, and you didn't have money to get more?: No     Are there times that you worry that this might happen?: No   Transportation Needs: Low Risk  (2025)    Received from [facility]    Transportation Needs     Do you have trouble getting transportation to medical appointments?: No   Housing Stability: Low Risk  (2025)    Received from [facility]    Housing Stability     Are you worried that your electric, gas, oil, or water might be shut off?: No     Are you concerned about having a safe and reliable place to live?: No        Medications (Active prior to today's visit):  Current Outpatient Medications   Medication Sig Dispense Refill    hydrocortisone 2.5 % External Cream Place 1 Application  rectally 2 (two) times daily for 14 days. Apply by topical route 2 times every day to the affected area(s) 30 g 0    semaglutide (OZEMPIC, 0.25 OR 0.5 MG/DOSE,) 2 MG/3ML Subcutaneous Solution Pen-injector Inject 0.5 mg into the skin once a week. Patient needs follow-up appointment prior to next refill 3 mL 0    Collagen-Vitamin C-Biotin (COLLAGEN OR) Take by mouth.      Omega-3 Fatty Acids (OMEGA-3 OR) Take by mouth.      amLODIPine 2.5 MG Oral Tab Take 1 tablet (2.5 mg total) by mouth daily.      losartan 50 MG Oral Tab Take 1 tablet (50 mg total) by mouth daily.      furosemide 40 MG Oral Tab Take 1 tablet (40 mg total) by mouth daily.      atorvastatin 10 MG Oral Tab Take 1 tablet (10 mg total) by mouth nightly. 90 tablet 3    spironolactone 25 MG Oral Tab Take 0.5 tablets (12.5 mg total) by mouth daily. (Patient not taking: Reported on 4/3/2025)      Ketorolac Tromethamine 10 MG Oral Tab Take 1 tablet (10 mg total) by mouth every 6 (six) hours as needed. (Patient not taking: Reported on 4/3/2025) 14 tablet 0    busPIRone HCl 7.5 MG Oral Tab Take 1 tablet (7.5 mg total) by mouth 3 (three) times daily as needed. (Patient not taking: Reported on 4/3/2025) 90 tablet 1    semaglutide (OZEMPIC, 0.25 OR 0.5 MG/DOSE,) 2 MG/3ML Subcutaneous Solution Pen-injector Inject 0.25 mg into the skin once a week. (Patient taking differently: Inject 0.25 mg into the skin once a week. Thursdays) 3 mL 0       Allergies:  Allergies[1]    ROS:   CONSTITUTIONAL: negative for fevers, chills, sweats and weight loss  EYES Negative for red eyes, yellow eyes, changes in vision  HEENT: Negative for dysphagia and hoarseness  RESPIRATORY: Negative for cough and shortness of breath  CARDIOVASCULAR: Negative for chest pain, palpitations  GASTROINTESTINAL: See HPI  GENITOURINARY: Negative for dysuria and frequency  MUSCULOSKELETAL: Negative for arthralgias and myalgias  NEUROLOGICAL: Negative for dizziness and headaches  BEHAVIOR/PSYCH: Negative  for anxiety and poor appetite    PHYSICAL EXAM:   Blood pressure 133/86, pulse 77, height 5' 4\" (1.626 m), weight (!) 309 lb (140.2 kg).    GEN: WD/WN, NAD  HEENT: Supple symmetrical, trachea midline  CV: RRR, the extremities are warm and well perfused   LUNGS: No increased work of breathing  ABDOMEN: No scars, normal bowel sounds, soft, non-tender, non-distended no rebound or guarding, no masses, no hepatomegaly  MSK: No redness, no warmth, no swelling of joints  SKIN: No jaundice, no erythema, no rashes  HEMATOLOGIC: No bleeding, no bruising  NEURO: Alert and interactive, normal gait    Labs/Imaging/Procedures:     Patient's pertinent labs and imaging were reviewed and discussed with patient today.        .  ASSESSMENT/PLAN:   Judi White is a 60 year old year-old female with history of hld, anxiety, dm, htn, obesity, scoliosis, sleep apnea:     #crc screening  Colon polyps x 2 removed. One right sided polyp. Path c/w TA. Consider c-scope crc screening in 5 years.     #hpylori   Pos on egd path. Treated with triple therapy. Eradication testing neg 3/2025 OFF ppi x 2 weeks.      #heartburn  #abd pain  She reports heartburn, gen abd pain.  Sx seemingly worse since starting ozempic.  Suspect constipation likely contributing to sx.  Mild elevation in lipase in ed. Ct w/o concerning finding. Nausea seemingly isolated event. No vomiting.  Sx improved on omeprazole and suggest continuing therapy at present.    #brbpr  #constipation  Suspect related to hemorrhoids seen on c-scope 12/2024. She describes bm every other day and suspect constipation likely contributing to sx. Recent cbc x 2 neg for anemia. Hgb stable.  Suspect brbpr,  pos fit related to hemorrhoids seen on c-scope 12/2024.  Recommendations below.     #hepatomegaly  #hepatic steatosis  #elevated liver enzymes  Suspect nafld given ct, ultrasound results. Chronic liver disease w/U completed by pcp.  Not immune to hep b and advised considering vaccine with  primary care provider.  Additional recommendations below.     -miralax 1-2 capfuls daily  -metamucil 2-3 scoops/daily  -Squatty potty  -anusol cream twice daily x 2 weeks for hemorrhoids  -reflux diet modification  -avoid nsaids  -omeprazole 20 mg twice daily x 6-8 weeks  -low-fat diet  -healthy bmi  -monitor cholesterol, blood sugar, liver function testing with pcp and consider need for further work-up  -consider decreasing dose or stopping ozempic with prescriber  -er if condition decline    -return to clinic in 4 weeks or sooner if condition decline    Orders This Visit:  No orders of the defined types were placed in this encounter.      Meds This Visit:  Requested Prescriptions     Signed Prescriptions Disp Refills    hydrocortisone 2.5 % External Cream 30 g 0     Sig: Place 1 Application rectally 2 (two) times daily for 14 days. Apply by topical route 2 times every day to the affected area(s)       Imaging & Referrals:  None      Kenisha Salinas, EDA   4/3/2025        This note was partially prepared using Dragon [employer] voice recognition dictation software. As a result, errors may occur. When identified, these errors have been corrected. While every attempt is made to correct errors during dictation, discrepancies may still exist.          [1]   Allergies  Allergen Reactions    Trulicity [Dulaglutide] NAUSEA ONLY    Lactose ITCHING

## 2025-04-04 ENCOUNTER — OFFICE VISIT (OUTPATIENT)
Dept: INTERNAL MEDICINE CLINIC | Facility: CLINIC | Age: 60
End: 2025-04-04
Payer: MEDICAID

## 2025-04-04 ENCOUNTER — TELEPHONE (OUTPATIENT)
Dept: FAMILY MEDICINE CLINIC | Facility: CLINIC | Age: 60
End: 2025-04-04

## 2025-04-04 ENCOUNTER — LAB ENCOUNTER (OUTPATIENT)
Dept: LAB | Age: 60
End: 2025-04-04
Attending: INTERNAL MEDICINE
Payer: MEDICAID

## 2025-04-04 ENCOUNTER — MED REC SCAN ONLY (OUTPATIENT)
Dept: FAMILY MEDICINE CLINIC | Facility: CLINIC | Age: 60
End: 2025-04-04

## 2025-04-04 VITALS
WEIGHT: 293 LBS | HEART RATE: 81 BPM | DIASTOLIC BLOOD PRESSURE: 84 MMHG | SYSTOLIC BLOOD PRESSURE: 123 MMHG | HEIGHT: 64 IN | BODY MASS INDEX: 50.02 KG/M2

## 2025-04-04 DIAGNOSIS — D72.10 EOSINOPHILIA, UNSPECIFIED TYPE: ICD-10-CM

## 2025-04-04 DIAGNOSIS — R53.1 GENERALIZED WEAKNESS: Primary | ICD-10-CM

## 2025-04-04 DIAGNOSIS — E11.9 TYPE 2 DIABETES MELLITUS WITHOUT COMPLICATION, WITHOUT LONG-TERM CURRENT USE OF INSULIN (HCC): ICD-10-CM

## 2025-04-04 DIAGNOSIS — K62.5 BRBPR (BRIGHT RED BLOOD PER RECTUM): ICD-10-CM

## 2025-04-04 DIAGNOSIS — R53.1 GENERALIZED WEAKNESS: ICD-10-CM

## 2025-04-04 DIAGNOSIS — I10 PRIMARY HYPERTENSION: ICD-10-CM

## 2025-04-04 LAB
BASOPHILS # BLD AUTO: 0.04 X10(3) UL (ref 0–0.2)
BASOPHILS NFR BLD AUTO: 0.4 %
C DIFF TOX B STL QL: NEGATIVE
DEPRECATED RDW RBC AUTO: 47.8 FL (ref 35.1–46.3)
EOSINOPHIL # BLD AUTO: 0.19 X10(3) UL (ref 0–0.7)
EOSINOPHIL NFR BLD AUTO: 1.8 %
ERYTHROCYTE [DISTWIDTH] IN BLOOD BY AUTOMATED COUNT: 15.4 % (ref 11–15)
HCT VFR BLD AUTO: 39.8 %
HGB BLD-MCNC: 12.3 G/DL
IMM GRANULOCYTES # BLD AUTO: 0.03 X10(3) UL (ref 0–1)
IMM GRANULOCYTES NFR BLD: 0.3 %
LYMPHOCYTES # BLD AUTO: 3.06 X10(3) UL (ref 1–4)
LYMPHOCYTES NFR BLD AUTO: 28.7 %
MCH RBC QN AUTO: 26.3 PG (ref 26–34)
MCHC RBC AUTO-ENTMCNC: 30.9 G/DL (ref 31–37)
MCV RBC AUTO: 85.2 FL
MONOCYTES # BLD AUTO: 0.8 X10(3) UL (ref 0.1–1)
MONOCYTES NFR BLD AUTO: 7.5 %
NEUTROPHILS # BLD AUTO: 6.55 X10 (3) UL (ref 1.5–7.7)
NEUTROPHILS # BLD AUTO: 6.55 X10(3) UL (ref 1.5–7.7)
NEUTROPHILS NFR BLD AUTO: 61.3 %
PLATELET # BLD AUTO: 262 10(3)UL (ref 150–450)
RBC # BLD AUTO: 4.67 X10(6)UL
WBC # BLD AUTO: 10.7 X10(3) UL (ref 4–11)

## 2025-04-04 PROCEDURE — 85025 COMPLETE CBC W/AUTO DIFF WBC: CPT

## 2025-04-04 PROCEDURE — 36415 COLL VENOUS BLD VENIPUNCTURE: CPT

## 2025-04-04 PROCEDURE — 99214 OFFICE O/P EST MOD 30 MIN: CPT | Performed by: INTERNAL MEDICINE

## 2025-04-04 PROCEDURE — 87101 SKIN FUNGI CULTURE: CPT

## 2025-04-04 RX ORDER — OMEPRAZOLE 20 MG/1
20 CAPSULE, DELAYED RELEASE ORAL
COMMUNITY

## 2025-04-04 RX ORDER — NYSTATIN 100000 [USP'U]/ML
5 SUSPENSION ORAL 2 TIMES DAILY
Qty: 473 ML | Refills: 0 | Status: SHIPPED | OUTPATIENT
Start: 2025-04-04

## 2025-04-04 RX ORDER — METFORMIN HYDROCHLORIDE 500 MG/1
500 TABLET, EXTENDED RELEASE ORAL DAILY
Qty: 90 TABLET | Refills: 3 | Status: SHIPPED | OUTPATIENT
Start: 2025-04-04

## 2025-04-04 NOTE — PROGRESS NOTES
Judi White is a 60 year old female.  Chief Complaint   Patient presents with    ER F/U     Patient seen at [facility] ER on 03/31 for chief complaint of numbness      HPI:    Ukranian  utilized   Daughter also present on the phone and helping translate.    Patient states that she was recently taken tto the hospital for bloo din stool. She was having dark stool with light red bloos, but now the stool is clear. She will be following up with GI.     Patient states that she feels over all unwell. She thinks she has fungi in her blood. She states that back in [facility] she was tested for it once and got treated for it. She feels like her overr all symptoms arre because of fungus in the blood. She wants to get tested. I explained it to her that fungal infection in blood presents differently and is considered a serious infection. She does not have any fever, or other infection symptoms. She also states that she has fungus on her tongue. The nystatin suspension helped her a little bit. She would like a refill. She is cconvinced that the fungus in the blood will cause her to have cancer in the long run, and she is on the path of catching cancerr. She wants to be checked for cancer.     Need newe glucose machine and supplies.         Current Outpatient Medications   Medication Sig Dispense Refill    omeprazole 20 MG Oral Capsule Delayed Release Take 1 capsule (20 mg total) by mouth 2 (two) times daily before meals. Twice a day      metFORMIN  MG Oral Tablet 24 Hr Take 1 tablet (500 mg total) by mouth daily. 90 tablet 3    nystatin 111121 UNIT/ML Mouth/Throat Suspension Take 5 mL (500,000 Units total) by mouth in the morning and 5 mL (500,000 Units total) before bedtime. 473 mL 0    Collagen-Vitamin C-Biotin (COLLAGEN OR) Take by mouth.      Omega-3 Fatty Acids (OMEGA-3 OR) Take by mouth.      amLODIPine 2.5 MG Oral Tab Take 1 tablet (2.5 mg total) by mouth daily.      losartan 50 MG Oral Tab Take 1  tablet (50 mg total) by mouth daily.      furosemide 40 MG Oral Tab Take 1 tablet (40 mg total) by mouth daily.      atorvastatin 10 MG Oral Tab Take 1 tablet (10 mg total) by mouth nightly. 90 tablet 3    spironolactone 25 MG Oral Tab Take 0.5 tablets (12.5 mg total) by mouth daily. (Patient not taking: Reported on 2025)      busPIRone HCl 7.5 MG Oral Tab Take 1 tablet (7.5 mg total) by mouth 3 (three) times daily as needed. (Patient not taking: Reported on 2025) 90 tablet 1      Past Medical History:    Amenorrhea    Anxiety    Cataract    Diabetes (HCC)    High blood pressure    High cholesterol    Hypertension    Mixed hyperlipidemia    Obesity    Osteoarthritis    Scoliosis    Shortness of breath    Sleep apnea    needs new CPAP machine    Varicose vein      Past Surgical History:   Procedure Laterality Date    Colonoscopy N/A 2024    ; colon polyps,hemorrhoids    Colonoscopy N/A 2024    Procedure: COLONOSCOPY;  Surgeon: Ghassan Mayer MD;  Location: [facility] ENDOSCOPY    Egd N/A 2024    ; esophagitis,Hiatal hernia      1989    Other surgical history      vein surgery    Upper gi endoscopy,exam        Social History:  Social History     Socioeconomic History    Marital status:    Tobacco Use    Smoking status: Never     Passive exposure: Never    Smokeless tobacco: Never   Vaping Use    Vaping status: Never Used   Substance and Sexual Activity    Alcohol use: Not Currently    Drug use: Never    Sexual activity: Yes   Other Topics Concern    Caffeine Concern No    Exercise No    Seat Belt No    Special Diet No    Stress Concern No    Weight Concern Yes    Grew up on a farm Yes    History of tanning Yes    Outdoor occupation Yes    Breast feeding No    Reaction to local anesthetic No    Pt has a pacemaker No    Pt has a defibrillator No     Social Drivers of Health     Food Insecurity: Low Risk  (2025)    Received from [facility]    Food Insecurity     Have there been times that your food ran out, and you didn't have money to get more?: No     Are there times that you worry that this might happen?: No   Transportation Needs: Low Risk  (1/28/2025)    Received from [facility]    Transportation Needs     Do you have trouble getting transportation to medical appointments?: No   Housing Stability: Low Risk  (1/28/2025)    Received from [facility]    Housing Stability     Are you worried that your electric, gas, oil, or water might be shut off?: No     Are you concerned about having a safe and reliable place to live?: No      Family History   Problem Relation Age of Onset    Cancer Brother 65        salivary gland cancer      Allergies[1]     REVIEW OF SYSTEMS:   Review of Systems   Review of Systems   Constitutional: Negative for activity change, appetite change and fever.   HENT: Negative for congestion and voice change.    Respiratory: Negative for cough and shortness of breath.    Cardiovascular: Negative for chest pain.   Gastrointestinal: Negative for abdominal distention, abdominal pain and vomiting.   Genitourinary: Negative for hematuria.   Skin: Negative for wound.   Psychiatric/Behavioral: Negative for behavioral problems.   Wt Readings from Last 5 Encounters:   04/10/25 (!) 308 lb (139.7 kg)   04/04/25 (!) 308 lb (139.7 kg)   04/03/25 (!) 309 lb (140.2 kg)   03/18/25 (!) 302 lb 0.5 oz (137 kg)   02/20/25 (!) 318 lb (144.2 kg)     Body mass index is 52.87 kg/m².      EXAM:   /84   Pulse 81   Ht 5' 4\" (1.626 m)   Wt (!) 308 lb (139.7 kg)   BMI 52.87 kg/m²   Physical Exam   Constitutional:       Appearance: Normal appearance.   HENT:      Head: Normocephalic.   Eyes:      Conjunctiva/sclera: Conjunctivae normal.   Breast:  Normal bilateral breast exam. No palpable masses or nodules.   No nipples asymmetry or discharge. No skin changes   Cardiovascular:      Rate and Rhythm:  Normal rate and regular rhythm.      Heart sounds: Normal heart sounds. No murmur heard.  Pulmonary:      Effort: Pulmonary effort is normal.      Breath sounds: Normal breath sounds. No rhonchi or rales.   Abdominal:      General: Bowel sounds are normal.      Palpations: Abdomen is soft.      Tenderness: There is no abdominal tenderness.   Musculoskeletal:      Cervical back: Neck supple.      Right lower leg: No edema.      Left lower leg: No edema.   Skin:     General: Skin is warm and dry.   Neurological:      General: No focal deficit present.      Mental Status: He is alert and oriented to person, place, and time. Mental status is at baseline.   Psychiatric:         Mood and Affect: Mood normal.         Behavior: Behavior normal.       ASSESSMENT AND PLAN:     Assessment & Plan  Generalized weakness  - will check fungal culture as per patient request, although reassured her that it is likely not fungal infection.   - she is upto date with her colon and breast screening   Orders:    Fungus Culture-Blood [E]; Future    Eosinophilia, unspecified type  - recheck labs   Orders:    CBC W Differential W Platelet [E]; Future    Type 2 diabetes mellitus without complication, without long-term current use of insulin (HCC)  - continue metformin   - new glucometer ordered   Orders:    metFORMIN  MG Oral Tablet 24 Hr; Take 1 tablet (500 mg total) by mouth daily.    Diabetic Test Strips and Supplies    Primary hypertension  - well controlled  - check blood pressure athome and keep a log   Orders:    DME - External    BRBPR (bright red blood per rectum)  - has follow up with gastro        The patient indicates understanding of these issues and agrees to the plan.  Follow up with pcp in 3 months     Ashleigh Herrera MD     This note was created by Dragon voice recognition. Errors in content may be related to improper recognition by the system; efforts to review and correct have been done but errors may still exist.  Please be advised the primary purpose of this note is for me to communicate medical care. Standard sentence structure is not always used. Medical terminology and medical abbreviations may be used. There may be grammatical, typographical, and automated fill ins that may have errors missed in proofreading.          [1]   Allergies  Allergen Reactions    Trulicity [Dulaglutide] NAUSEA ONLY    Lactose ITCHING

## 2025-04-04 NOTE — TELEPHONE ENCOUNTER
Received Discharge forms from [facility], will leave for Rangel Hernandez to review and sign

## 2025-04-05 ENCOUNTER — TELEPHONE (OUTPATIENT)
Dept: INTERNAL MEDICINE CLINIC | Facility: CLINIC | Age: 60
End: 2025-04-05

## 2025-04-05 NOTE — TELEPHONE ENCOUNTER
Order Questions    Question Answer   What DME is needed: blood pressure monitor   Notify staff to enter order in [place]? Yes     This is not a parachute item. Will need to call patient and let her know.

## 2025-04-08 NOTE — TELEPHONE ENCOUNTER
Called patient with language line  ID # [id number], no answer.  Voicemail box has not been set up, unable to leave message

## 2025-04-10 ENCOUNTER — OFFICE VISIT (OUTPATIENT)
Dept: PHYSICAL MEDICINE AND REHAB | Facility: CLINIC | Age: 60
End: 2025-04-10
Payer: MEDICAID

## 2025-04-10 VITALS — BODY MASS INDEX: 50.02 KG/M2 | WEIGHT: 293 LBS | HEIGHT: 64 IN

## 2025-04-10 DIAGNOSIS — M51.26 HNP (HERNIATED NUCLEUS PULPOSUS), LUMBAR: ICD-10-CM

## 2025-04-10 DIAGNOSIS — M54.16 LEFT LUMBAR RADICULOPATHY: Primary | ICD-10-CM

## 2025-04-10 DIAGNOSIS — M47.816 LUMBAR FACET ARTHROPATHY: ICD-10-CM

## 2025-04-10 DIAGNOSIS — E66.01 MORBID OBESITY WITH BMI OF 50.0-59.9, ADULT (HCC): ICD-10-CM

## 2025-04-10 PROCEDURE — 99213 OFFICE O/P EST LOW 20 MIN: CPT | Performed by: PHYSICAL MEDICINE & REHABILITATION

## 2025-04-10 NOTE — PROGRESS NOTES
[facility]  OFFICE FOLLOW UP EVALUATION      HISTORY OF PRESENT ILLNESS:     Chief Complaint   Patient presents with    Follow - Up     LOV 01/23/25 Patient is here to follow up on low back pain after injections. Patient reports 1000% improvement and states the pain is dull ache, Pain 0/10. Denies N/T. Denies weakness. Denies pain medication and muscle relaxer's.       History of Present Illness  The patient, with a history of nerve pain, presents for a follow-up after a recent left lumbar L5 and S1 transforaminal epidural steroid injection. She reports significant improvement in her symptoms, stating that the injections 'really helped me.' This suggests that the pain was largely due to nerve involvement. She has been adhering to her home exercises and is not currently taking any medication apart from her heart medication. She also mentions having osteoporosis and inquires about additional treatments or exercises for this condition. The patient was previously on Lyrica twice a day, but she has since discontinued this medication. She is also advised to continue with her weight loss efforts.      PHYSICAL EXAM:   Ht 64\"   Wt (!) 308 lb (139.7 kg)   BMI 52.87 kg/m²     Gait  Able to toe walk and heel walk without any difficulty     LUMBAR SPINE:  Inspection: no erythema, swelling, or obvious deformity.  Their iliac crest and shoulder heights are symmetrical.     Palpation: Non tender to palpation of the spinous process.   ROM: FAROM   Strength: 5/5 in bilateral lower extremities  Sensation: Intact to light touch in all dermatomes of the lower extremities  Reflexes: 2/4 at L4 and S1  Facet Loading: Negative bilateral lower lumbar facet joints  Straight leg raise: negative for radicular pain symptoms  Slump test: negative for pain symptoms for radicular pain symptoms       IMAGING:     MRI lumbar spine completed on 10/3/2024 was personally reviewed which is notable for  mild degenerative changes at L4-5 there is a broad-based degenerative disc bulge with moderate bilateral facet arthropathy and mild bilateral neuroforaminal narrowing. There is moderate broad-based degenerative disc bulge at L5-S1 eccentric to the left posterior lateral aspect of the disc space. There is moderate left neuroforaminal narrowing. The facet joint degenerative changes as well.     All imaging results were reviewed and discussed with patient.      ASSESSMENT/PLAN:     1. Left lumbar radiculopathy    2. Morbid obesity with BMI of 50.0-59.9, adult (HCC)    3. Lumbar facet arthropathy    4. HNP (herniated nucleus pulposus), lumbar        Assessment & Plan  Left lumbar radiculopathy  Significant symptom improvement post-injection indicates nerve-related pain. Discontinued Lyrica. Emphasized weight loss and ice/heat therapy.  - Continue home exercises for lower back.  - Use ice and heat therapy as needed.  - Maintain weight loss efforts.  - No follow-up appointment scheduled due to relocation to [facility].      The patient verbalized understanding with the plan and was in agreement. All questions/concerns were addressed and there were no barriers to learning.  Please note Dragon dictation software was used to dictate this note and may result in inadvertent typos.    Chantel De La Rosa DO, FAAPMR & CAQSM  Physical Medicine and Rehabilitation  Sports and Spine Medicine    PAST MEDICAL HISTORY:   Past Medical History[1]      PAST SURGICAL HISTORY:   Past Surgical History[2]      CURRENT MEDICATIONS:   Current Medications[3]      ALLERGIES:   Allergies[4]      FAMILY HISTORY:   Family History[5]       SOCIAL HISTORY:   Short Social Hx on File[6]       REVIEW OF SYSTEMS:   A comprehensive 10 point review of systems was completed.  Pertinent positives and negatives noted in the the HPI.      LABS:     Lab Results   Component Value Date     (H) 02/26/2025    A1C 6.5 (H) 02/26/2025     Lab Results   Component Value Date     WBC 10.7 2025    RBC 4.67 2025    HGB 12.3 2025    HCT 39.8 2025    MCV 85.2 2025    MCH 26.3 2025    MCHC 30.9 (L) 2025    RDW 15.4 (H) 2025    .0 2025     Lab Results   Component Value Date     (H) 2025    BUN 12 2025    BUNCREA 16.7 2025    CREATSERUM 0.72 2025    ANIONGAP 9 2025    CA 8.6 (L) 2025    OSMOCALC 287 2025    ALKPHO 80 2025    AST 30 2025    ALT 51 (H) 2025    BILT 0.3 2025    TP 7.4 2025    ALB 4.4 2025    GLOBULIN 3.0 2025     2025    K 4.2 2025     2025    CO2 25.0 2025     No results found for: \"PTP\", \"PT\", \"INR\"  Lab Results   Component Value Date    VITD 38.1 2024            [1]   Past Medical History:   Amenorrhea    Anxiety    Cataract    Diabetes (HCC)    High blood pressure    High cholesterol    Hypertension    Mixed hyperlipidemia    Obesity    Osteoarthritis    Scoliosis    Shortness of breath    Sleep apnea    needs new CPAP machine    Varicose vein   [2]   Past Surgical History:  Procedure Laterality Date    Colonoscopy N/A 2024    ; colon polyps,hemorrhoids    Colonoscopy N/A 2024    Procedure: COLONOSCOPY;  Surgeon: Ghassan Mayer MD;  Location: [facility] ENDOSCOPY    Egd N/A 2024    ; esophagitis,Hiatal hernia      1989    Other surgical history      vein surgery    Upper gi endoscopy,exam     [3]   Current Outpatient Medications   Medication Sig Dispense Refill    omeprazole 20 MG Oral Capsule Delayed Release Take 1 capsule (20 mg total) by mouth 2 (two) times daily before meals. Twice a day      metFORMIN  MG Oral Tablet 24 Hr Take 1 tablet (500 mg total) by mouth daily. 90 tablet 3    nystatin 832183 UNIT/ML Mouth/Throat Suspension Take 5 mL (500,000 Units total) by mouth in the morning and 5 mL (500,000 Units total) before bedtime.  473 mL 0    spironolactone 25 MG Oral Tab Take 0.5 tablets (12.5 mg total) by mouth daily. (Patient not taking: Reported on 4/4/2025)      hydrocortisone 2.5 % External Cream Place 1 Application rectally 2 (two) times daily for 14 days. Apply by topical route 2 times every day to the affected area(s) 30 g 0    Collagen-Vitamin C-Biotin (COLLAGEN OR) Take by mouth.      Omega-3 Fatty Acids (OMEGA-3 OR) Take by mouth.      amLODIPine 2.5 MG Oral Tab Take 1 tablet (2.5 mg total) by mouth daily.      losartan 50 MG Oral Tab Take 1 tablet (50 mg total) by mouth daily.      furosemide 40 MG Oral Tab Take 1 tablet (40 mg total) by mouth daily.      busPIRone HCl 7.5 MG Oral Tab Take 1 tablet (7.5 mg total) by mouth 3 (three) times daily as needed. (Patient not taking: Reported on 4/4/2025) 90 tablet 1    atorvastatin 10 MG Oral Tab Take 1 tablet (10 mg total) by mouth nightly. 90 tablet 3   [4]   Allergies  Allergen Reactions    Trulicity [Dulaglutide] NAUSEA ONLY    Lactose ITCHING   [5]   Family History  Problem Relation Age of Onset    Cancer Brother 65        salivary gland cancer   [6]   Social History  Socioeconomic History    Marital status:    Tobacco Use    Smoking status: Never     Passive exposure: Never    Smokeless tobacco: Never   Vaping Use    Vaping status: Never Used   Substance and Sexual Activity    Alcohol use: Not Currently    Drug use: Never    Sexual activity: Yes   Other Topics Concern    Caffeine Concern No    Exercise No    Seat Belt No    Special Diet No    Stress Concern No    Weight Concern Yes    Grew up on a farm Yes    History of tanning Yes    Outdoor occupation Yes    Breast feeding No    Reaction to local anesthetic No    Pt has a pacemaker No    Pt has a defibrillator No     Social Drivers of Health     Food Insecurity: Low Risk  (1/28/2025)    Received from [facility]    Food Insecurity     Have there been times that your food ran out, and you didn't have  money to get more?: No     Are there times that you worry that this might happen?: No   Transportation Needs: Low Risk  (1/28/2025)    Received from [facility]    Transportation Needs     Do you have trouble getting transportation to medical appointments?: No   Housing Stability: Low Risk  (1/28/2025)    Received from [facility]    Housing Stability     Are you worried that your electric, gas, oil, or water might be shut off?: No     Are you concerned about having a safe and reliable place to live?: No

## 2025-04-19 ENCOUNTER — TELEPHONE (OUTPATIENT)
Dept: INTERNAL MEDICINE CLINIC | Facility: CLINIC | Age: 60
End: 2025-04-19

## 2025-04-19 NOTE — TELEPHONE ENCOUNTER
Home health certification Order # [id number]   signed.  Fax#   [phone] Fax confirmation rec'd.

## 2025-04-23 ENCOUNTER — TELEPHONE (OUTPATIENT)
Dept: INTERNAL MEDICINE CLINIC | Facility: CLINIC | Age: 60
End: 2025-04-23

## 2025-04-23 NOTE — TELEPHONE ENCOUNTER
Patient has O2 Equipment in the home and per dtr patient was told patient no longer needs the 02.  Oxygen was delivered after visit at [facility] where her pulmonary providers are out of.     Patient dtr asking for letter saying she no longer needs this 02 and the company will come to pick it up.   Advised this is best to come from pulmonary since they indicated she no longer needs it and they are likely ones who ordered in Feb when it was delivered.     Dtr will call pulmonary provider and group but if they are unable to assist she will call us back so we can check with Dr. Multani.     Dtr agrees to plan, no other questions at this time.

## (undated) DEVICE — MEDI-VAC NON-CONDUCTIVE SUCTION TUBING 6MM X 1.8M (6FT.) L: Brand: CARDINAL HEALTH

## (undated) DEVICE — PEN 6" SURGICAL MARKING PURPL

## (undated) DEVICE — CO2 CANNULA,SSOFT,ADLT,7O2,4CO2,FEMALE: Brand: MEDLINE

## (undated) DEVICE — SPONGE 4X4 10PK

## (undated) DEVICE — FORCEPS BX L240CM DIA2.4MM L NDL RAD JAW 4

## (undated) DEVICE — SYRINGE MED 10ML LL TIP W/O SFTY DISP

## (undated) DEVICE — 6 ML SYRINGE LUER-LOCK TIP: Brand: MONOJECT

## (undated) DEVICE — 4-WAY HIGH FLOW STOPCOCK W/ROTATING LUER: Brand: ICU MEDICAL

## (undated) DEVICE — SYRINGE, LUER SLIP, STERILE, 60ML: Brand: MEDLINE

## (undated) DEVICE — KIT CLEAN ENDOKIT 1.1OZ GOWNX2

## (undated) DEVICE — STANDARD HYPODERMIC NEEDLE,ALUMINUM HUB: Brand: MONOJECT

## (undated) DEVICE — STERILE LATEX POWDER-FREE SURGICAL GLOVESWITH NITRILE COATING: Brand: PROTEXIS

## (undated) DEVICE — Device: Brand: DUAL NARE NASAL CANNULAE FEMALE LUER CON 7FT O2 TUBE

## (undated) DEVICE — STANDARD HYPODERMIC NEEDLE,POLYPROPYLENE HUB: Brand: MONOJECT

## (undated) DEVICE — INTENDED USE FOR SURGICAL MARKING ON INTACT SKIN, ALSO PROVIDES A PERMANENT METHOD OF IDENTIFYING OBJECTS IN THE OPERATING ROOM: Brand: WRITESITE® PLUS MINI PREP RESISTANT MARKER

## (undated) DEVICE — KIT ENDO ORCAPOD 160/180/190

## (undated) DEVICE — APPLICATOR PREP 10.5ML ORNG CHG 2% ISO ALC

## (undated) DEVICE — PAIN BLOCK TRAY: Brand: CARDINAL HEALTH

## (undated) DEVICE — 60 ML SYRINGE REGULAR TIP: Brand: MONOJECT

## (undated) DEVICE — YANKAUER SUCTION INSTRUMENT NO CONTROL VENT, BULB TIP, CLEAR: Brand: YANKAUER

## (undated) DEVICE — 12 ML SYRINGE LUER-LOCK TIP: Brand: MONOJECT

## (undated) DEVICE — GIJAW SINGLE-USE BIOPSY FORCEPS WITH NEEDLE: Brand: GIJAW

## (undated) DEVICE — COVER,MAYO STAND,STERILE: Brand: MEDLINE

## (undated) DEVICE — SET EXTN 2.7ML TBNG L20IN DIA0.100IN MACBOR

## (undated) DEVICE — MEDI-VAC NON-CONDUCTIVE SUCTION TUBING: Brand: CARDINAL HEALTH

## (undated) DEVICE — MCKESSON QUINCKE POINT SPINAL NEEDLE, 22GX7: Brand: MCKESSON

## (undated) DEVICE — CONMED SCOPE SAVER BITE BLOCK, 20X27 MM: Brand: SCOPE SAVER

## (undated) DEVICE — V2 SPECIMEN COLLECTION TRAY: Brand: NEPTUNE

## (undated) DEVICE — TRAY EPI NDL 18GA L3.5IN 5ML GLS LUERLOCK LOR

## (undated) DEVICE — V2 SPECIMEN COLLECTION MANIFOLD KIT: Brand: NEPTUNE

## (undated) DEVICE — LASSO POLYPECTOMY SNARE: Brand: LASSO

## (undated) DEVICE — ZZ-DISC NO SUB - NEEDLE HYPODERMIC 27GA L1.25IN REGULAR BEVEL WALL SPECIALTY DISPOSABLE PRECISIONGLI

## (undated) DEVICE — TOWEL,OR,DSP,ST,BLUE,DLX,2/PK,40PK/CS: Brand: MEDLINE

## (undated) DEVICE — GAMMEX® PI HYBRID SIZE 7, STERILE POWDER-FREE SURGICAL GLOVE, POLYISOPRENE AND NEOPRENE BLEND: Brand: GAMMEX

## (undated) NOTE — LETTER
2/13/2025          Judi White    [address]    [place] [address]         Dear Judi,    Our records indicate that the tests ordered for you by Ghassan Mayer MD  have not been done.  If you have, in fact, already completed the tests or you do not wish to have the tests done, please contact our office at THE NUMBER LISTED BELOW.  Otherwise, please proceed with the testing.  Enclosed is a duplicate order for your convenience.    Helicobacter Pylori Breath Test, Adult     Sincerely,    Ghassan Mayer MD  [address]  [facility] [id number] [phone]

## (undated) NOTE — LETTER
[facility] [place]  After Care Instructions for Endometrial Biopsy      Biopsy Results   You will receive a phone call with your biopsy results in 7 business days.  If you have not received your results in 7 days, please contact our office.  The results of your biopsy will determine if further treatment will be necessary.    Bleeding   You may have some light bleeding or blackish clumpy discharge for several days after your biopsy.    Restrictions    You should avoid intercourse or tampon use for 1 day after your biopsy.    Pain    You may experience mild menstrual cramping after your biopsy.  You may use Ibuprofen, Aleve or Tylenol to relieve your discomfort.  If you experience severe or persistent pain contact our office.      If you have any additional questions, please call us at [phone].

## (undated) NOTE — LETTER
EMERGENCY TRANSPORT INFORMATION    Patient:  Judi White   :  1965   Address:  [address] [place] [address]  Phone:  [phone] (home) [phone] (work)   Insurance:  Payor: MEDICAID REPLACEMENT / Plan: [place] [employer] [facility] PLANS / Product Type: *No Product type* /     Secondary Insurance:  N/A  Provider:  EDA Manzanares   Allergies:    Allergies   Allergen Reactions    Trulicity [Dulaglutide] NAUSEA ONLY    Lactose ITCHING      Current Outpatient Medications   Medication Sig Dispense Refill    AMOXICILLIN OR Take 2 500mg tablets by mouth twice a day      clarithromycin 500 MG Oral Tab Take 1 tablet (500 mg total) by mouth 2 (two) times daily.      Omeprazole 40 MG Oral Capsule Delayed Release Take 1 capsule (40 mg total) by mouth 2 (two) times daily before meals. 180 capsule 0    Insulin Pen Needle (PEN NEEDLES) 32G X 4 MM Does not apply Misc 1 each daily. 90 each 0    valsartan 160 MG Oral Tab Take 1 tablet (160 mg total) by mouth daily. 90 tablet 3    ketoconazole 2 % External Cream Apply to affected area twice daily 30 g 0    metFORMIN  MG Oral Tablet 24 Hr Take 1 tablet (750 mg total) by mouth daily. 90 tablet 2    Blood Pressure Monitoring Does not apply Kit Check BP twice daily 1 kit 0    semaglutide (OZEMPIC, 0.25 OR 0.5 MG/DOSE,) 2 MG/3ML Subcutaneous Solution Pen-injector Inject 0.25 mg into the skin once a week. (Patient not taking: Reported on 2024) 3 mL 0    gabapentin 300 MG Oral Cap Take 1 capsule (300 mg total) by mouth As Directed. Take 1 capsule (300 mg total) nightly for 3 days, if tolerated, add morning dose of 1 capsule (300 mg total) for 3 days and if tolerated add afternoon dose of 1 capsule (300 mg ) .  NOTE: increase doses slowly to minimize side effects. (Patient not taking: Reported on 2024) 90 capsule 0    atorvastatin 10 MG Oral Tab Take 1 tablet (10 mg total) by mouth nightly. (Patient not taking: Reported on 2024) 90 tablet  3    furosemide 20 MG Oral Tab Take  1 tab po daily (Patient not taking: Reported on 12/30/2024) 90 tablet 0      Patient Active Problem List   Diagnosis    Sarcoid    Primary hypertension    Class 3 severe obesity due to excess calories with body mass index (BMI) of 50.0 to 59.9 in adult (HCC)    Hepatic steatosis    Gastroesophageal reflux disease    Pulmonary nodules    Elevated liver enzymes    Mixed hyperlipidemia    Controlled type 2 diabetes mellitus without complication, without long-term current use of insulin (HCC)    Abnormal EKG    BRBPR (bright red blood per rectum)    Stress and adjustment reaction    Atopic dermatitis          Chata Leigh, EDA  [facility], [address], [place] [address]  [place] [address] [phone]        Document generated by:  Ghassan WOODWARD LPN

## (undated) NOTE — LETTER
Judi CaceresDO seraB:1965    CONSENT FOR PROCEDURE/SEDATION    1. I authorize the performance upon Judi White  the following: Endometrial biopsy procedure    2. I authorize Dr. Caryl Howard DO (and whomever is designated as the doctor’s assistant), to perform the above-mentioned procedures.    3. If any unforeseen conditions arise during this procedure calling for additional  procedures, operations, or medications (including anesthesia and blood transfusion), I further request and authorize the doctor to do whatever he/she deems advisable in my interest.    4. I consent to the taking and reproduction of any photographs in the course of this procedure for professional purposes.    5. I consent to the administration of such sedation as may be considered necessary or advisable by the physician responsible for this service, with the exception of ______________________________________________________    6. I have been informed by my doctor of the nature and purpose of this procedure sedation, possible alternative methods of treatment, risk involved and possible complications.    7. If I have a Do Not Resuscitate (DNR) order in place, the physician and I (or the individual authorized to consent on my behalf) will discuss and agree as to whether the Do Not Resuscitate (DNR) order will remain in effect or will be discontinued during the performance of the procedure and the applicable recovery period. If the Do Not Resuscitate (DNR) order is discontinued and is to be reinstated following the procedure/recovery period, the physician will determine when the applicable recovery period ends for purposes of reinstating the Do Not Resuscitate (DNR) order.    Signature of Patient:_______________________________________________    Signature of person authorized to consent for patient:  _______________________________________________________________    Relationship to patient:  ____________________________________________    Witness: _________________________________________ Date:___________     Physician Signature: _______________________________ Date:___________

## (undated) NOTE — LETTER
2/26/2024              Judi White        [address]        [place] [address]         Dear Judi,    Our records indicate that the tests ordered for you by Ghassan Mayer MD  have not been done.    US LIVER (CPT=[id number]) (Order #[id number]) on 11/14/23     If you have, in fact, already completed the tests or you do not wish to have the tests done, please contact our office at THE NUMBER LISTED BELOW.  Otherwise, please proceed with the testing.  Enclosed is a duplicate order for your convenience.        Sincerely,    Ghassan Mayer MD  [facility]  [address]  [place] [address] [phone]

## (undated) NOTE — LETTER
[facility]  [address], [place], [place]    Authorization for Surgical Operation and Procedure                               I hereby authorize Ghassan Mayer MD, my physician and his/her assistants (if applicable), which may include medical students, residents, and/or fellows, to perform the following surgical operation/ procedure and administer such anesthesia as may be determined necessary by my physician: Operation/Procedure name (s) COLONOSCOPY on Judi White   2.   I recognize that during the surgical operation/procedure, unforeseen conditions may necessitate additional or different procedures than those listed above.  I, therefore, further authorize and request that the above-named surgeon, assistants, or designees perform such procedures as are, in their judgment, necessary and desirable.    3.   My surgeon/physician has discussed prior to my surgery the potential benefits, risks and side effects of this procedure; the likelihood of achieving goals; and potential problems that might occur during recuperation.  They also discussed reasonable alternatives to the procedure, including risks, benefits, and side effects related to the alternatives and risks related to not receiving this procedure.  I have had all my questions answered and I acknowledge that no guarantee has been made as to the result that may be obtained.    4.   Should the need arise during my operation/procedure, which includes change of level of care prior to discharge, I also consent to the administration of blood and/or blood products.  Further, I understand that despite careful testing and screening of blood or blood products by collecting agencies, I may still be subject to ill effects as a result of receiving a blood transfusion and/or blood products.  The following are some, but not all, of the potential risks that can occur: fever and allergic reactions, hemolytic reactions, transmission of diseases  such as Hepatitis, AIDS and Cytomegalovirus (CMV) and fluid overload.  In the event that I wish to have an autologous transfusion of my own blood, or a directed donor transfusion, I will discuss this with my physician.  Check only if Refusing Blood or Blood Products  I understand refusal of blood or blood products as deemed necessary by my physician may have serious consequences to my condition to include possible death. I hereby assume responsibility for my refusal and release the hospital, its personnel, and my physicians from any responsibility for the consequences of my refusal.    o  Refuse   5.   I authorize the use of any specimen, organs, tissues, body parts or foreign objects that may be removed from my body during the operation/procedure for diagnosis, research or teaching purposes and their subsequent disposal by hospital authorities.  I also authorize the release of specimen test results and/or written reports to my treating physician on the hospital medical staff or other referring or consulting physicians involved in my care, at the discretion of the Pathologist or my treating physician.    6.   I consent to the photographing or videotaping of the operations or procedures to be performed, including appropriate portions of my body for medical, scientific, or educational purposes, provided my identity is not revealed by the pictures or by descriptive texts accompanying them.  If the procedure has been photographed/videotaped, the surgeon will obtain the original picture, image, videotape or CD.  The hospital will not be responsible for storage, release or maintenance of the picture, image, tape or CD.    7.   I consent to the presence of a  or observers in the operating room as deemed necessary by my physician or their designees.    8.   I recognize that in the event my procedure results in extended X-Ray/fluoroscopy time, I may develop a skin reaction.    9. If I have a Do Not Attempt  Resuscitation (DNAR) order in place, that status will be suspended while in the operating room, procedural suite, and during the recovery period unless otherwise explicitly stated by me (or a person authorized to consent on my behalf). The surgeon or my attending physician will determine when the applicable recovery period ends for purposes of reinstating the DNAR order.  10. Patients having a sterilization procedure: I understand that if the procedure is successful the results will be permanent and it will therefore be impossible for me to inseminate, conceive, or bear children.  I also understand that the procedure is intended to result in sterility, although the result has not been guaranteed.   11. I acknowledge that my physician has explained sedation/analgesia administration to me including the risk and benefits I consent to the administration of sedation/analgesia as may be necessary or desirable in the judgment of my physician.    I CERTIFY THAT I HAVE READ AND FULLY UNDERSTAND THE ABOVE CONSENT TO OPERATION and/or OTHER PROCEDURE.     ____________________________________  _________________________________        ______________________________  Signature of Patient    Signature of Responsible Person                Printed Name of Responsible Person                                      ____________________________________  _____________________________                ________________________________  Signature of Witness        Date  Time         Relationship to Patient    STATEMENT OF PHYSICIAN My signature below affirms that prior to the time of the procedure; I have explained to the patient and/or his/her legal representative, the risks and benefits involved in the proposed treatment and any reasonable alternative to the proposed treatment. I have also explained the risks and benefits involved in refusal of the proposed treatment and alternatives to the proposed treatment and have answered the patient's  questions. If I have a significant financial interest in a co-management agreement or a significant financial interest in any product or implant, or other significant relationship used in this procedure/surgery, I have disclosed this and had a discussion with my patient.     _____________________________________________________              _____________________________  (Signature of Physician)                                                                                         (Date)                                   (Time)  Patient Name: Judi White      : 1965      Printed: 2024     Medical Record #: [id number]                                      Page 1 of 1

## (undated) NOTE — LETTER
[facility]  [address], [place], [place]    Authorization for Surgical Operation and Procedure                               I hereby authorize Ghassan Mayer MD, my physician and his/her assistants (if applicable), which may include medical students, residents, and/or fellows, to perform the following surgical operation/ procedure and administer such anesthesia as may be determined necessary by my physician: Operation/Procedure name (s) COLONOSCOPY and ESOPHAGOGASTRODUODENOSCOPY (EGD)on Judi White   2.   I recognize that during the surgical operation/procedure, unforeseen conditions may necessitate additional or different procedures than those listed above.  I, therefore, further authorize and request that the above-named surgeon, assistants, or designees perform such procedures as are, in their judgment, necessary and desirable.    3.   My surgeon/physician has discussed prior to my surgery the potential benefits, risks and side effects of this procedure; the likelihood of achieving goals; and potential problems that might occur during recuperation.  They also discussed reasonable alternatives to the procedure, including risks, benefits, and side effects related to the alternatives and risks related to not receiving this procedure.  I have had all my questions answered and I acknowledge that no guarantee has been made as to the result that may be obtained.    4.   Should the need arise during my operation/procedure, which includes change of level of care prior to discharge, I also consent to the administration of blood and/or blood products.  Further, I understand that despite careful testing and screening of blood or blood products by collecting agencies, I may still be subject to ill effects as a result of receiving a blood transfusion and/or blood products.  The following are some, but not all, of the potential risks that can occur: fever and allergic reactions, hemolytic  reactions, transmission of diseases such as Hepatitis, AIDS and Cytomegalovirus (CMV) and fluid overload.  In the event that I wish to have an autologous transfusion of my own blood, or a directed donor transfusion, I will discuss this with my physician.  Check only if Refusing Blood or Blood Products  I understand refusal of blood or blood products as deemed necessary by my physician may have serious consequences to my condition to include possible death. I hereby assume responsibility for my refusal and release the hospital, its personnel, and my physicians from any responsibility for the consequences of my refusal.    o  Refuse   5.   I authorize the use of any specimen, organs, tissues, body parts or foreign objects that may be removed from my body during the operation/procedure for diagnosis, research or teaching purposes and their subsequent disposal by hospital authorities.  I also authorize the release of specimen test results and/or written reports to my treating physician on the hospital medical staff or other referring or consulting physicians involved in my care, at the discretion of the Pathologist or my treating physician.    6.   I consent to the photographing or videotaping of the operations or procedures to be performed, including appropriate portions of my body for medical, scientific, or educational purposes, provided my identity is not revealed by the pictures or by descriptive texts accompanying them.  If the procedure has been photographed/videotaped, the surgeon will obtain the original picture, image, videotape or CD.  The hospital will not be responsible for storage, release or maintenance of the picture, image, tape or CD.    7.   I consent to the presence of a  or observers in the operating room as deemed necessary by my physician or their designees.    8.   I recognize that in the event my procedure results in extended X-Ray/fluoroscopy time, I may develop a skin  reaction.    9. If I have a Do Not Attempt Resuscitation (DNAR) order in place, that status will be suspended while in the operating room, procedural suite, and during the recovery period unless otherwise explicitly stated by me (or a person authorized to consent on my behalf). The surgeon or my attending physician will determine when the applicable recovery period ends for purposes of reinstating the DNAR order.  10. Patients having a sterilization procedure: I understand that if the procedure is successful the results will be permanent and it will therefore be impossible for me to inseminate, conceive, or bear children.  I also understand that the procedure is intended to result in sterility, although the result has not been guaranteed.   11. I acknowledge that my physician has explained sedation/analgesia administration to me including the risk and benefits I consent to the administration of sedation/analgesia as may be necessary or desirable in the judgment of my physician.    I CERTIFY THAT I HAVE READ AND FULLY UNDERSTAND THE ABOVE CONSENT TO OPERATION and/or OTHER PROCEDURE.     ____________________________________  _________________________________        ______________________________  Signature of Patient    Signature of Responsible Person                Printed Name of Responsible Person                                      ____________________________________  _____________________________                ________________________________  Signature of Witness        Date  Time         Relationship to Patient    STATEMENT OF PHYSICIAN My signature below affirms that prior to the time of the procedure; I have explained to the patient and/or his/her legal representative, the risks and benefits involved in the proposed treatment and any reasonable alternative to the proposed treatment. I have also explained the risks and benefits involved in refusal of the proposed treatment and alternatives to the proposed  treatment and have answered the patient's questions. If I have a significant financial interest in a co-management agreement or a significant financial interest in any product or implant, or other significant relationship used in this procedure/surgery, I have disclosed this and had a discussion with my patient.     _____________________________________________________              _____________________________  (Signature of Physician)                                                                                         (Date)                                   (Time)  Patient Name: Judi White      : 1965      Printed: 2024     Medical Record #: [id number]                                      Page 1 of 1

## (undated) NOTE — LETTER
Judi White        [address]        [place] [address]                   Medical Grade Compression Hose        Patient: Judi White      YOB: 1965           Diagnosis: Varicose Veins with Pain- Bilateral: I83.813       Compression: 20-30mmHg- Moderate  Style: Waist        Amount: X 2  HCPS: - Waist          Physician Signature: _____________________  Date:  07/17/24            Sincerely,    EDA Ventura

## (undated) NOTE — LETTER
[address], [place], [place]    Authorization for Surgical Operation and Procedure                               I hereby authorize Chantel De La Rosa DO, my physician and his/her assistants (if applicable), which may include medical students, residents, and/or fellows, to perform the following surgical operation/ procedure and administer such anesthesia as may be determined necessary by my physician: Operation/Procedure name (s) Left Lumbar 5 and Sacral 1 Transforaminal Epidural Steroid Injection on Judi White   2.   I recognize that during the surgical operation/procedure, unforeseen conditions may necessitate additional or different procedures than those listed above.  I, therefore, further authorize and request that the above-named surgeon, assistants, or designees perform such procedures as are, in their judgment, necessary and desirable.    3.   My surgeon/physician has discussed prior to my surgery the potential benefits, risks and side effects of this procedure; the likelihood of achieving goals; and potential problems that might occur during recuperation.  They also discussed reasonable alternatives to the procedure, including risks, benefits, and side effects related to the alternatives and risks related to not receiving this procedure.  I have had all my questions answered and I acknowledge that no guarantee has been made as to the result that may be obtained.    4.   Should the need arise during my operation/procedure, which includes change of level of care prior to discharge, I also consent to the administration of blood and/or blood products.  Further, I understand that despite careful testing and screening of blood or blood products by collecting agencies, I may still be subject to ill effects as a result of receiving a blood transfusion and/or blood products.  The following are some, but not all, of the potential risks that can occur: fever and allergic  reactions, hemolytic reactions, transmission of diseases such as Hepatitis, AIDS and Cytomegalovirus (CMV) and fluid overload.  In the event that I wish to have an autologous transfusion of my own blood, or a directed donor transfusion, I will discuss this with my physician.  Check only if Refusing Blood or Blood Products  I understand refusal of blood or blood products as deemed necessary by my physician may have serious consequences to my condition to include possible death. I hereby assume responsibility for my refusal and release the hospital, its personnel, and my physicians from any responsibility for the consequences of my refusal.    o  Refuse   5.   I authorize the use of any specimen, organs, tissues, body parts or foreign objects that may be removed from my body during the operation/procedure for diagnosis, research or teaching purposes and their subsequent disposal by hospital authorities.  I also authorize the release of specimen test results and/or written reports to my treating physician on the hospital medical staff or other referring or consulting physicians involved in my care, at the discretion of the Pathologist or my treating physician.    6.   I consent to the photographing or videotaping of the operations or procedures to be performed, including appropriate portions of my body for medical, scientific, or educational purposes, provided my identity is not revealed by the pictures or by descriptive texts accompanying them.  If the procedure has been photographed/videotaped, the surgeon will obtain the original picture, image, videotape or CD.  The hospital will not be responsible for storage, release or maintenance of the picture, image, tape or CD.    7.   I consent to the presence of a  or observers in the operating room as deemed necessary by my physician or their designees.    8.   I recognize that in the event my procedure results in extended X-Ray/fluoroscopy time, I may  develop a skin reaction.    9. If I have a Do Not Attempt Resuscitation (DNAR) order in place, that status will be suspended while in the operating room, procedural suite, and during the recovery period unless otherwise explicitly stated by me (or a person authorized to consent on my behalf). The surgeon or my attending physician will determine when the applicable recovery period ends for purposes of reinstating the DNAR order.  10. Patients having a sterilization procedure: I understand that if the procedure is successful the results will be permanent and it will therefore be impossible for me to inseminate, conceive, or bear children.  I also understand that the procedure is intended to result in sterility, although the result has not been guaranteed.   11. I acknowledge that my physician has explained sedation/analgesia administration to me including the risk and benefits I consent to the administration of sedation/analgesia as may be necessary or desirable in the judgment of my physician.    I CERTIFY THAT I HAVE READ AND FULLY UNDERSTAND THE ABOVE CONSENT TO OPERATION and/or OTHER PROCEDURE.     ____________________________________  _________________________________        ______________________________  Signature of Patient    Signature of Responsible Person                Printed Name of Responsible Person                                      ____________________________________  _____________________________                ________________________________  Signature of Witness        Date  Time         Relationship to Patient    STATEMENT OF PHYSICIAN My signature below affirms that prior to the time of the procedure; I have explained to the patient and/or his/her legal representative, the risks and benefits involved in the proposed treatment and any reasonable alternative to the proposed treatment. I have also explained the risks and benefits involved in refusal of the proposed treatment and alternatives to  the proposed treatment and have answered the patient's questions. If I have a significant financial interest in a co-management agreement or a significant financial interest in any product or implant, or other significant relationship used in this procedure/surgery, I have disclosed this and had a discussion with my patient.     _____________________________________________________              _____________________________  (Signature of Physician)                                                                                         (Date)                                   (Time)  Patient Name: Judi White      : 1965      Printed: 3/19/2025     Medical Record #: [id number]                                      Page 1 of 1

## (undated) NOTE — LETTER
[facility]  Administration of Anesthesia  Judi CONTRERAS agree to be cared for by a physician anesthesiologist alone and/or with a nurse anesthetist, who is specially trained to monitor me and give me medicine to put me to sleep or keep me comfortable during my procedure    I understand that my anesthesiologist and/or anesthetist is not an employee or agent of [facility] or [employer]. He or she works for [facility]    As the patient asking for anesthesia services, I agree to:  Allow the anesthesiologist (anesthesia doctor) to give me medicine and do additional procedures as necessary. Some examples are: Starting or using an “IV” to give me medicine, fluids or blood during my procedure, and having a breathing tube placed to help me breathe when I’m asleep (intubation). In the event that my heart stops working properly, I understand that my anesthesiologist will make every effort to sustain my life, unless otherwise directed by [facility] Do Not Resuscitate documents.  Tell my anesthesia doctor before my procedure:  If I am pregnant.  The last time that I ate or drank.  iii. All of the medicines I take (including prescriptions, herbal supplements, and pills I can buy without a prescription (including street drugs/illegal medications). Failure to inform my anesthesiologist about these medicines may increase my risk of anesthetic complications.  iv.If I am allergic to anything or have had a reaction to anesthesia before.  I understand how the anesthesia medicine will help me (benefits).  I understand that with any type of anesthesia medicine there are risks:  The most common risks are: nausea, vomiting, sore throat, muscle soreness, damage to my eyes, mouth, or teeth (from breathing tube placement).  Rare risks include: remembering what happened during my procedure, allergic reactions to medications, injury to my airway, heart, lungs, vision, nerves, or  muscles and in extremely rare instances death.  My doctor has explained to me other choices available to me for my care (alternatives).  Pregnant Patients (“epidural”):  I understand that the risks of having an epidural (medicine given into my back to help control pain during labor), include itching, low blood pressure, difficulty urinating, headache or slowing of the baby’s heart. Very rare risks include infection, bleeding, seizure, irregular heart rhythms and nerve injury.  Regional Anesthesia (“spinal”, “epidural”, & “nerve blocks”):  I understand that rare but potential complications include headache, bleeding, infection, seizure, irregular heart rhythms, and nerve injury.    _____________________________________________________________________________  Patient (or Representative) Signature/Relationship to Patient  Date   Time    _____________________________________________________________________________   Name (if used)    Language/Organization   Time    _____________________________________________________________________________  Nurse Anesthetist Signature     Date   Time  _____________________________________________________________________________  Anesthesiologist Signature     Date   Time  I have discussed the procedure and information above with the patient (or patient’s representative) and answered their questions. The patient or their representative has agreed to have anesthesia services.    _____________________________________________________________________________  Witness        Date   Time  I have verified that the signature is that of the patient or patient’s representative, and that it was signed before the procedure  Patient Name: Judi White     : 1965                 Printed: 2024 at 7:43 AM    Medical Record #: [id number]                                            Page 1 of 1  ----------ANESTHESIA CONSENT----------

## (undated) NOTE — Clinical Note
Thank you for the kind referral. Please feel free to reach out with any questions.   Eyad Gonzalez MD

## (undated) NOTE — LETTER
AUTHORIZATION FOR SURGICAL OPERATION OR OTHER PROCEDURE    1. I hereby authorize Dr. Khang Flores, and [facility] staff assigned to my case to perform the following operation and/or procedure at the [facility]:    _______________________________________________________________________________________________    Cortisone Injection Left Heel  _______________________________________________________________________________________________    2. My physician has explained the nature and purpose of the operation or other procedure, possible alternative methods of treatment, the risks involved, and the possibility of complication to me. I acknowledge that no guarantee has been made as to the result that may be obtained. 3.  I recognize that, during the course of this operation, or other procedure, unforseen conditions may necessitate additional or different procedure than those listed above. I, therefore, further authorize and request that the above named physician, his/her physician assistants or designees perform such procedures as are, in his/her professional opinion, necessary and desirable. 4.  Any tissue or organs removed in the operation or other procedure may be disposed of by and at the discretion of the [facility] and [facility] AT [facility]. 5.  I understand that in the event of a medical emergency, I will be transported by local paramedics to [facility] or other hospital emergency department. 6.  I certify that I have read and fully understand the above consent to operation and/or other procedure. 7.  I acknowledge that my physician has explained sedation/analgesia administration to me including the risks and benefits. I consent to the administration of sedation/analgesia as may be necessary or desirable in the judgement of my physician.     Witness signature: ___________________________________________________ Date:  ______/______/_____ Time:  ________ A. M.  P.M. Patient Name:  ______________________________________________________  (please print)      Patient signature:  ___________________________________________________             Relationship to Patient:           []  Parent    Responsible person                          []  Spouse  In case of minor or                    [] Other  _____________   Incompetent name:  __________________________________________________                               (please print)      _____________      Responsible person  In case of minor or  Incompetent signature:  _______________________________________________    Statement of Physician  My signature below affirms that prior to the time of the procedure, I have explained to the patient and/or his/her guardian, the risks and benefits involved in the proposed treatment and any reasonable alternative to the proposed treatment. I have also explained the risks and benefits involved in the refusal of the proposed treatment and have answered the patient's questions.                         Date:  ______/______/_______  Provider                      Signature:  __________________________________________________________       Time:  ___________ A.M    P.M.

## (undated) NOTE — LETTER
7/15/2024      [facility] [place]  [address] [address]  [phone]             REFERRAL ORDER         Patient Name:   Judi White ([id number])   Sex: female  : 1965       Order Date:  7/3/2024  Authorizing Provider:   ARLENE BOTELLO     Procedure:  PHYSICAL THERAPY EXTERNAL [011206]         Order #:   [id number]  Qty:  1     Priority:  Routine                   Class:   Specialty External     Standing Interval:          Standing Occurrences:          Expires on:            Expected by:    Associated DX:  Bilateral lumbar radiculopathy (M54.16)  Morbid obesity with BMI of 50.0-59.9, adult (HCC) (E66.01,Z68.43)  HNP (herniated nucleus pulposus), lumbar (M51.26)  Lumbar facet arthropathy (M47.816)     Order summary:  PHYSICAL THERAPY REFERRAL INFORMATION:  EVALUATE AND TREAT 2-3 times a week for 4-6 weeks  Recommendations:  Extension based exercises including Valdez protocol Strengthen core and gluts with improved core stabilization Stretching of the hamstrings, il  iopsoas, iliotibial band Mobilization of the thoracolumbar fascia Myofascial release and soft tissue release of the lumbar paraspinals, quadratus lumborum Modalities as needed including graston, ultrasound, ice/heat Please provide a home exercise program  Ordering Physician: Arlene Botello DO (Signed Electronically in [facility]), Specialty External, Routine, 18 visits            Comments:  PHYSICAL THERAPY REFERRAL INFORMATION:     EVALUATE AND TREAT  2-3 times a week for 4-6 weeks     Recommendations:   Extension based exercises including Valdez protocol  Strengthen core and gluts with improved core stabilization  Stretching of the hamstrings, iliopsoas, iliotibial band  Mobilization of the thoracolumbar fascia  Myofascial release and soft tissue release of the lumbar paraspinals, quadratus lumborum  Modalities as needed including graston, ultrasound, ice/heat  Please provide a  home exercise program     Ordering Physician: Chantel De La Rosa DO  (Signed Electronically in [facility])           Scheduling Instructions:  **REFERRAL REQUEST**     Your physician has referred you to a specialist or specialty device.     If you are confident that your benefit plan will not require a referral or authorization, such as [place] Medicaid, please feel free to schedule your appointment immediately. However, if you are unsure about the requirements for authorization, please wait 5-7 days and then contact your physician's office. At that time, you will be provided with any authorization numbers or be assured that none are required. You can then schedule your appointment. Failure to obtain required authorization numbers can create reimbursement difficulties for you.  Electronically Signed By: Chantel De La Rosa DO [NPI: [id number]]  Order Date: Jul 3, 2024 at 12:13 PM

## (undated) NOTE — LETTER
AUTHORIZATION FOR SURGICAL OPERATION OR OTHER PROCEDURE    1. I hereby authorize Dr. Jorge A Kenney, and [facility] staff assigned to my case to perform the following operation and/or procedure at the [facility] site: Endometrial Biopsy   _______________________________________________________________________________________________    2.  My physician has explained the nature and purpose of the operation or other procedure, possible alternative methods of treatment, the risks involved, and the possibility of complication to me.  I acknowledge that no guarantee has been made as to the result that may be obtained.  3.  I recognize that, during the course of this operation, or other procedure, unforseen conditions may necessitate additional or different procedure than those listed above.  I, therefore, further authorize and request that the above named physician, his/her physician assistants or designees perform such procedures as are, in his/her professional opinion, necessary and desirable.  4.  Any tissue or organs removed in the operation or other procedure may be disposed of by and at the discretion of the [facility] and [facility].  5.  I understand that in the event of a medical emergency, I will be transported by local paramedics to [facility] or other hospital emergency department.  6.  I certify that I have read and fully understand the above consent to operation and/or other procedure.    7.  I acknowledge that my physician has explained sedation/analgesia administration to me including the risks and benefits.  I consent to the administration of sedation/analgesia as may be necessary or desirable in the judgement of my physician.    Witness signature: ___________________________________________________ Date:  _12__/____/_____                    Time:  ________ A.M.  P.M.       Patient Name:   ______________________________________________________  (please print)      Patient signature:  ___________________________________________________             Relationship to Patient:           []  Parent    Responsible person                          []  Spouse  In case of minor or                    [] Other  _____________   Incompetent name:  __________________________________________________                               (please print)      _____________      Responsible person  In case of minor or  Incompetent signature:  _______________________________________________    Statement of Physician  My signature below affirms that prior to the time of the procedure, I have explained to the patient and/or his/her guardian, the risks and benefits involved in the proposed treatment and any reasonable alternative to the proposed treatment.  I have also explained the risks and benefits involved in the refusal of the proposed treatment and have answered the patient's questions.                        Date:  _12__/__05_/__24___  Provider                      Signature:  __________________________________________________________       Time:  ___________ A.M    P.M.

## (undated) NOTE — LETTER
[address] [facility] [address] [place], [place]  Authorization for Surgical Operation and Procedure                                                                                           I hereby authorize Mandeep Hoskins MD, my physician and his/her assistants (if applicable), which may include medical students, residents, and/or fellows, to perform the following surgical operation/ procedure and administer such anesthesia as may be determined necessary by my physician: Operation/Procedure name (s) ESOPHAGOGASTRODUODENOSCOPY on [address]   2. I recognize that during the surgical operation/procedure, unforeseen conditions may necessitate additional or different procedures than those listed above. I, therefore, further authorize and request that the above-named surgeon, assistants, or designees perform such procedures as are, in their judgment, necessary and desirable. 3.   My surgeon/physician has discussed prior to my surgery the potential benefits, risks and side effects of this procedure; the likelihood of achieving goals; and potential problems that might occur during recuperation. They also discussed reasonable alternatives to the procedure, including risks, benefits, and side effects related to the alternatives and risks related to not receiving this procedure. I have had all my questions answered and I acknowledge that no guarantee has been made as to the result that may be obtained. 4.   Should the need arise during my operation/procedure, which includes change of level of care prior to discharge, I also consent to the administration of blood and/or blood products. Further, I understand that despite careful testing and screening of blood or blood products by collecting agencies, I may still be subject to ill effects as a result of receiving a blood transfusion and/or blood products.   The following are some, but not all, of the potential risks that can occur: fever and allergic reactions, hemolytic reactions, transmission of diseases such as Hepatitis, AIDS and Cytomegalovirus (CMV) and fluid overload. In the event that I wish to have an autologous transfusion of my own blood, or a directed donor transfusion, I will discuss this with my physician. Check only if Refusing Blood or Blood Products  I understand refusal of blood or blood products as deemed necessary by my physician may have serious consequences to my condition to include possible death. I hereby assume responsibility for my refusal and release the hospital, its personnel, and my physicians from any responsibility for the consequences of my refusal.    o  Refuse   5. I authorize the use of any specimen, organs, tissues, body parts or foreign objects that may be removed from my body during the operation/procedure for diagnosis, research or teaching purposes and their subsequent disposal by hospital authorities. I also authorize the release of specimen test results and/or written reports to my treating physician on the hospital medical staff or other referring or consulting physicians involved in my care, at the discretion of the Pathologist or my treating physician. 6.   I consent to the photographing or videotaping of the operations or procedures to be performed, including appropriate portions of my body for medical, scientific, or educational purposes, provided my identity is not revealed by the pictures or by descriptive texts accompanying them. If the procedure has been photographed/videotaped, the surgeon will obtain the original picture, image, videotape or CD. The hospital will not be responsible for storage, release or maintenance of the picture, image, tape or CD.    7.   I consent to the presence of a  or observers in the operating room as deemed necessary by my physician or their designees.     8.   I recognize that in the event my procedure results in extended X-Ray/fluoroscopy time, I may develop a skin reaction. 9. If I have a Do Not Attempt Resuscitation (DNAR) order in place, that status will be suspended while in the operating room, procedural suite, and during the recovery period unless otherwise explicitly stated by me (or a person authorized to consent on my behalf). The surgeon or my attending physician will determine when the applicable recovery period ends for purposes of reinstating the DNAR order. 10. Patients having a sterilization procedure: I understand that if the procedure is successful the results will be permanent and it will therefore be impossible for me to inseminate, conceive, or bear children. I also understand that the procedure is intended to result in sterility, although the result has not been guaranteed. 11. I acknowledge that my physician has explained sedation/analgesia administration to me including the risk and benefits I consent to the administration of sedation/analgesia as may be necessary or desirable in the judgment of my physician. I CERTIFY THAT I HAVE READ AND FULLY UNDERSTAND THE ABOVE CONSENT TO OPERATION and/or OTHER PROCEDURE.     _________________________________________ _________________________________     ___________________________________  Signature of Patient     Signature of Responsible Person                   Printed Name of Responsible Person                              _________________________________________ ______________________________        ___________________________________  Signature of Witness         Date  Time         Relationship to Patient    STATEMENT OF PHYSICIAN My signature below affirms that prior to the time of the procedure; I have explained to the patient and/or his/her legal representative, the risks and benefits involved in the proposed treatment and any reasonable alternative to the proposed treatment.  I have also explained the risks and benefits involved in refusal of the proposed treatment and alternatives to the proposed treatment and have answered the patient's questions.  If I have a significant financial interest in a co-management agreement or a significant financial interest in any product or implant, or other significant relationship used in this procedure/surgery, I have disclosed this and had a discussion with my patient.     _______________________________________________________________ _____________________________  June Coke of Physician)                                                                                         (Date)                                   (Time)  Patient Name: Jevon Angelo    : 1965   Printed: 11/15/2023      Medical Record #: [id number]                                              Page 1 of 1